# Patient Record
Sex: FEMALE | Race: WHITE | NOT HISPANIC OR LATINO | Employment: FULL TIME | URBAN - METROPOLITAN AREA
[De-identification: names, ages, dates, MRNs, and addresses within clinical notes are randomized per-mention and may not be internally consistent; named-entity substitution may affect disease eponyms.]

---

## 2020-05-17 ENCOUNTER — HOSPITAL ENCOUNTER (EMERGENCY)
Facility: HOSPITAL | Age: 30
Discharge: HOME | End: 2020-05-17
Attending: EMERGENCY MEDICINE
Payer: COMMERCIAL

## 2020-05-17 VITALS
RESPIRATION RATE: 20 BRPM | HEIGHT: 68 IN | SYSTOLIC BLOOD PRESSURE: 129 MMHG | HEART RATE: 84 BPM | OXYGEN SATURATION: 99 % | BODY MASS INDEX: 43.19 KG/M2 | DIASTOLIC BLOOD PRESSURE: 82 MMHG | TEMPERATURE: 99 F | WEIGHT: 285 LBS
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
## 2020-05-17 DIAGNOSIS — S93.409A SPRAIN OF ANKLE, UNSPECIFIED LATERALITY, UNSPECIFIED LIGAMENT, INITIAL ENCOUNTER: Primary | ICD-10-CM

## 2020-05-17 PROCEDURE — 99283 EMERGENCY DEPT VISIT LOW MDM: CPT | Mod: 25,ER

## 2020-05-17 RX ORDER — IBUPROFEN 600 MG/1
600 TABLET ORAL EVERY 6 HOURS PRN
Qty: 20 TABLET | Refills: 0 | Status: SHIPPED | OUTPATIENT
Start: 2020-05-17 | End: 2021-10-05 | Stop reason: ALTCHOICE

## 2020-12-31 ENCOUNTER — HOSPITAL ENCOUNTER (OUTPATIENT)
Dept: RADIOLOGY | Facility: HOSPITAL | Age: 30
Discharge: HOME | End: 2020-12-31
Attending: OBSTETRICS & GYNECOLOGY
Payer: COMMERCIAL

## 2020-12-31 DIAGNOSIS — Z31.41 FERTILITY TESTING: ICD-10-CM

## 2020-12-31 PROCEDURE — 76856 US EXAM PELVIC COMPLETE: CPT | Mod: 26,,, | Performed by: RADIOLOGY

## 2020-12-31 PROCEDURE — 76830 TRANSVAGINAL US NON-OB: CPT | Mod: 26,,, | Performed by: RADIOLOGY

## 2020-12-31 PROCEDURE — 76856 US EXAM PELVIC COMPLETE: CPT | Mod: TC

## 2020-12-31 PROCEDURE — 76856 US PELVIS COMP WITH TRANSVAG NON-OB (XPD): ICD-10-PCS | Mod: 26,,, | Performed by: RADIOLOGY

## 2020-12-31 PROCEDURE — 76830 US PELVIS COMP WITH TRANSVAG NON-OB (XPD): ICD-10-PCS | Mod: 26,,, | Performed by: RADIOLOGY

## 2021-01-07 ENCOUNTER — LAB VISIT (OUTPATIENT)
Dept: LAB | Facility: HOSPITAL | Age: 31
End: 2021-01-07
Attending: OBSTETRICS & GYNECOLOGY
Payer: COMMERCIAL

## 2021-01-07 DIAGNOSIS — Z31.41 FERTILITY TESTING: Primary | ICD-10-CM

## 2021-01-07 LAB
ABO + RH BLD: NORMAL
BASOPHILS # BLD AUTO: 0.05 K/UL (ref 0–0.2)
BASOPHILS NFR BLD: 0.4 % (ref 0–1.9)
BLD GP AB SCN CELLS X3 SERPL QL: NORMAL
DIFFERENTIAL METHOD: ABNORMAL
EOSINOPHIL # BLD AUTO: 0.2 K/UL (ref 0–0.5)
EOSINOPHIL NFR BLD: 1.2 % (ref 0–8)
ERYTHROCYTE [DISTWIDTH] IN BLOOD BY AUTOMATED COUNT: 14.6 % (ref 11.5–14.5)
HBV SURFACE AG SERPL QL IA: NEGATIVE
HCT VFR BLD AUTO: 39.4 % (ref 37–48.5)
HGB BLD-MCNC: 12.5 G/DL (ref 12–16)
HIV-1 AND HIV-2 ANTIBODIES: NEGATIVE
IMM GRANULOCYTES # BLD AUTO: 0.04 K/UL (ref 0–0.04)
IMM GRANULOCYTES NFR BLD AUTO: 0.3 % (ref 0–0.5)
LYMPHOCYTES # BLD AUTO: 4.1 K/UL (ref 1–4.8)
LYMPHOCYTES NFR BLD: 28.8 % (ref 18–48)
MCH RBC QN AUTO: 29.1 PG (ref 27–31)
MCHC RBC AUTO-ENTMCNC: 31.7 G/DL (ref 32–36)
MCV RBC AUTO: 92 FL (ref 82–98)
MONOCYTES # BLD AUTO: 0.8 K/UL (ref 0.3–1)
MONOCYTES NFR BLD: 5.7 % (ref 4–15)
NEUTROPHILS # BLD AUTO: 9 K/UL (ref 1.8–7.7)
NEUTROPHILS NFR BLD: 63.6 % (ref 38–73)
NRBC BLD-RTO: 0 /100 WBC
PLATELET # BLD AUTO: 287 K/UL (ref 150–350)
PMV BLD AUTO: 10.6 FL (ref 9.2–12.9)
RBC # BLD AUTO: 4.29 M/UL (ref 4–5.4)
RUBELLA IMMUNE STATUS: NORMAL
WBC # BLD AUTO: 14.13 K/UL (ref 3.9–12.7)

## 2021-01-07 PROCEDURE — 87340 HEPATITIS B SURFACE AG IA: CPT

## 2021-01-07 PROCEDURE — 36415 COLL VENOUS BLD VENIPUNCTURE: CPT

## 2021-01-07 PROCEDURE — 86850 RBC ANTIBODY SCREEN: CPT

## 2021-01-07 PROCEDURE — 83520 IMMUNOASSAY QUANT NOS NONAB: CPT

## 2021-01-07 PROCEDURE — 86787 VARICELLA-ZOSTER ANTIBODY: CPT

## 2021-01-07 PROCEDURE — 86705 HEP B CORE ANTIBODY IGM: CPT

## 2021-01-07 PROCEDURE — 87491 CHLMYD TRACH DNA AMP PROBE: CPT

## 2021-01-07 PROCEDURE — 86900 BLOOD TYPING SEROLOGIC ABO: CPT

## 2021-01-07 PROCEDURE — 84146 ASSAY OF PROLACTIN: CPT

## 2021-01-07 PROCEDURE — 86703 HIV-1/HIV-2 1 RESULT ANTBDY: CPT

## 2021-01-07 PROCEDURE — 82728 ASSAY OF FERRITIN: CPT

## 2021-01-07 PROCEDURE — 85025 COMPLETE CBC W/AUTO DIFF WBC: CPT

## 2021-01-07 PROCEDURE — 87591 N.GONORRHOEAE DNA AMP PROB: CPT

## 2021-01-07 PROCEDURE — 82306 VITAMIN D 25 HYDROXY: CPT

## 2021-01-07 PROCEDURE — 86762 RUBELLA ANTIBODY: CPT

## 2021-01-08 LAB
25(OH)D3+25(OH)D2 SERPL-MCNC: 34 NG/ML (ref 30–96)
C TRACH DNA SPEC QL NAA+PROBE: NOT DETECTED
HBV CORE IGM SERPL QL IA: NEGATIVE
HBV SURFACE AG SERPL QL IA: NEGATIVE
HIV 1+2 AB+HIV1 P24 AG SERPL QL IA: NEGATIVE
N GONORRHOEA DNA SPEC QL NAA+PROBE: NOT DETECTED
PROLACTIN SERPL IA-MCNC: 7.3 NG/ML (ref 5.2–26.5)
RUBV IGG SER-ACNC: 7.1 IU/ML
RUBV IGG SER-IMP: ABNORMAL

## 2021-01-09 LAB
VARICELLA INTERPRETATION: POSITIVE
VARICELLA ZOSTER IGG: 1.76 ISR (ref 0–0.9)

## 2021-01-11 LAB — MIS SERPL-MCNC: 1.4 NG/ML (ref 0.58–8.1)

## 2021-01-13 LAB
FERRITIN SERPL-MCNC: NORMAL NG/ML
HEMATOLOGIST REVIEW: NORMAL
HGB A MFR BLD ELPH: 97.7 % (ref 95.8–98)
HGB A2 MFR BLD: 2.3 % (ref 2–3.3)
HGB F MFR BLD: 0 % (ref 0–0.9)
HGB FRACT BLD ELPH-IMP: NORMAL
HGB OTHER MFR BLD ELPH: 0 %
THEVP VARIANT 2: NORMAL
THEVP VARIANT 3: NORMAL

## 2021-02-05 LAB
T4 FREE SERPL-MCNC: 0.81 NG/DL (ref 0.71–1.51)
TSH SERPL DL<=0.005 MIU/L-ACNC: 1.72 UIU/ML (ref 0.4–4)

## 2021-02-08 LAB
HCV AB SERPL QL IA: NEGATIVE
RPR SER QL: NORMAL

## 2021-06-09 ENCOUNTER — HOSPITAL ENCOUNTER (EMERGENCY)
Facility: HOSPITAL | Age: 31
Discharge: HOME | End: 2021-06-09
Attending: EMERGENCY MEDICINE
Payer: COMMERCIAL

## 2021-06-09 VITALS
HEART RATE: 67 BPM | OXYGEN SATURATION: 98 % | HEIGHT: 67 IN | RESPIRATION RATE: 20 BRPM | DIASTOLIC BLOOD PRESSURE: 73 MMHG | WEIGHT: 290 LBS | SYSTOLIC BLOOD PRESSURE: 157 MMHG | BODY MASS INDEX: 45.52 KG/M2 | TEMPERATURE: 98 F

## 2021-06-09 DIAGNOSIS — L05.91 PILONIDAL CYST: Primary | ICD-10-CM

## 2021-06-09 PROCEDURE — 99284 EMERGENCY DEPT VISIT MOD MDM: CPT | Mod: 25

## 2021-06-09 PROCEDURE — 25000003 PHARM REV CODE 250: Performed by: EMERGENCY MEDICINE

## 2021-06-09 PROCEDURE — 10081 I&D PILONIDAL CYST COMP: CPT

## 2021-06-09 RX ORDER — SULFAMETHOXAZOLE AND TRIMETHOPRIM 800; 160 MG/1; MG/1
2 TABLET ORAL 2 TIMES DAILY
Qty: 40 TABLET | Refills: 0 | Status: SHIPPED | OUTPATIENT
Start: 2021-06-09 | End: 2021-06-15

## 2021-06-09 RX ORDER — LIDOCAINE HYDROCHLORIDE AND EPINEPHRINE 10; 10 MG/ML; UG/ML
10 INJECTION, SOLUTION INFILTRATION; PERINEURAL
Status: COMPLETED | OUTPATIENT
Start: 2021-06-09 | End: 2021-06-09

## 2021-06-09 RX ORDER — HYDROCODONE BITARTRATE AND ACETAMINOPHEN 5; 325 MG/1; MG/1
1 TABLET ORAL EVERY 6 HOURS PRN
Qty: 12 TABLET | Refills: 0 | Status: SHIPPED | OUTPATIENT
Start: 2021-06-09 | End: 2021-10-05 | Stop reason: ALTCHOICE

## 2021-06-09 RX ADMIN — LIDOCAINE HYDROCHLORIDE,EPINEPHRINE BITARTRATE 10 ML: 10; .01 INJECTION, SOLUTION INFILTRATION; PERINEURAL at 08:06

## 2021-06-15 ENCOUNTER — OFFICE VISIT (OUTPATIENT)
Dept: SURGERY | Facility: CLINIC | Age: 31
End: 2021-06-15
Payer: COMMERCIAL

## 2021-06-15 VITALS
BODY MASS INDEX: 44.73 KG/M2 | TEMPERATURE: 97 F | SYSTOLIC BLOOD PRESSURE: 136 MMHG | WEIGHT: 285 LBS | DIASTOLIC BLOOD PRESSURE: 85 MMHG | HEART RATE: 88 BPM | HEIGHT: 67 IN

## 2021-06-15 DIAGNOSIS — L05.91 PILONIDAL CYST: ICD-10-CM

## 2021-06-15 PROCEDURE — 1126F PR PAIN SEVERITY QUANTIFIED, NO PAIN PRESENT: ICD-10-PCS | Mod: S$GLB,,, | Performed by: PHYSICIAN ASSISTANT

## 2021-06-15 PROCEDURE — 3008F BODY MASS INDEX DOCD: CPT | Mod: CPTII,S$GLB,, | Performed by: PHYSICIAN ASSISTANT

## 2021-06-15 PROCEDURE — 99203 OFFICE O/P NEW LOW 30 MIN: CPT | Mod: S$GLB,,, | Performed by: PHYSICIAN ASSISTANT

## 2021-06-15 PROCEDURE — 3008F PR BODY MASS INDEX (BMI) DOCUMENTED: ICD-10-PCS | Mod: CPTII,S$GLB,, | Performed by: PHYSICIAN ASSISTANT

## 2021-06-15 PROCEDURE — 1126F AMNT PAIN NOTED NONE PRSNT: CPT | Mod: S$GLB,,, | Performed by: PHYSICIAN ASSISTANT

## 2021-06-15 PROCEDURE — 99203 PR OFFICE/OUTPT VISIT, NEW, LEVL III, 30-44 MIN: ICD-10-PCS | Mod: S$GLB,,, | Performed by: PHYSICIAN ASSISTANT

## 2021-06-15 RX ORDER — ESCITALOPRAM OXALATE 10 MG/1
10 TABLET ORAL DAILY
COMMUNITY
End: 2021-10-05 | Stop reason: SDUPTHER

## 2021-09-27 ENCOUNTER — TELEPHONE (OUTPATIENT)
Dept: OBSTETRICS AND GYNECOLOGY | Facility: CLINIC | Age: 31
End: 2021-09-27

## 2021-09-27 DIAGNOSIS — Z34.90 PREGNANCY: Primary | ICD-10-CM

## 2021-10-05 ENCOUNTER — OFFICE VISIT (OUTPATIENT)
Dept: FAMILY MEDICINE | Facility: CLINIC | Age: 31
End: 2021-10-05
Payer: COMMERCIAL

## 2021-10-05 VITALS
BODY MASS INDEX: 45.99 KG/M2 | RESPIRATION RATE: 18 BRPM | TEMPERATURE: 99 F | WEIGHT: 293 LBS | DIASTOLIC BLOOD PRESSURE: 70 MMHG | HEART RATE: 88 BPM | SYSTOLIC BLOOD PRESSURE: 126 MMHG | OXYGEN SATURATION: 96 % | HEIGHT: 67 IN

## 2021-10-05 DIAGNOSIS — Z34.91 FIRST TRIMESTER PREGNANCY: ICD-10-CM

## 2021-10-05 DIAGNOSIS — Z00.00 ANNUAL PHYSICAL EXAM: Primary | ICD-10-CM

## 2021-10-05 DIAGNOSIS — J30.9 ALLERGIC RHINITIS, UNSPECIFIED SEASONALITY, UNSPECIFIED TRIGGER: ICD-10-CM

## 2021-10-05 DIAGNOSIS — E66.01 CLASS 3 SEVERE OBESITY DUE TO EXCESS CALORIES WITHOUT SERIOUS COMORBIDITY WITH BODY MASS INDEX (BMI) OF 45.0 TO 49.9 IN ADULT: ICD-10-CM

## 2021-10-05 DIAGNOSIS — F41.8 ANXIETY ASSOCIATED WITH DEPRESSION: ICD-10-CM

## 2021-10-05 PROBLEM — L30.9 FOOT DERMATITIS: Status: ACTIVE | Noted: 2019-06-04

## 2021-10-05 PROBLEM — J01.90 ACUTE NON-RECURRENT SINUSITIS: Status: ACTIVE | Noted: 2019-06-04

## 2021-10-05 PROCEDURE — 1159F MED LIST DOCD IN RCRD: CPT | Mod: CPTII,S$GLB,, | Performed by: NURSE PRACTITIONER

## 2021-10-05 PROCEDURE — 3078F DIAST BP <80 MM HG: CPT | Mod: CPTII,S$GLB,, | Performed by: NURSE PRACTITIONER

## 2021-10-05 PROCEDURE — 99385 PR PREVENTIVE VISIT,NEW,18-39: ICD-10-PCS | Mod: S$GLB,,, | Performed by: NURSE PRACTITIONER

## 2021-10-05 PROCEDURE — 99999 PR PBB SHADOW E&M-EST. PATIENT-LVL IV: CPT | Mod: PBBFAC,,, | Performed by: NURSE PRACTITIONER

## 2021-10-05 PROCEDURE — 3008F BODY MASS INDEX DOCD: CPT | Mod: CPTII,S$GLB,, | Performed by: NURSE PRACTITIONER

## 2021-10-05 PROCEDURE — 3008F PR BODY MASS INDEX (BMI) DOCUMENTED: ICD-10-PCS | Mod: CPTII,S$GLB,, | Performed by: NURSE PRACTITIONER

## 2021-10-05 PROCEDURE — 3078F PR MOST RECENT DIASTOLIC BLOOD PRESSURE < 80 MM HG: ICD-10-PCS | Mod: CPTII,S$GLB,, | Performed by: NURSE PRACTITIONER

## 2021-10-05 PROCEDURE — 1160F PR REVIEW ALL MEDS BY PRESCRIBER/CLIN PHARMACIST DOCUMENTED: ICD-10-PCS | Mod: CPTII,S$GLB,, | Performed by: NURSE PRACTITIONER

## 2021-10-05 PROCEDURE — 99999 PR PBB SHADOW E&M-EST. PATIENT-LVL IV: ICD-10-PCS | Mod: PBBFAC,,, | Performed by: NURSE PRACTITIONER

## 2021-10-05 PROCEDURE — 3074F SYST BP LT 130 MM HG: CPT | Mod: CPTII,S$GLB,, | Performed by: NURSE PRACTITIONER

## 2021-10-05 PROCEDURE — 1160F RVW MEDS BY RX/DR IN RCRD: CPT | Mod: CPTII,S$GLB,, | Performed by: NURSE PRACTITIONER

## 2021-10-05 PROCEDURE — 3074F PR MOST RECENT SYSTOLIC BLOOD PRESSURE < 130 MM HG: ICD-10-PCS | Mod: CPTII,S$GLB,, | Performed by: NURSE PRACTITIONER

## 2021-10-05 PROCEDURE — 1159F PR MEDICATION LIST DOCUMENTED IN MEDICAL RECORD: ICD-10-PCS | Mod: CPTII,S$GLB,, | Performed by: NURSE PRACTITIONER

## 2021-10-05 PROCEDURE — 99385 PREV VISIT NEW AGE 18-39: CPT | Mod: S$GLB,,, | Performed by: NURSE PRACTITIONER

## 2021-10-05 RX ORDER — IPRATROPIUM BROMIDE 21 UG/1
2 SPRAY, METERED NASAL DAILY PRN
Qty: 30 ML | Refills: 2 | Status: SHIPPED | OUTPATIENT
Start: 2021-10-05 | End: 2021-10-07

## 2021-10-05 RX ORDER — CETIRIZINE HYDROCHLORIDE 10 MG/1
10 TABLET ORAL DAILY
Qty: 90 TABLET | Refills: 3 | Status: SHIPPED | OUTPATIENT
Start: 2021-10-05 | End: 2021-10-07

## 2021-10-05 RX ORDER — ESCITALOPRAM OXALATE 10 MG/1
10 TABLET ORAL DAILY
Qty: 90 TABLET | Refills: 3 | Status: SHIPPED | OUTPATIENT
Start: 2021-10-05 | End: 2021-12-01 | Stop reason: ALTCHOICE

## 2021-10-06 ENCOUNTER — PATIENT MESSAGE (OUTPATIENT)
Dept: OBSTETRICS AND GYNECOLOGY | Facility: CLINIC | Age: 31
End: 2021-10-06

## 2021-10-07 ENCOUNTER — TELEPHONE (OUTPATIENT)
Dept: OBSTETRICS AND GYNECOLOGY | Facility: CLINIC | Age: 31
End: 2021-10-07

## 2021-10-07 ENCOUNTER — PROCEDURE VISIT (OUTPATIENT)
Dept: OBSTETRICS AND GYNECOLOGY | Facility: CLINIC | Age: 31
End: 2021-10-07
Payer: COMMERCIAL

## 2021-10-07 ENCOUNTER — INITIAL PRENATAL (OUTPATIENT)
Dept: OBSTETRICS AND GYNECOLOGY | Facility: CLINIC | Age: 31
End: 2021-10-07
Payer: COMMERCIAL

## 2021-10-07 VITALS — DIASTOLIC BLOOD PRESSURE: 80 MMHG | BODY MASS INDEX: 46.61 KG/M2 | WEIGHT: 293 LBS | SYSTOLIC BLOOD PRESSURE: 124 MMHG

## 2021-10-07 DIAGNOSIS — Z34.90 PREGNANCY: ICD-10-CM

## 2021-10-07 DIAGNOSIS — Z34.90 PREGNANCY, UNSPECIFIED GESTATIONAL AGE: Primary | ICD-10-CM

## 2021-10-07 DIAGNOSIS — Z31.438 OTHER GENETIC TESTING OF FEMALE: ICD-10-CM

## 2021-10-07 PROCEDURE — 76801 OB US < 14 WKS SINGLE FETUS: CPT | Mod: PBBFAC | Performed by: OBSTETRICS & GYNECOLOGY

## 2021-10-07 PROCEDURE — 0500F PR INITIAL PRENATAL CARE VISIT: ICD-10-PCS | Mod: CPTII,S$GLB,, | Performed by: PHYSICIAN ASSISTANT

## 2021-10-07 PROCEDURE — 0500F INITIAL PRENATAL CARE VISIT: CPT | Mod: CPTII,S$GLB,, | Performed by: PHYSICIAN ASSISTANT

## 2021-10-07 PROCEDURE — 99999 PR PBB SHADOW E&M-EST. PATIENT-LVL II: ICD-10-PCS | Mod: PBBFAC,,, | Performed by: PHYSICIAN ASSISTANT

## 2021-10-07 PROCEDURE — 87591 N.GONORRHOEAE DNA AMP PROB: CPT | Performed by: PHYSICIAN ASSISTANT

## 2021-10-07 PROCEDURE — 99999 PR PBB SHADOW E&M-EST. PATIENT-LVL II: CPT | Mod: PBBFAC,,, | Performed by: PHYSICIAN ASSISTANT

## 2021-10-07 PROCEDURE — 87086 URINE CULTURE/COLONY COUNT: CPT | Performed by: PHYSICIAN ASSISTANT

## 2021-10-07 PROCEDURE — 87491 CHLMYD TRACH DNA AMP PROBE: CPT | Performed by: PHYSICIAN ASSISTANT

## 2021-10-08 ENCOUNTER — TELEPHONE (OUTPATIENT)
Dept: OBSTETRICS AND GYNECOLOGY | Facility: CLINIC | Age: 31
End: 2021-10-08

## 2021-10-08 ENCOUNTER — PATIENT MESSAGE (OUTPATIENT)
Dept: OBSTETRICS AND GYNECOLOGY | Facility: CLINIC | Age: 31
End: 2021-10-08

## 2021-10-08 LAB
ALBUMIN SERPL-MCNC: 4.1 G/DL (ref 3.8–4.8)
ALBUMIN/GLOB SERPL: 1.4 {RATIO} (ref 1.2–2.2)
ALP SERPL-CCNC: 79 IU/L (ref 44–121)
ALT SERPL-CCNC: 66 IU/L (ref 0–32)
AST SERPL-CCNC: 65 IU/L (ref 0–40)
BASOPHILS # BLD AUTO: 0 X10E3/UL (ref 0–0.2)
BASOPHILS NFR BLD AUTO: 1 %
BILIRUB SERPL-MCNC: 0.5 MG/DL (ref 0–1.2)
BUN SERPL-MCNC: 7 MG/DL (ref 6–20)
BUN/CREAT SERPL: 9 (ref 9–23)
C TRACH DNA SPEC QL NAA+PROBE: NOT DETECTED
CALCIUM SERPL-MCNC: 8.6 MG/DL (ref 8.7–10.2)
CHLORIDE SERPL-SCNC: 105 MMOL/L (ref 96–106)
CHOLEST SERPL-MCNC: 177 MG/DL (ref 100–199)
CO2 SERPL-SCNC: 19 MMOL/L (ref 20–29)
CREAT SERPL-MCNC: 0.82 MG/DL (ref 0.57–1)
EOSINOPHIL # BLD AUTO: 0.2 X10E3/UL (ref 0–0.4)
EOSINOPHIL NFR BLD AUTO: 2 %
ERYTHROCYTE [DISTWIDTH] IN BLOOD BY AUTOMATED COUNT: 14.3 % (ref 11.7–15.4)
GLOBULIN SER CALC-MCNC: 2.9 G/DL (ref 1.5–4.5)
GLUCOSE SERPL-MCNC: 164 MG/DL (ref 65–99)
HBA1C MFR BLD: 5.8 % (ref 4.8–5.6)
HCT VFR BLD AUTO: 39.9 % (ref 34–46.6)
HDLC SERPL-MCNC: 38 MG/DL
HGB BLD-MCNC: 12.8 G/DL (ref 11.1–15.9)
IMM GRANULOCYTES # BLD AUTO: 0 X10E3/UL (ref 0–0.1)
IMM GRANULOCYTES NFR BLD AUTO: 0 %
LDLC SERPL CALC-MCNC: 112 MG/DL (ref 0–99)
LYMPHOCYTES # BLD AUTO: 2 X10E3/UL (ref 0.7–3.1)
LYMPHOCYTES NFR BLD AUTO: 24 %
MCH RBC QN AUTO: 28.8 PG (ref 26.6–33)
MCHC RBC AUTO-ENTMCNC: 32.1 G/DL (ref 31.5–35.7)
MCV RBC AUTO: 90 FL (ref 79–97)
MONOCYTES # BLD AUTO: 0.6 X10E3/UL (ref 0.1–0.9)
MONOCYTES NFR BLD AUTO: 8 %
N GONORRHOEA DNA SPEC QL NAA+PROBE: NOT DETECTED
NEUTROPHILS # BLD AUTO: 5.5 X10E3/UL (ref 1.4–7)
NEUTROPHILS NFR BLD AUTO: 65 %
PLATELET # BLD AUTO: 243 X10E3/UL (ref 150–450)
POTASSIUM SERPL-SCNC: 4.2 MMOL/L (ref 3.5–5.2)
PROT SERPL-MCNC: 7 G/DL (ref 6–8.5)
RBC # BLD AUTO: 4.45 X10E6/UL (ref 3.77–5.28)
SODIUM SERPL-SCNC: 138 MMOL/L (ref 134–144)
TRIGL SERPL-MCNC: 153 MG/DL (ref 0–149)
VLDLC SERPL CALC-MCNC: 27 MG/DL (ref 5–40)
WBC # BLD AUTO: 8.3 X10E3/UL (ref 3.4–10.8)

## 2021-10-09 LAB
BACTERIA UR CULT: NORMAL
BACTERIA UR CULT: NORMAL

## 2021-10-13 ENCOUNTER — TELEPHONE (OUTPATIENT)
Dept: FAMILY MEDICINE | Facility: CLINIC | Age: 31
End: 2021-10-13

## 2021-10-19 ENCOUNTER — PATIENT MESSAGE (OUTPATIENT)
Dept: SURGERY | Facility: CLINIC | Age: 31
End: 2021-10-19
Payer: COMMERCIAL

## 2021-11-04 ENCOUNTER — INITIAL PRENATAL (OUTPATIENT)
Dept: OBSTETRICS AND GYNECOLOGY | Facility: CLINIC | Age: 31
End: 2021-11-04
Payer: COMMERCIAL

## 2021-11-04 ENCOUNTER — TELEPHONE (OUTPATIENT)
Dept: OBSTETRICS AND GYNECOLOGY | Facility: CLINIC | Age: 31
End: 2021-11-04

## 2021-11-04 ENCOUNTER — PATIENT MESSAGE (OUTPATIENT)
Dept: OBSTETRICS AND GYNECOLOGY | Facility: CLINIC | Age: 31
End: 2021-11-04

## 2021-11-04 ENCOUNTER — DOCUMENTATION ONLY (OUTPATIENT)
Dept: OBSTETRICS AND GYNECOLOGY | Facility: CLINIC | Age: 31
End: 2021-11-04

## 2021-11-04 VITALS — DIASTOLIC BLOOD PRESSURE: 100 MMHG | WEIGHT: 293 LBS | BODY MASS INDEX: 47.94 KG/M2 | SYSTOLIC BLOOD PRESSURE: 133 MMHG

## 2021-11-04 DIAGNOSIS — Z34.90 PREGNANCY, UNSPECIFIED GESTATIONAL AGE: Primary | ICD-10-CM

## 2021-11-04 PROCEDURE — 0502F PR SUBSEQUENT PRENATAL CARE: ICD-10-PCS | Mod: S$GLB,,, | Performed by: MIDWIFE

## 2021-11-04 PROCEDURE — 99999 PR PBB SHADOW E&M-EST. PATIENT-LVL II: ICD-10-PCS | Mod: PBBFAC,,, | Performed by: MIDWIFE

## 2021-11-04 PROCEDURE — 99999 PR PBB SHADOW E&M-EST. PATIENT-LVL II: CPT | Mod: PBBFAC,,, | Performed by: MIDWIFE

## 2021-11-04 PROCEDURE — 0502F SUBSEQUENT PRENATAL CARE: CPT | Mod: S$GLB,,, | Performed by: MIDWIFE

## 2021-11-05 ENCOUNTER — PATIENT MESSAGE (OUTPATIENT)
Dept: OBSTETRICS AND GYNECOLOGY | Facility: CLINIC | Age: 31
End: 2021-11-05
Payer: COMMERCIAL

## 2021-11-05 ENCOUNTER — TELEPHONE (OUTPATIENT)
Dept: OBSTETRICS AND GYNECOLOGY | Facility: CLINIC | Age: 31
End: 2021-11-05
Payer: COMMERCIAL

## 2021-11-17 ENCOUNTER — PATIENT MESSAGE (OUTPATIENT)
Dept: ADMINISTRATIVE | Facility: OTHER | Age: 31
End: 2021-11-17
Payer: COMMERCIAL

## 2021-11-18 ENCOUNTER — PATIENT MESSAGE (OUTPATIENT)
Dept: MATERNAL FETAL MEDICINE | Facility: CLINIC | Age: 31
End: 2021-11-18
Payer: COMMERCIAL

## 2021-11-19 ENCOUNTER — PROCEDURE VISIT (OUTPATIENT)
Dept: MATERNAL FETAL MEDICINE | Facility: CLINIC | Age: 31
End: 2021-11-19
Payer: COMMERCIAL

## 2021-11-19 ENCOUNTER — LAB VISIT (OUTPATIENT)
Dept: LAB | Facility: OTHER | Age: 31
End: 2021-11-19
Attending: STUDENT IN AN ORGANIZED HEALTH CARE EDUCATION/TRAINING PROGRAM
Payer: COMMERCIAL

## 2021-11-19 DIAGNOSIS — Z34.90 PREGNANCY, UNSPECIFIED GESTATIONAL AGE: ICD-10-CM

## 2021-11-19 DIAGNOSIS — Z31.438 OTHER GENETIC TESTING OF FEMALE: ICD-10-CM

## 2021-11-19 DIAGNOSIS — Z36.82 ENCOUNTER FOR ANTENATAL SCREENING FOR NUCHAL TRANSLUCENCY: ICD-10-CM

## 2021-11-19 DIAGNOSIS — O99.212 OBESITY AFFECTING PREGNANCY IN SECOND TRIMESTER: ICD-10-CM

## 2021-11-19 DIAGNOSIS — Z36.89 ENCOUNTER FOR FETAL ANATOMIC SURVEY: Primary | ICD-10-CM

## 2021-11-19 DIAGNOSIS — Z36.82 ENCOUNTER FOR ANTENATAL SCREENING FOR NUCHAL TRANSLUCENCY: Primary | ICD-10-CM

## 2021-11-19 LAB
ABO + RH BLD: NORMAL
ALBUMIN SERPL BCP-MCNC: 3.6 G/DL (ref 3.5–5.2)
ALP SERPL-CCNC: 66 U/L (ref 55–135)
ALT SERPL W/O P-5'-P-CCNC: 57 U/L (ref 10–44)
ANION GAP SERPL CALC-SCNC: 10 MMOL/L (ref 8–16)
AST SERPL-CCNC: 57 U/L (ref 10–40)
BASOPHILS # BLD AUTO: 0.04 K/UL (ref 0–0.2)
BASOPHILS NFR BLD: 0.4 % (ref 0–1.9)
BILIRUB SERPL-MCNC: 0.6 MG/DL (ref 0.1–1)
BLD GP AB SCN CELLS X3 SERPL QL: NORMAL
BUN SERPL-MCNC: 8 MG/DL (ref 6–20)
CALCIUM SERPL-MCNC: 9.1 MG/DL (ref 8.7–10.5)
CHLORIDE SERPL-SCNC: 107 MMOL/L (ref 95–110)
CO2 SERPL-SCNC: 19 MMOL/L (ref 23–29)
CREAT SERPL-MCNC: 0.7 MG/DL (ref 0.5–1.4)
DIFFERENTIAL METHOD: ABNORMAL
EOSINOPHIL # BLD AUTO: 0.1 K/UL (ref 0–0.5)
EOSINOPHIL NFR BLD: 1 % (ref 0–8)
ERYTHROCYTE [DISTWIDTH] IN BLOOD BY AUTOMATED COUNT: 14.8 % (ref 11.5–14.5)
EST. GFR  (AFRICAN AMERICAN): >60 ML/MIN/1.73 M^2
EST. GFR  (NON AFRICAN AMERICAN): >60 ML/MIN/1.73 M^2
GLUCOSE SERPL-MCNC: 107 MG/DL (ref 70–110)
HCT VFR BLD AUTO: 38.1 % (ref 37–48.5)
HGB BLD-MCNC: 12.6 G/DL (ref 12–16)
IMM GRANULOCYTES # BLD AUTO: 0.02 K/UL (ref 0–0.04)
IMM GRANULOCYTES NFR BLD AUTO: 0.2 % (ref 0–0.5)
LYMPHOCYTES # BLD AUTO: 2.5 K/UL (ref 1–4.8)
LYMPHOCYTES NFR BLD: 28.1 % (ref 18–48)
MCH RBC QN AUTO: 29.7 PG (ref 27–31)
MCHC RBC AUTO-ENTMCNC: 33.1 G/DL (ref 32–36)
MCV RBC AUTO: 90 FL (ref 82–98)
MONOCYTES # BLD AUTO: 0.6 K/UL (ref 0.3–1)
MONOCYTES NFR BLD: 6.8 % (ref 4–15)
NEUTROPHILS # BLD AUTO: 5.7 K/UL (ref 1.8–7.7)
NEUTROPHILS NFR BLD: 63.5 % (ref 38–73)
NRBC BLD-RTO: 0 /100 WBC
PLATELET # BLD AUTO: 235 K/UL (ref 150–450)
PMV BLD AUTO: 11 FL (ref 9.2–12.9)
POTASSIUM SERPL-SCNC: 4.1 MMOL/L (ref 3.5–5.1)
PROT SERPL-MCNC: 7 G/DL (ref 6–8.4)
RBC # BLD AUTO: 4.24 M/UL (ref 4–5.4)
RPR SER QL: NORMAL
SODIUM SERPL-SCNC: 136 MMOL/L (ref 136–145)
WBC # BLD AUTO: 9.03 K/UL (ref 3.9–12.7)

## 2021-11-19 PROCEDURE — 76813 OB US NUCHAL MEAS 1 GEST: CPT | Mod: S$GLB,,, | Performed by: OBSTETRICS & GYNECOLOGY

## 2021-11-19 PROCEDURE — 85025 COMPLETE CBC W/AUTO DIFF WBC: CPT | Performed by: MIDWIFE

## 2021-11-19 PROCEDURE — 36415 COLL VENOUS BLD VENIPUNCTURE: CPT | Performed by: MIDWIFE

## 2021-11-19 PROCEDURE — 87389 HIV-1 AG W/HIV-1&-2 AB AG IA: CPT | Performed by: MIDWIFE

## 2021-11-19 PROCEDURE — 86592 SYPHILIS TEST NON-TREP QUAL: CPT | Performed by: MIDWIFE

## 2021-11-19 PROCEDURE — 86762 RUBELLA ANTIBODY: CPT | Performed by: MIDWIFE

## 2021-11-19 PROCEDURE — 87340 HEPATITIS B SURFACE AG IA: CPT | Performed by: MIDWIFE

## 2021-11-19 PROCEDURE — 76813 US MFM PROCEDURE (VIEWPOINT): ICD-10-PCS | Mod: S$GLB,,, | Performed by: OBSTETRICS & GYNECOLOGY

## 2021-11-19 PROCEDURE — 81508 FTL CGEN ABNOR TWO PROTEINS: CPT | Performed by: STUDENT IN AN ORGANIZED HEALTH CARE EDUCATION/TRAINING PROGRAM

## 2021-11-19 PROCEDURE — 86900 BLOOD TYPING SEROLOGIC ABO: CPT | Performed by: MIDWIFE

## 2021-11-19 PROCEDURE — 80053 COMPREHEN METABOLIC PANEL: CPT | Performed by: MIDWIFE

## 2021-11-22 DIAGNOSIS — O99.210 OBESITY AFFECTING PREGNANCY, ANTEPARTUM: Primary | ICD-10-CM

## 2021-11-22 LAB
# FETUSES US: NORMAL
AGE AT DELIVERY: 33
B-HCG MOM SERPL: NORMAL
B-HCG SERPL-ACNC: 66.9 IU/ML
FET CRL US.MEAS: 60.5 MM
FET NASAL BONE LENGTH US.MEAS: NORMAL MM
FET NUCHAL FOLD MOM THICKNESS US.MEAS: NORMAL
FET NUCHAL FOLD THICKNESS US.MEAS: 1.6 MM
FET TS 21 RISK FROM MAT AGE: NORMAL
GA (DAYS): 4 D
GA (WEEKS): 12 WK
HBV SURFACE AG SERPL QL IA: NEGATIVE
HIV 1+2 AB+HIV1 P24 AG SERPL QL IA: NEGATIVE
IDDM PATIENT QL: NORMAL
INTEGRATED SCN PATIENT-IMP NAR: NORMAL
INTEGRATED SCN PATIENT-IMP: NEGATIVE
PAPP-A MOM SERPL: NORMAL
PAPP-A SERPL-MCNC: 348.9 NG/ML
RUBV IGG SER-ACNC: 8.2 IU/ML
RUBV IGG SER-IMP: ABNORMAL
SMOKING STATUS FTND: NO
TS 18 RISK FETUS: NORMAL
TS 21 RISK FETUS: NORMAL
US DATE: NORMAL

## 2021-11-24 ENCOUNTER — PATIENT MESSAGE (OUTPATIENT)
Dept: ADMINISTRATIVE | Facility: OTHER | Age: 31
End: 2021-11-24
Payer: COMMERCIAL

## 2021-12-01 ENCOUNTER — PATIENT MESSAGE (OUTPATIENT)
Dept: ADMINISTRATIVE | Facility: OTHER | Age: 31
End: 2021-12-01
Payer: COMMERCIAL

## 2021-12-01 ENCOUNTER — PATIENT MESSAGE (OUTPATIENT)
Dept: OBSTETRICS AND GYNECOLOGY | Facility: CLINIC | Age: 31
End: 2021-12-01

## 2021-12-01 ENCOUNTER — ROUTINE PRENATAL (OUTPATIENT)
Dept: OBSTETRICS AND GYNECOLOGY | Facility: CLINIC | Age: 31
End: 2021-12-01
Payer: COMMERCIAL

## 2021-12-01 VITALS — DIASTOLIC BLOOD PRESSURE: 86 MMHG | SYSTOLIC BLOOD PRESSURE: 132 MMHG | WEIGHT: 293 LBS | BODY MASS INDEX: 47.2 KG/M2

## 2021-12-01 DIAGNOSIS — O99.340 DEPRESSION AFFECTING PREGNANCY: ICD-10-CM

## 2021-12-01 DIAGNOSIS — F32.A DEPRESSION AFFECTING PREGNANCY: ICD-10-CM

## 2021-12-01 DIAGNOSIS — O09.899 RUBELLA NON-IMMUNE STATUS, ANTEPARTUM: ICD-10-CM

## 2021-12-01 DIAGNOSIS — O99.212 OBESITY AFFECTING PREGNANCY IN SECOND TRIMESTER: Primary | ICD-10-CM

## 2021-12-01 DIAGNOSIS — R74.8 ELEVATED LIVER ENZYMES: ICD-10-CM

## 2021-12-01 DIAGNOSIS — Z28.39 RUBELLA NON-IMMUNE STATUS, ANTEPARTUM: ICD-10-CM

## 2021-12-01 PROCEDURE — 99999 PR PBB SHADOW E&M-EST. PATIENT-LVL III: CPT | Mod: PBBFAC,,, | Performed by: STUDENT IN AN ORGANIZED HEALTH CARE EDUCATION/TRAINING PROGRAM

## 2021-12-01 PROCEDURE — 0502F SUBSEQUENT PRENATAL CARE: CPT | Mod: CPTII,S$GLB,, | Performed by: STUDENT IN AN ORGANIZED HEALTH CARE EDUCATION/TRAINING PROGRAM

## 2021-12-01 PROCEDURE — 99999 PR PBB SHADOW E&M-EST. PATIENT-LVL III: ICD-10-PCS | Mod: PBBFAC,,, | Performed by: STUDENT IN AN ORGANIZED HEALTH CARE EDUCATION/TRAINING PROGRAM

## 2021-12-01 PROCEDURE — 0502F PR SUBSEQUENT PRENATAL CARE: ICD-10-PCS | Mod: CPTII,S$GLB,, | Performed by: STUDENT IN AN ORGANIZED HEALTH CARE EDUCATION/TRAINING PROGRAM

## 2021-12-01 RX ORDER — ESCITALOPRAM OXALATE 20 MG/1
20 TABLET ORAL DAILY
Qty: 30 TABLET | Refills: 2 | Status: SHIPPED | OUTPATIENT
Start: 2021-12-01 | End: 2022-02-23 | Stop reason: SDUPTHER

## 2021-12-09 ENCOUNTER — OFFICE VISIT (OUTPATIENT)
Dept: PSYCHIATRY | Facility: CLINIC | Age: 31
End: 2021-12-09
Payer: COMMERCIAL

## 2021-12-09 ENCOUNTER — PATIENT MESSAGE (OUTPATIENT)
Dept: PSYCHIATRY | Facility: CLINIC | Age: 31
End: 2021-12-09
Payer: COMMERCIAL

## 2021-12-09 DIAGNOSIS — O99.340 DEPRESSION AFFECTING PREGNANCY: ICD-10-CM

## 2021-12-09 DIAGNOSIS — F32.A DEPRESSION AFFECTING PREGNANCY: ICD-10-CM

## 2021-12-09 DIAGNOSIS — F41.1 GAD (GENERALIZED ANXIETY DISORDER): Primary | ICD-10-CM

## 2021-12-09 PROCEDURE — 90791 PR PSYCHIATRIC DIAGNOSTIC EVALUATION: ICD-10-PCS | Mod: 95,,, | Performed by: PSYCHOLOGIST

## 2021-12-09 PROCEDURE — 90791 PSYCH DIAGNOSTIC EVALUATION: CPT | Mod: 95,,, | Performed by: PSYCHOLOGIST

## 2021-12-17 ENCOUNTER — PATIENT MESSAGE (OUTPATIENT)
Dept: OBSTETRICS AND GYNECOLOGY | Facility: CLINIC | Age: 31
End: 2021-12-17
Payer: COMMERCIAL

## 2021-12-30 ENCOUNTER — OFFICE VISIT (OUTPATIENT)
Dept: PSYCHIATRY | Facility: CLINIC | Age: 31
End: 2021-12-30
Payer: COMMERCIAL

## 2021-12-30 DIAGNOSIS — O99.340 DEPRESSION AFFECTING PREGNANCY: ICD-10-CM

## 2021-12-30 DIAGNOSIS — F41.1 GAD (GENERALIZED ANXIETY DISORDER): Primary | ICD-10-CM

## 2021-12-30 DIAGNOSIS — F32.A DEPRESSION AFFECTING PREGNANCY: ICD-10-CM

## 2021-12-30 PROCEDURE — 90834 PR PSYCHOTHERAPY W/PATIENT, 45 MIN: ICD-10-PCS | Mod: 95,,, | Performed by: PSYCHOLOGIST

## 2021-12-30 PROCEDURE — 90834 PSYTX W PT 45 MINUTES: CPT | Mod: 95,,, | Performed by: PSYCHOLOGIST

## 2022-01-07 NOTE — PROGRESS NOTES
Individual Psychotherapy (PhD/LCSW)    12/30/2021    The patient location is: Patient's home in [place], [place].  The chief complaint leading to consultation is: anxiety, depression  Visit type: audiovisual  Face to Face time with patient: 45 minutes of total time spent on the encounter, which includes face to face time and non-face to face time preparing to see the patient (eg, review of tests), Obtaining and/or reviewing separately obtained history, Documenting clinical information in the electronic or other health record, Independently interpreting results (not separately reported) and communicating results to the patient/family/caregiver, or Care coordination (not separately reported).   Each patient to whom he or she provides medical services by telemedicine is:  (1) informed of the relationship between the physician and patient and the respective role of any other health care provider with respect to management of the patient; and (2) notified that he or she may decline to receive medical services by telemedicine and may withdraw from such care at any time.       Therapeutic Intervention: Met with patient.  Outpatient - Insight oriented psychotherapy 45 min - CPT code 42049 and Outpatient - Behavior modifying psychotherapy 45 min - CPT code 95896    Chief complaint/reason for encounter: depression and anxiety     Interval history and content of current session: Patient was timely for her first individual therapy session after intake. She reported feeling less anxious and depressed over the past two weeks, which she attributed to the medication working and beginning to address her issues in therapy. She completed the 3-column thought record for homework which was reviewed in session. She noted her irritability has been more of an issue which was discussed in more depth and discussed how to speak to her  in a more assertive way than aggressive. She indicated having old habits from previous relationships that  are difficult to break but also knows she needs to because her current  is very different from her first  and is very kind to her.    Treatment plan:  · Target symptoms: depression, anxiety   · Why chosen therapy is appropriate versus another modality: relevant to diagnosis, evidence based practice  · Outcome monitoring methods: self-report, observation  · Therapeutic intervention type: insight oriented psychotherapy, behavior modifying psychotherapy    Risk parameters:  Patient reports no suicidal ideation  Patient reports no homicidal ideation  Patient reports no self-injurious behavior  Patient reports no violent behavior    Verbal deficits: None    Patient's response to intervention:  The patient's response to intervention is accepting.    Progress toward goals and other mental status changes:  The patient's progress toward goals is fair .    Mental Status Exam:  General Appearance:  unremarkable, age appropriate   Speech: normal tone, normal rate, normal pitch, normal volume      Level of Cooperation: cooperative      Thought Processes: normal and logical   Mood: steady      Thought Content: normal, no suicidality, no homicidality, delusions, or paranoia   Affect: congruent and appropriate   Orientation: Oriented x3   Attention Span & Concentration: intact   Judgment & Insight: fair     Language  intact     Diagnosis:     ICD-10-CM ICD-9-CM   1. CANDACE (generalized anxiety disorder)  F41.1 300.02   2. Depression affecting pregnancy  O99.340 648.40    F32.A 311     Plan:  individual psychotherapy and medication management by physician    Return to clinic: 2 weeks    Length of Service (minutes): 45

## 2022-01-10 PROBLEM — J01.90 ACUTE NON-RECURRENT SINUSITIS: Status: RESOLVED | Noted: 2019-06-04 | Resolved: 2022-01-10

## 2022-01-11 ENCOUNTER — PATIENT MESSAGE (OUTPATIENT)
Dept: MATERNAL FETAL MEDICINE | Facility: CLINIC | Age: 32
End: 2022-01-11
Payer: COMMERCIAL

## 2022-01-12 ENCOUNTER — PROCEDURE VISIT (OUTPATIENT)
Dept: MATERNAL FETAL MEDICINE | Facility: CLINIC | Age: 32
End: 2022-01-12
Payer: COMMERCIAL

## 2022-01-12 ENCOUNTER — RESEARCH ENCOUNTER (OUTPATIENT)
Dept: RESEARCH | Facility: OTHER | Age: 32
End: 2022-01-12
Payer: COMMERCIAL

## 2022-01-12 ENCOUNTER — LAB VISIT (OUTPATIENT)
Dept: LAB | Facility: OTHER | Age: 32
End: 2022-01-12
Attending: STUDENT IN AN ORGANIZED HEALTH CARE EDUCATION/TRAINING PROGRAM
Payer: COMMERCIAL

## 2022-01-12 DIAGNOSIS — R74.8 ELEVATED LIVER ENZYMES: ICD-10-CM

## 2022-01-12 DIAGNOSIS — O09.899 RUBELLA NON-IMMUNE STATUS, ANTEPARTUM: ICD-10-CM

## 2022-01-12 DIAGNOSIS — Z28.39 RUBELLA NON-IMMUNE STATUS, ANTEPARTUM: ICD-10-CM

## 2022-01-12 DIAGNOSIS — Z36.89 ENCOUNTER FOR FETAL ANATOMIC SURVEY: ICD-10-CM

## 2022-01-12 DIAGNOSIS — O99.212 OBESITY AFFECTING PREGNANCY IN SECOND TRIMESTER: ICD-10-CM

## 2022-01-12 DIAGNOSIS — Z36.2 ENCOUNTER FOR FOLLOW-UP ULTRASOUND OF FETAL ANATOMY: Primary | ICD-10-CM

## 2022-01-12 LAB
BASOPHILS # BLD AUTO: 0.02 K/UL (ref 0–0.2)
BASOPHILS NFR BLD: 0.2 % (ref 0–1.9)
DIFFERENTIAL METHOD: ABNORMAL
EOSINOPHIL # BLD AUTO: 0.1 K/UL (ref 0–0.5)
EOSINOPHIL NFR BLD: 0.8 % (ref 0–8)
ERYTHROCYTE [DISTWIDTH] IN BLOOD BY AUTOMATED COUNT: 15.2 % (ref 11.5–14.5)
HCT VFR BLD AUTO: 33.7 % (ref 37–48.5)
HGB BLD-MCNC: 11.3 G/DL (ref 12–16)
IMM GRANULOCYTES # BLD AUTO: 0.05 K/UL (ref 0–0.04)
IMM GRANULOCYTES NFR BLD AUTO: 0.5 % (ref 0–0.5)
LYMPHOCYTES # BLD AUTO: 2.6 K/UL (ref 1–4.8)
LYMPHOCYTES NFR BLD: 23.3 % (ref 18–48)
MCH RBC QN AUTO: 30.7 PG (ref 27–31)
MCHC RBC AUTO-ENTMCNC: 33.5 G/DL (ref 32–36)
MCV RBC AUTO: 92 FL (ref 82–98)
MONOCYTES # BLD AUTO: 0.7 K/UL (ref 0.3–1)
MONOCYTES NFR BLD: 6.6 % (ref 4–15)
NEUTROPHILS # BLD AUTO: 7.6 K/UL (ref 1.8–7.7)
NEUTROPHILS NFR BLD: 68.6 % (ref 38–73)
NRBC BLD-RTO: 0 /100 WBC
PLATELET # BLD AUTO: 270 K/UL (ref 150–450)
PMV BLD AUTO: 10.9 FL (ref 9.2–12.9)
RBC # BLD AUTO: 3.68 M/UL (ref 4–5.4)
WBC # BLD AUTO: 11.05 K/UL (ref 3.9–12.7)

## 2022-01-12 PROCEDURE — 85025 COMPLETE CBC W/AUTO DIFF WBC: CPT | Performed by: STUDENT IN AN ORGANIZED HEALTH CARE EDUCATION/TRAINING PROGRAM

## 2022-01-12 PROCEDURE — 76811 OB US DETAILED SNGL FETUS: CPT | Mod: S$GLB,,, | Performed by: OBSTETRICS & GYNECOLOGY

## 2022-01-12 PROCEDURE — 76811 US MFM PROCEDURE (VIEWPOINT): ICD-10-PCS | Mod: S$GLB,,, | Performed by: OBSTETRICS & GYNECOLOGY

## 2022-01-12 PROCEDURE — 86592 SYPHILIS TEST NON-TREP QUAL: CPT | Performed by: STUDENT IN AN ORGANIZED HEALTH CARE EDUCATION/TRAINING PROGRAM

## 2022-01-12 PROCEDURE — 80074 ACUTE HEPATITIS PANEL: CPT | Performed by: STUDENT IN AN ORGANIZED HEALTH CARE EDUCATION/TRAINING PROGRAM

## 2022-01-12 PROCEDURE — 36415 COLL VENOUS BLD VENIPUNCTURE: CPT | Performed by: STUDENT IN AN ORGANIZED HEALTH CARE EDUCATION/TRAINING PROGRAM

## 2022-01-12 PROCEDURE — 87389 HIV-1 AG W/HIV-1&-2 AB AG IA: CPT | Performed by: STUDENT IN AN ORGANIZED HEALTH CARE EDUCATION/TRAINING PROGRAM

## 2022-01-12 NOTE — RESEARCH
"  Screening Visit  Sponsor: [employer] "Miracle of Life Study"  Study: Observational Study of Pregnant Women to Validate Biomarkers of Pregnancy Complication Risk  IRB/Protocol #: 2021.103/ [id number]  Principle Investigator/ Sub-Investigator: Parker Evans MD  Site: [facility]  Location: [facility]  Subject Number: [id number]     Subject presented for enrollment in [employer] Life Study.  Subject is  20 weeks 0 days pregnant.  Subject meets all eligibility criteria for enrollment in study.     Prior to the Informed Consent (IC) being signed, or any study protocol required data collection, testing, procedure, or intervention being performed, the following was done and/or discussed:?   · Patient was given a copy of the IC for review??   · Purpose of the study and qualifications to participate??   · Study design, follow up schedule  · Confidentiality and HIPAA Authorization for Release of Medical Records for the research trial/ subject's rights/research related injury?   · Risk, Benefits, Compensation and Costs?   · Participation in the research trial is voluntary and patient may withdraw at anytime?   · Contact information for study related questions?      Patient verbalizes understanding of the above: Yes?   Contact information for CRC and PI given to patient: Yes?   Patient able to adequately summarize: the purpose of the study, the risks associated with the study, and all procedures, and follow-ups associated with the study: Yes?      Informed Consent:   Consenting was completed in private area using the most current IRB approved version of the ICF (Revised 10 Sep 2021) and patient was given ample time to review consent prior to execution of ICF.  Before signing consent, patient was asked if she had any questions and she stated "no".   Amrcie Adia signed the IRB approved version of informed consent form for the [employer]/ Miracle of Life research study.? Each page of the consent was reviewed with " the patient and all questions answered satisfactorily. The patient signed the paper consent form and received a copy of same (copy of informed consent made and provided to patient). The original consent was scanned into electronic medical records (EPIC).    Time of Consent Execution: 2:04 PM      Blood Sample Collection:      Was the sample collected?: Yes  Sample collected by: Yesica Huerta  Date of collection: 01/12/2022  Time of collection: 2:10 PM  Specimen Type: whole blood  Specimen shipped Fed EX Ref # [id number]  Shipped on 01/13/2022     Sample ID #: [id number]     Patient advised we would continue to follow her throughout her pregnancy and would follow up with her after her delivery, Patient verbalized understanding.     Patient received [employer] Card Token # [id number].  Research participant received $25 stipend.

## 2022-01-13 ENCOUNTER — PATIENT MESSAGE (OUTPATIENT)
Dept: PSYCHIATRY | Facility: CLINIC | Age: 32
End: 2022-01-13
Payer: COMMERCIAL

## 2022-01-13 ENCOUNTER — OFFICE VISIT (OUTPATIENT)
Dept: PSYCHIATRY | Facility: CLINIC | Age: 32
End: 2022-01-13
Payer: COMMERCIAL

## 2022-01-13 DIAGNOSIS — O99.340 DEPRESSION AFFECTING PREGNANCY: ICD-10-CM

## 2022-01-13 DIAGNOSIS — F41.1 GAD (GENERALIZED ANXIETY DISORDER): Primary | ICD-10-CM

## 2022-01-13 DIAGNOSIS — F32.A DEPRESSION AFFECTING PREGNANCY: ICD-10-CM

## 2022-01-13 LAB
HAV IGM SERPL QL IA: NEGATIVE
HBV CORE IGM SERPL QL IA: NEGATIVE
HBV SURFACE AG SERPL QL IA: NEGATIVE
HCV AB SERPL QL IA: NEGATIVE
HIV 1+2 AB+HIV1 P24 AG SERPL QL IA: NEGATIVE
RPR SER QL: NORMAL

## 2022-01-13 PROCEDURE — 90832 PR PSYCHOTHERAPY W/PATIENT, 30 MIN: ICD-10-PCS | Mod: 95,,, | Performed by: PSYCHOLOGIST

## 2022-01-13 PROCEDURE — 90832 PSYTX W PT 30 MINUTES: CPT | Mod: 95,,, | Performed by: PSYCHOLOGIST

## 2022-01-13 NOTE — PROGRESS NOTES
Individual Psychotherapy (PhD/LCSW)    1/13/2022    The patient location is: Patient's home in [place], [place].  The chief complaint leading to consultation is: anxiety, depression  Visit type: audiovisual  Face to Face time with patient: 30 minutes of total time spent on the encounter, which includes face to face time and non-face to face time preparing to see the patient (eg, review of tests), Obtaining and/or reviewing separately obtained history, Documenting clinical information in the electronic or other health record, Independently interpreting results (not separately reported) and communicating results to the patient/family/caregiver, or Care coordination (not separately reported).   Each patient to whom he or she provides medical services by telemedicine is:  (1) informed of the relationship between the physician and patient and the respective role of any other health care provider with respect to management of the patient; and (2) notified that he or she may decline to receive medical services by telemedicine and may withdraw from such care at any time.       Therapeutic Intervention: Met with patient.  Outpatient - Insight oriented psychotherapy 30 min - CPT code 07508 and Outpatient - Behavior modifying psychotherapy 30 min - CPT code 19520    Chief complaint/reason for encounter: depression and anxiety     Interval history and content of current session: Patient was timely for her individual therapy session via video visit. She reported she has not felt depressed at all in the past two weeks but more anxiety. She noted her anxiety is related to her having worries about the baby being okay, which resolved yesterday during the anatomy scan ultrasound when she was able to see the baby moving around and hear the heartbeat. Everything checked out well with the baby. She noted having pelvic pain at night, which she plans to discuss with her OB at her next appointment. We discussed her anxiety during the pregnancy  may persist and using challenging questions to help her change her perspective and develop more realistic, balanced thoughts. A 7-column thought record was shared in session as a tool to use for this technique. We also discussed self-care, distraction, and reframing pain and worries about birth. The Guide to Childbirth by Charu Neff was recommended to help the patient change her mindset from fear of labor to more empowerment. She indicated she will definitely get this book to read. She has a  set up and plans to take birthing classes as she progresses in her pregnancy.    Treatment plan:  · Target symptoms: depression, anxiety   · Why chosen therapy is appropriate versus another modality: relevant to diagnosis, evidence based practice  · Outcome monitoring methods: self-report, observation  · Therapeutic intervention type: insight oriented psychotherapy, behavior modifying psychotherapy    Risk parameters:  Patient reports no suicidal ideation  Patient reports no homicidal ideation  Patient reports no self-injurious behavior  Patient reports no violent behavior    Verbal deficits: None    Patient's response to intervention:  The patient's response to intervention is accepting.    Progress toward goals and other mental status changes:  The patient's progress toward goals is fair .    Mental Status Exam:  General Appearance:  unremarkable, age appropriate   Speech: normal tone, normal rate, normal pitch, normal volume      Level of Cooperation: cooperative      Thought Processes: normal and logical   Mood: euthymic      Thought Content: normal, no suicidality, no homicidality, delusions, or paranoia   Affect: congruent and appropriate   Orientation: Oriented x3   Attention Span & Concentration: intact   Judgment & Insight: fair     Language  intact     Diagnosis:     ICD-10-CM ICD-9-CM   1. CANDACE (generalized anxiety disorder)  F41.1 300.02   2. Depression affecting pregnancy  O99.340 648.40    F32.A 311      Plan:  individual psychotherapy and medication management by physician    Return to clinic: 3 weeks    Length of Service (minutes): 30

## 2022-01-26 ENCOUNTER — ROUTINE PRENATAL (OUTPATIENT)
Dept: OBSTETRICS AND GYNECOLOGY | Facility: CLINIC | Age: 32
End: 2022-01-26
Payer: COMMERCIAL

## 2022-01-26 VITALS — SYSTOLIC BLOOD PRESSURE: 146 MMHG | BODY MASS INDEX: 46.3 KG/M2 | WEIGHT: 293 LBS | DIASTOLIC BLOOD PRESSURE: 86 MMHG

## 2022-01-26 DIAGNOSIS — O99.340 DEPRESSION AFFECTING PREGNANCY: ICD-10-CM

## 2022-01-26 DIAGNOSIS — F32.A DEPRESSION AFFECTING PREGNANCY: ICD-10-CM

## 2022-01-26 DIAGNOSIS — O24.410 DIET CONTROLLED GESTATIONAL DIABETES MELLITUS (GDM) IN SECOND TRIMESTER: Primary | ICD-10-CM

## 2022-01-26 PROCEDURE — 0502F SUBSEQUENT PRENATAL CARE: CPT | Mod: CPTII,S$GLB,, | Performed by: STUDENT IN AN ORGANIZED HEALTH CARE EDUCATION/TRAINING PROGRAM

## 2022-01-26 PROCEDURE — 0502F PR SUBSEQUENT PRENATAL CARE: ICD-10-PCS | Mod: CPTII,S$GLB,, | Performed by: STUDENT IN AN ORGANIZED HEALTH CARE EDUCATION/TRAINING PROGRAM

## 2022-01-26 PROCEDURE — 99999 PR PBB SHADOW E&M-EST. PATIENT-LVL II: ICD-10-PCS | Mod: PBBFAC,,, | Performed by: STUDENT IN AN ORGANIZED HEALTH CARE EDUCATION/TRAINING PROGRAM

## 2022-01-26 PROCEDURE — 99999 PR PBB SHADOW E&M-EST. PATIENT-LVL II: CPT | Mod: PBBFAC,,, | Performed by: STUDENT IN AN ORGANIZED HEALTH CARE EDUCATION/TRAINING PROGRAM

## 2022-01-26 RX ORDER — LANCETS
EACH MISCELLANEOUS
Qty: 100 EACH | Refills: 2 | Status: ON HOLD | OUTPATIENT
Start: 2022-01-26 | End: 2022-05-10 | Stop reason: HOSPADM

## 2022-01-26 RX ORDER — INSULIN PUMP SYRINGE, 3 ML
EACH MISCELLANEOUS
Qty: 1 EACH | Refills: 0 | Status: SHIPPED | OUTPATIENT
Start: 2022-01-26 | End: 2022-05-10

## 2022-01-26 NOTE — PROGRESS NOTES
Pt doing much better. Denies vaginal bleeding, LOF, contractions. Reports regular fetal movement. Denies symptoms of pre E. Reports some RLP at times. Mood improved with seeing Cassandra. Blood pressures have been stable in mild range on connected mom.   Has been using Maximiliano's glucometer to screen for DM. Does not have formal report with her, but states the fastings have been 120's and post meals have been no higher than 190's. Has been cutting out softdrinks and carb-heavy foods. Sounds like more than 50% are out of range, would therefore consider starting the diagnosis of diabetes now. Reviewed with patient that this would be managed with insulin or metformin, insulin preferred. Pt would like to avoid using more needles, etc. Will send in report of glucoses at the end of this week. Plan to start metformin. A1C with CBC at next visit as opposed to glucola. Reviewed that she would need growth scans due to this and patient already has one repeat anatomy scan ordered. Declines MFM or nutrition consultation at this time. Sounds like she has been happy to watch what different foods do to her sugars and how they affect her. Seems motivated. Her  is very good with his diabetes and they feel together they can manage this.   VS reviewed.  Labor and pre E precautions reviewed. New recommendations, precautions, routines on L&D in light of Covid crisis reviewed.  RTC: 4 weeks

## 2022-02-03 ENCOUNTER — OFFICE VISIT (OUTPATIENT)
Dept: PSYCHIATRY | Facility: CLINIC | Age: 32
End: 2022-02-03
Payer: COMMERCIAL

## 2022-02-03 DIAGNOSIS — F32.A DEPRESSION AFFECTING PREGNANCY: ICD-10-CM

## 2022-02-03 DIAGNOSIS — F41.1 GAD (GENERALIZED ANXIETY DISORDER): Primary | ICD-10-CM

## 2022-02-03 DIAGNOSIS — O99.340 DEPRESSION AFFECTING PREGNANCY: ICD-10-CM

## 2022-02-03 PROCEDURE — 90834 PR PSYCHOTHERAPY W/PATIENT, 45 MIN: ICD-10-PCS | Mod: 95,,, | Performed by: PSYCHOLOGIST

## 2022-02-03 PROCEDURE — 90834 PSYTX W PT 45 MINUTES: CPT | Mod: 95,,, | Performed by: PSYCHOLOGIST

## 2022-02-04 ENCOUNTER — PATIENT MESSAGE (OUTPATIENT)
Dept: OBSTETRICS AND GYNECOLOGY | Facility: CLINIC | Age: 32
End: 2022-02-04
Payer: COMMERCIAL

## 2022-02-04 NOTE — PROGRESS NOTES
Individual Psychotherapy (PhD/LCSW)    2/3/2022    The patient location is: Patient's home in [place], [place].  The chief complaint leading to consultation is: anxiety, depression  Visit type: audiovisual  Face to Face time with patient: 45 minutes of total time spent on the encounter, which includes face to face time and non-face to face time preparing to see the patient (eg, review of tests), Obtaining and/or reviewing separately obtained history, Documenting clinical information in the electronic or other health record, Independently interpreting results (not separately reported) and communicating results to the patient/family/caregiver, or Care coordination (not separately reported).   Each patient to whom he or she provides medical services by telemedicine is:  (1) informed of the relationship between the physician and patient and the respective role of any other health care provider with respect to management of the patient; and (2) notified that he or she may decline to receive medical services by telemedicine and may withdraw from such care at any time.       Therapeutic Intervention: Met with patient.  Outpatient - Insight oriented psychotherapy 45 min - CPT code 01604 and Outpatient - Behavior modifying psychotherapy 45 min - CPT code 94895    Chief complaint/reason for encounter: depression and anxiety     Interval history and content of current session: Patient was timely for her individual therapy session via video visit. She reported since her grandmother left over the weekend, she has started feeling down again and having negative thoughts about the baby's health and her ability to be a parent. We discussed how going from being very occupied with projects while her grandmother was visiting to minimal activity since her departure likely contributed to this let down of emotions. She completed thought records to challenge her negative thoughts, which were reviewed in session. She brought up her difficulty  wanting to be independent as she is accustomed to but also wanting and needing her . She is still getting used to having a supportive partner in her life. We discussed starting yoga and mindfulness meditation practice to focus more on the present moment and less on the future. She indicated she has not started thinking about birth yet as it makes it so real. For the next two weeks she will start meditation and yoga practice and continue thought records.    Treatment plan:  · Target symptoms: depression, anxiety   · Why chosen therapy is appropriate versus another modality: relevant to diagnosis, evidence based practice  · Outcome monitoring methods: self-report, observation  · Therapeutic intervention type: insight oriented psychotherapy, behavior modifying psychotherapy    Risk parameters:  Patient reports no suicidal ideation  Patient reports no homicidal ideation  Patient reports no self-injurious behavior  Patient reports no violent behavior    Verbal deficits: None    Patient's response to intervention:  The patient's response to intervention is accepting.    Progress toward goals and other mental status changes:  The patient's progress toward goals is fair .    Mental Status Exam:  General Appearance:  unremarkable, age appropriate   Speech: normal tone, normal rate, normal pitch, normal volume      Level of Cooperation: cooperative      Thought Processes: normal and logical   Mood: euthymic      Thought Content: normal, no suicidality, no homicidality, delusions, or paranoia   Affect: congruent and appropriate   Orientation: Oriented x3   Attention Span & Concentration: intact   Judgment & Insight: fair     Language  intact     Diagnosis:     ICD-10-CM ICD-9-CM   1. CANDACE (generalized anxiety disorder)  F41.1 300.02   2. Depression affecting pregnancy  O99.340 648.40    F32.A 311     Plan:  individual psychotherapy and medication management by physician    Return to clinic: 2 weeks    Length of Service  (minutes): 45

## 2022-02-14 ENCOUNTER — PATIENT MESSAGE (OUTPATIENT)
Dept: OBSTETRICS AND GYNECOLOGY | Facility: CLINIC | Age: 32
End: 2022-02-14
Payer: COMMERCIAL

## 2022-02-15 RX ORDER — METFORMIN HYDROCHLORIDE 500 MG/1
500 TABLET ORAL
Qty: 90 TABLET | Refills: 3 | Status: SHIPPED | OUTPATIENT
Start: 2022-02-15 | End: 2022-03-02

## 2022-02-18 ENCOUNTER — OFFICE VISIT (OUTPATIENT)
Dept: PSYCHIATRY | Facility: CLINIC | Age: 32
End: 2022-02-18
Payer: COMMERCIAL

## 2022-02-18 DIAGNOSIS — F32.A DEPRESSION AFFECTING PREGNANCY: ICD-10-CM

## 2022-02-18 DIAGNOSIS — F41.1 GAD (GENERALIZED ANXIETY DISORDER): Primary | ICD-10-CM

## 2022-02-18 DIAGNOSIS — O99.340 DEPRESSION AFFECTING PREGNANCY: ICD-10-CM

## 2022-02-18 PROCEDURE — 90834 PSYTX W PT 45 MINUTES: CPT | Mod: 95,,, | Performed by: PSYCHOLOGIST

## 2022-02-18 PROCEDURE — 90834 PR PSYCHOTHERAPY W/PATIENT, 45 MIN: ICD-10-PCS | Mod: 95,,, | Performed by: PSYCHOLOGIST

## 2022-02-21 NOTE — PROGRESS NOTES
Individual Psychotherapy (PhD/LCSW)    2/18/2022    The patient location is: Patient's home in [place], [place].  The chief complaint leading to consultation is: anxiety, depression  Visit type: audiovisual  Face to Face time with patient: 45 minutes of total time spent on the encounter, which includes face to face time and non-face to face time preparing to see the patient (eg, review of tests), Obtaining and/or reviewing separately obtained history, Documenting clinical information in the electronic or other health record, Independently interpreting results (not separately reported) and communicating results to the patient/family/caregiver, or Care coordination (not separately reported).   Each patient to whom he or she provides medical services by telemedicine is:  (1) informed of the relationship between the physician and patient and the respective role of any other health care provider with respect to management of the patient; and (2) notified that he or she may decline to receive medical services by telemedicine and may withdraw from such care at any time.       Therapeutic Intervention: Met with patient.  Outpatient - Insight oriented psychotherapy 45 min - CPT code 51103 and Outpatient - Behavior modifying psychotherapy 45 min - CPT code 07800    Chief complaint/reason for encounter: depression and anxiety     Interval history and content of current session: Patient was timely for her individual therapy session via video visit. She reported having some good days while her mother and aunt were visiting but again when they left, she felt down again. She is able to identify that being idle is not good for her. She likes to have projects to focus on and/or the company of others. We problem solved around this as well as challenged any negative thoughts in this area. She shared how she has been being mean to her  at times, and we worked to identify her irrational thoughts/fears that lead to her reactions to  him. She worked on sharing this information with her  and practice how to be assertive, rather than aggressive with him. She indicated she is feeling more connected to the baby due to feeling the baby move much more lately. She is still worried about getting to attached due to fears of something going wrong.    Treatment plan:  · Target symptoms: depression, anxiety   · Why chosen therapy is appropriate versus another modality: relevant to diagnosis, evidence based practice  · Outcome monitoring methods: self-report, observation  · Therapeutic intervention type: insight oriented psychotherapy, behavior modifying psychotherapy    Risk parameters:  Patient reports no suicidal ideation  Patient reports no homicidal ideation  Patient reports no self-injurious behavior  Patient reports no violent behavior    Verbal deficits: None    Patient's response to intervention:  The patient's response to intervention is accepting.    Progress toward goals and other mental status changes:  The patient's progress toward goals is fair .    Mental Status Exam:  General Appearance:  unremarkable, age appropriate   Speech: normal tone, normal rate, normal pitch, normal volume      Level of Cooperation: cooperative      Thought Processes: normal and logical   Mood: euthymic      Thought Content: normal, no suicidality, no homicidality, delusions, or paranoia   Affect: congruent and appropriate   Orientation: Oriented x3   Attention Span & Concentration: intact   Judgment & Insight: fair     Language  intact     Diagnosis:     ICD-10-CM ICD-9-CM   1. CANDACE (generalized anxiety disorder)  F41.1 300.02   2. Depression affecting pregnancy  O99.340 648.40    F32.A 311     Plan:  individual psychotherapy and medication management by physician    Return to clinic: 1 month    Length of Service (minutes): 45

## 2022-02-22 ENCOUNTER — PATIENT MESSAGE (OUTPATIENT)
Dept: MATERNAL FETAL MEDICINE | Facility: CLINIC | Age: 32
End: 2022-02-22
Payer: COMMERCIAL

## 2022-02-23 ENCOUNTER — ROUTINE PRENATAL (OUTPATIENT)
Dept: OBSTETRICS AND GYNECOLOGY | Facility: CLINIC | Age: 32
End: 2022-02-23
Payer: COMMERCIAL

## 2022-02-23 ENCOUNTER — PROCEDURE VISIT (OUTPATIENT)
Dept: MATERNAL FETAL MEDICINE | Facility: CLINIC | Age: 32
End: 2022-02-23
Payer: COMMERCIAL

## 2022-02-23 ENCOUNTER — LAB VISIT (OUTPATIENT)
Dept: LAB | Facility: OTHER | Age: 32
End: 2022-02-23
Attending: STUDENT IN AN ORGANIZED HEALTH CARE EDUCATION/TRAINING PROGRAM
Payer: COMMERCIAL

## 2022-02-23 VITALS
SYSTOLIC BLOOD PRESSURE: 126 MMHG | WEIGHT: 292.31 LBS | BODY MASS INDEX: 45.79 KG/M2 | DIASTOLIC BLOOD PRESSURE: 76 MMHG

## 2022-02-23 DIAGNOSIS — O99.210 OBESITY IN PREGNANCY: ICD-10-CM

## 2022-02-23 DIAGNOSIS — Z28.39 RUBELLA NON-IMMUNE STATUS, ANTEPARTUM: ICD-10-CM

## 2022-02-23 DIAGNOSIS — O09.899 RUBELLA NON-IMMUNE STATUS, ANTEPARTUM: ICD-10-CM

## 2022-02-23 DIAGNOSIS — Z36.2 ENCOUNTER FOR FOLLOW-UP ULTRASOUND OF FETAL ANATOMY: ICD-10-CM

## 2022-02-23 DIAGNOSIS — O99.212 OBESITY AFFECTING PREGNANCY IN SECOND TRIMESTER: ICD-10-CM

## 2022-02-23 DIAGNOSIS — O24.410 DIET CONTROLLED GESTATIONAL DIABETES MELLITUS (GDM) IN SECOND TRIMESTER: ICD-10-CM

## 2022-02-23 DIAGNOSIS — Z36.89 ENCOUNTER FOR ULTRASOUND TO ASSESS FETAL GROWTH: Primary | ICD-10-CM

## 2022-02-23 DIAGNOSIS — O99.212 OBESITY AFFECTING PREGNANCY IN SECOND TRIMESTER: Primary | ICD-10-CM

## 2022-02-23 DIAGNOSIS — O24.912 DIABETES MELLITUS DURING PREGNANCY IN SECOND TRIMESTER, UNSPECIFIED DIABETES MELLITUS TYPE: ICD-10-CM

## 2022-02-23 LAB
BASOPHILS # BLD AUTO: 0.02 K/UL (ref 0–0.2)
BASOPHILS NFR BLD: 0.1 % (ref 0–1.9)
DIFFERENTIAL METHOD: ABNORMAL
EOSINOPHIL # BLD AUTO: 0.1 K/UL (ref 0–0.5)
EOSINOPHIL NFR BLD: 0.5 % (ref 0–8)
ERYTHROCYTE [DISTWIDTH] IN BLOOD BY AUTOMATED COUNT: 15.2 % (ref 11.5–14.5)
ESTIMATED AVG GLUCOSE: 80 MG/DL (ref 68–131)
HBA1C MFR BLD: 4.4 % (ref 4–5.6)
HCT VFR BLD AUTO: 33.6 % (ref 37–48.5)
HGB BLD-MCNC: 11 G/DL (ref 12–16)
IMM GRANULOCYTES # BLD AUTO: 0.09 K/UL (ref 0–0.04)
IMM GRANULOCYTES NFR BLD AUTO: 0.7 % (ref 0–0.5)
LYMPHOCYTES # BLD AUTO: 2.8 K/UL (ref 1–4.8)
LYMPHOCYTES NFR BLD: 20.2 % (ref 18–48)
MCH RBC QN AUTO: 29.9 PG (ref 27–31)
MCHC RBC AUTO-ENTMCNC: 32.7 G/DL (ref 32–36)
MCV RBC AUTO: 91 FL (ref 82–98)
MONOCYTES # BLD AUTO: 0.9 K/UL (ref 0.3–1)
MONOCYTES NFR BLD: 6.4 % (ref 4–15)
NEUTROPHILS # BLD AUTO: 10 K/UL (ref 1.8–7.7)
NEUTROPHILS NFR BLD: 72.1 % (ref 38–73)
NRBC BLD-RTO: 0 /100 WBC
PLATELET # BLD AUTO: 286 K/UL (ref 150–450)
PMV BLD AUTO: 11 FL (ref 9.2–12.9)
RBC # BLD AUTO: 3.68 M/UL (ref 4–5.4)
WBC # BLD AUTO: 13.83 K/UL (ref 3.9–12.7)

## 2022-02-23 PROCEDURE — 76816 OB US FOLLOW-UP PER FETUS: CPT | Mod: S$GLB,,, | Performed by: OBSTETRICS & GYNECOLOGY

## 2022-02-23 PROCEDURE — 76816 PR  US,PREGNANT UTERUS,F/U,TRANSABD APP: ICD-10-PCS | Mod: S$GLB,,, | Performed by: OBSTETRICS & GYNECOLOGY

## 2022-02-23 PROCEDURE — 99999 PR PBB SHADOW E&M-EST. PATIENT-LVL III: ICD-10-PCS | Mod: PBBFAC,,, | Performed by: STUDENT IN AN ORGANIZED HEALTH CARE EDUCATION/TRAINING PROGRAM

## 2022-02-23 PROCEDURE — 85025 COMPLETE CBC W/AUTO DIFF WBC: CPT | Performed by: STUDENT IN AN ORGANIZED HEALTH CARE EDUCATION/TRAINING PROGRAM

## 2022-02-23 PROCEDURE — 99999 PR PBB SHADOW E&M-EST. PATIENT-LVL III: CPT | Mod: PBBFAC,,, | Performed by: STUDENT IN AN ORGANIZED HEALTH CARE EDUCATION/TRAINING PROGRAM

## 2022-02-23 PROCEDURE — 83036 HEMOGLOBIN GLYCOSYLATED A1C: CPT | Performed by: STUDENT IN AN ORGANIZED HEALTH CARE EDUCATION/TRAINING PROGRAM

## 2022-02-23 PROCEDURE — 0502F SUBSEQUENT PRENATAL CARE: CPT | Mod: CPTII,S$GLB,, | Performed by: STUDENT IN AN ORGANIZED HEALTH CARE EDUCATION/TRAINING PROGRAM

## 2022-02-23 PROCEDURE — 36415 COLL VENOUS BLD VENIPUNCTURE: CPT | Performed by: STUDENT IN AN ORGANIZED HEALTH CARE EDUCATION/TRAINING PROGRAM

## 2022-02-23 PROCEDURE — 0502F PR SUBSEQUENT PRENATAL CARE: ICD-10-PCS | Mod: CPTII,S$GLB,, | Performed by: STUDENT IN AN ORGANIZED HEALTH CARE EDUCATION/TRAINING PROGRAM

## 2022-02-23 RX ORDER — ESCITALOPRAM OXALATE 20 MG/1
20 TABLET ORAL DAILY
Qty: 30 TABLET | Refills: 2 | Status: SHIPPED | OUTPATIENT
Start: 2022-02-23 | End: 2022-12-22

## 2022-02-23 NOTE — PROGRESS NOTES
Pt doing well. When she got up at 8 am had what looked like dry. Denies LOF, contractions. Reports regular fetal movement. Denies symptoms of pre E.   Having some RLP. Belly band and comfort measures reviewed  VS reviewed.  Glucose log reviewed. Started on metformin nightly one week ago. Fastings are still about 101 on average. Some gas, but no diarrhea or N/V  Reports US tech said growth was 68% and borderline polyH. Reviewed with Ike and Maximiliano that this may be due to uncontrolled DM. If so on final report -recommend MFM referral. Pt amenable to this. Will send in log next week and if still elevated, consider met 1000nightly  Labor and pre E precautions reviewed. New recommendations, precautions, routines on L&D in light of Covid crisis reviewed.  RTC: 4 weeks

## 2022-02-23 NOTE — PATIENT INSTRUCTIONS
Tdap or Dtap if not had in the past five years    Fasting goals <90  2 hours after meals goal <120    https://www.[facility].org/womens-health/faqs/coronavirus-covid-19-pregnancy-and-breastfeeding    Vaccine safety in pregnancy and breastfeeding:   https://www.[facility].org/womens-health/experts-and-stories/the-latest/uju-rr-ht-know-the-covid-19-vaccines-are-safe-and-effective-one-expert-explains    Vaccine safety in the patient concerned about future fertility:  https://www.[facility].org/news-and-publications/covid-19/statements/patient-management-and-clinical-recommendations-during-the-coronavirus-covid-19-pandemic/    Vaccine even after being infected with Covid:  https://stm.sciencemag.org/content/13/600/tgfm3428    JENNI, Labor and Delivery is on the 6th floor of [facility] [place]: [phone]    [email or web address]    [email or web address]/[email or web address]/[email or web address]-visitor-policy      Flu vaccine in pregnancy:  https://www.[facility].org/womens-health/faqs/the-flu-vaccine-and-pregnancy

## 2022-03-02 ENCOUNTER — PATIENT MESSAGE (OUTPATIENT)
Dept: OBSTETRICS AND GYNECOLOGY | Facility: CLINIC | Age: 32
End: 2022-03-02
Payer: COMMERCIAL

## 2022-03-02 RX ORDER — METFORMIN HYDROCHLORIDE 500 MG/1
1000 TABLET ORAL
Qty: 180 TABLET | Refills: 3 | Status: SHIPPED | OUTPATIENT
Start: 2022-03-02 | End: 2022-03-23

## 2022-03-10 ENCOUNTER — PATIENT MESSAGE (OUTPATIENT)
Dept: OBSTETRICS AND GYNECOLOGY | Facility: CLINIC | Age: 32
End: 2022-03-10
Payer: COMMERCIAL

## 2022-03-13 ENCOUNTER — NURSE TRIAGE (OUTPATIENT)
Dept: ADMINISTRATIVE | Facility: CLINIC | Age: 32
End: 2022-03-13
Payer: COMMERCIAL

## 2022-03-13 ENCOUNTER — HOSPITAL ENCOUNTER (OUTPATIENT)
Facility: HOSPITAL | Age: 32
Discharge: HOME | End: 2022-03-13
Attending: SPECIALIST | Admitting: SPECIALIST
Payer: COMMERCIAL

## 2022-03-13 VITALS
OXYGEN SATURATION: 100 % | WEIGHT: 293 LBS | BODY MASS INDEX: 45.99 KG/M2 | TEMPERATURE: 97 F | RESPIRATION RATE: 16 BRPM | HEART RATE: 88 BPM | SYSTOLIC BLOOD PRESSURE: 136 MMHG | HEIGHT: 67 IN | DIASTOLIC BLOOD PRESSURE: 72 MMHG

## 2022-03-13 DIAGNOSIS — N89.8 VAGINAL DISCHARGE DURING PREGNANCY: ICD-10-CM

## 2022-03-13 DIAGNOSIS — O26.899 VAGINAL DISCHARGE DURING PREGNANCY: ICD-10-CM

## 2022-03-13 LAB
BACTERIA #/AREA URNS HPF: ABNORMAL /HPF
BILIRUB UR QL STRIP: NEGATIVE
CLARITY UR: ABNORMAL
COLOR UR: YELLOW
CTP QC/QA: YES
GLUCOSE UR QL STRIP: NEGATIVE
HGB UR QL STRIP: NEGATIVE
HYALINE CASTS #/AREA URNS LPF: 16 /LPF
KETONES UR QL STRIP: ABNORMAL
LEUKOCYTE ESTERASE UR QL STRIP: ABNORMAL
MICROSCOPIC COMMENT: ABNORMAL
NITRITE UR QL STRIP: NEGATIVE
PH UR STRIP: 6 [PH] (ref 5–8)
PROT UR QL STRIP: ABNORMAL
RBC #/AREA URNS HPF: 5 /HPF (ref 0–4)
RUPTURE OF MEMBRANE: NEGATIVE
SP GR UR STRIP: 1.02 (ref 1–1.03)
SQUAMOUS #/AREA URNS HPF: 22 /HPF
URN SPEC COLLECT METH UR: ABNORMAL
UROBILINOGEN UR STRIP-ACNC: NEGATIVE EU/DL
WBC #/AREA URNS HPF: 11 /HPF (ref 0–5)

## 2022-03-13 PROCEDURE — 87086 URINE CULTURE/COLONY COUNT: CPT | Performed by: SPECIALIST

## 2022-03-13 PROCEDURE — 99211 OFF/OP EST MAY X REQ PHY/QHP: CPT

## 2022-03-13 PROCEDURE — 81001 URINALYSIS AUTO W/SCOPE: CPT | Performed by: SPECIALIST

## 2022-03-13 RX ORDER — FLUCONAZOLE 150 MG/1
150 TABLET ORAL DAILY
Qty: 1 TABLET | Refills: 0 | Status: SHIPPED | OUTPATIENT
Start: 2022-03-13 | End: 2022-03-14

## 2022-03-13 NOTE — TELEPHONE ENCOUNTER
"  Reason for Disposition   Leakage of fluid from vagina    Additional Information   Negative: [1] SEVERE abdominal pain (e.g., excruciating) AND [2] constant AND [3] present > 1 hour   Negative: Severe bleeding (e.g., continuous red blood from vagina, or large blood clots)   Negative: Umbilical cord hanging out of the vagina (shiny, white, curled appearance, "like telephone cord")   Negative: Uncontrollable urge to push (i.e., feels like baby is coming out now)   Negative: Can see baby   Negative: Sounds like a life-threatening emergency to the triager   Negative: < 20 weeks pregnant   Negative: Vaginal bleeding    Protocols used: PREGNANCY - RUPTURE OF CXOALMGMF-O-JO  28 wk 6 day preg   pt called re woke with underwear soaked, no bleeding. denies abd pain. rec L&D now. Pt agrees. Call back with questions   "

## 2022-03-13 NOTE — NURSING
[facility]  Department of Obstetrics and Gynecology  Labor & Delivery Triage Assessment    PATIENT NAME: Marcie Cheek  MRN: [id number]  TODAY'S DATE: 2022    CHIEF COMPLAINT: Vaginal Discharge      OB History    Para Term  AB Living   1 0 0 0 0 0   SAB IAB Ectopic Multiple Live Births   0 0 0 0 0      # Outcome Date GA Lbr Harrison/2nd Weight Sex Delivery Anes PTL Lv   1 Current              Past Medical History:   Diagnosis Date    Anxiety     Depression     Hypertension      Past Surgical History:   Procedure Laterality Date    HYSTEROSCOPY      WISDOM TOOTH EXTRACTION           VITAL SIGNS - ABNORMAL VITALS INCLUDE TEMP >100.4,RR <12 or >26, SUSTAINED MATERNAL PULSE <60 or >120     VITAL SIGNS (Most Recent)  Temp: 96.8 °F (36 °C) (22)  Pulse: 88 (22)  Resp: 16 (22)  BP: 136/72 (22)  SpO2: 100 % (22)    VITAL SIGNS     normal  HEADACHE    no     VOMITING    no  VISUAL DISTURBANCES  no  EPIGASTRIC PAIN        no  PROTEINURIA 2+ or MORE             no   EDEMA FACE/EXTREMITIES            no    FETAL MOVEMENT     FETAL MOVEMENT: Active  FETAL HEART RATE BASELINE =  145  normal  FETAL HEART RATE VARIABILITY:  Moderate  FETAL HEART RATE ACCELERATIONS FOR GESTATIONAL AGE: present  FETAL HEART RATE DECELERATIONS: none    ABDOMINAL PAIN/CRAMPING/CONTRACTIONS     Patient is not complaining of abdominal pain/cramping/contractions.    RUPTURE OF MEMBRANES OR LEAKING OF AMNIOTIC FLUID     Patient reports leaking of amniotic fluid and is clear in color and reporting amount as small. Nitrating is Negative. ROM plus collected is Negative. GBS status is Unknown.    VAGINAL BLEEDING     Patient denies vaginal bleeding.    VAGINAL EXAM     DILATION:    STATION:    EFFACEMENT:    PRESENTATION:      VAGINAL EXAM DEFERRED DUE TO:  not ordered by MD    PAIN PRESENT ON ARRIVAL     Patient is not reporting pain    EXACERBATION OF CHRONIC  CONDITION (i.e. DM, ASTHMA, HTN)     N/A    PATIENT DISPOSITION     Discharged Home      Dr. Lerma notified at 1420 of the above assessment.    Urmila Graves RN  Formerly [facility]  03/13/2022

## 2022-03-14 ENCOUNTER — PATIENT MESSAGE (OUTPATIENT)
Dept: OBSTETRICS AND GYNECOLOGY | Facility: CLINIC | Age: 32
End: 2022-03-14
Payer: COMMERCIAL

## 2022-03-14 DIAGNOSIS — O24.913 DIABETES MELLITUS AFFECTING PREGNANCY IN THIRD TRIMESTER: ICD-10-CM

## 2022-03-14 DIAGNOSIS — F32.A DEPRESSION AFFECTING PREGNANCY: Primary | ICD-10-CM

## 2022-03-14 DIAGNOSIS — O99.213 OBESITY AFFECTING PREGNANCY IN THIRD TRIMESTER: ICD-10-CM

## 2022-03-14 DIAGNOSIS — O99.340 DEPRESSION AFFECTING PREGNANCY: Primary | ICD-10-CM

## 2022-03-15 ENCOUNTER — PATIENT MESSAGE (OUTPATIENT)
Dept: MATERNAL FETAL MEDICINE | Facility: CLINIC | Age: 32
End: 2022-03-15
Payer: COMMERCIAL

## 2022-03-15 ENCOUNTER — PATIENT MESSAGE (OUTPATIENT)
Dept: OBSTETRICS AND GYNECOLOGY | Facility: CLINIC | Age: 32
End: 2022-03-15
Payer: COMMERCIAL

## 2022-03-15 DIAGNOSIS — O99.213 OBESITY COMPLICATING PREGNANCY IN THIRD TRIMESTER: Primary | ICD-10-CM

## 2022-03-15 LAB
BACTERIA UR CULT: NORMAL
BACTERIA UR CULT: NORMAL

## 2022-03-16 ENCOUNTER — OFFICE VISIT (OUTPATIENT)
Dept: BEHAVIORAL HEALTH | Facility: CLINIC | Age: 32
End: 2022-03-16
Payer: COMMERCIAL

## 2022-03-16 DIAGNOSIS — F41.1 GAD (GENERALIZED ANXIETY DISORDER): Primary | ICD-10-CM

## 2022-03-16 DIAGNOSIS — O99.343 DEPRESSION AFFECTING PREGNANCY IN THIRD TRIMESTER, ANTEPARTUM: ICD-10-CM

## 2022-03-16 DIAGNOSIS — F32.A DEPRESSION AFFECTING PREGNANCY IN THIRD TRIMESTER, ANTEPARTUM: ICD-10-CM

## 2022-03-16 PROCEDURE — 3044F HG A1C LEVEL LT 7.0%: CPT | Mod: CPTII,95,, | Performed by: PSYCHOLOGIST

## 2022-03-16 PROCEDURE — 3044F PR MOST RECENT HEMOGLOBIN A1C LEVEL <7.0%: ICD-10-PCS | Mod: CPTII,95,, | Performed by: PSYCHOLOGIST

## 2022-03-16 PROCEDURE — 90834 PSYTX W PT 45 MINUTES: CPT | Mod: 95,,, | Performed by: PSYCHOLOGIST

## 2022-03-16 PROCEDURE — 90834 PR PSYCHOTHERAPY W/PATIENT, 45 MIN: ICD-10-PCS | Mod: 95,,, | Performed by: PSYCHOLOGIST

## 2022-03-16 NOTE — PROGRESS NOTES
Individual Psychotherapy (PhD/LCSW)    3/16/2022    The patient location is: Patient's home in [place], [place].  The chief complaint leading to consultation is: anxiety, depression  Visit type: audiovisual  Face to Face time with patient: 45 minutes of total time spent on the encounter, which includes face to face time and non-face to face time preparing to see the patient (eg, review of tests), Obtaining and/or reviewing separately obtained history, Documenting clinical information in the electronic or other health record, Independently interpreting results (not separately reported) and communicating results to the patient/family/caregiver, or Care coordination (not separately reported).   Each patient to whom he or she provides medical services by telemedicine is:  (1) informed of the relationship between the physician and patient and the respective role of any other health care provider with respect to management of the patient; and (2) notified that he or she may decline to receive medical services by telemedicine and may withdraw from such care at any time.       Therapeutic Intervention: Met with patient.  Outpatient - Insight oriented psychotherapy 45 min - CPT code 08648 and Outpatient - Behavior modifying psychotherapy 45 min - CPT code 65261    Chief complaint/reason for encounter: depression and anxiety     Interval history and content of current session: Patient was timely for her individual therapy session via video visit. She reported her mood has been stable and not depressed. She has continued to be more excited about the baby, even though she noted she still feels like she is watching a movie and it is not real. She went to the [facility] last weekend as she thought her membranes ruptured, but it was a false alarm. She reported she was very calm while at the hospital listening to the baby's heartbeat. She noted the more she is able to hear, see, or feel the baby, the less anxious she feels. She is somewhat  concerned about her physical health as her blood sugar has been spiking and she has been referred to [facility]. She is trying to look at the positive aspect of this referral because she will now be followed more closely until the end of her pregnancy. She shared how she and her  have been communicating better lately, and she is finally allowing herself to need him and not push him away. Lastly, she noted she has been thinking more about her resentments towards her mother from childhood and is starting to let some of it go and forgive her mother, trying to see her side with a history of trauma herself.    Treatment plan:  · Target symptoms: depression, anxiety   · Why chosen therapy is appropriate versus another modality: relevant to diagnosis, evidence based practice  · Outcome monitoring methods: self-report, observation  · Therapeutic intervention type: insight oriented psychotherapy, behavior modifying psychotherapy    Risk parameters:  Patient reports no suicidal ideation  Patient reports no homicidal ideation  Patient reports no self-injurious behavior  Patient reports no violent behavior    Verbal deficits: None    Patient's response to intervention:  The patient's response to intervention is accepting.    Progress toward goals and other mental status changes:  The patient's progress toward goals is fair .    Mental Status Exam:  General Appearance:  unremarkable, age appropriate   Speech: normal tone, normal rate, normal pitch, normal volume      Level of Cooperation: cooperative      Thought Processes: normal and logical   Mood: euthymic      Thought Content: normal, no suicidality, no homicidality, delusions, or paranoia   Affect: congruent and appropriate   Orientation: Oriented x3   Attention Span & Concentration: intact   Judgment & Insight: fair     Language  intact     Diagnosis:     ICD-10-CM ICD-9-CM   1. CANDACE (generalized anxiety disorder)  F41.1 300.02   2. Depression affecting pregnancy in third  trimester, antepartum  O99.343 648.43    F32.A 311     Plan:  individual psychotherapy and medication management by physician    Return to clinic: 2 weeks    Length of Service (minutes): 45

## 2022-03-22 ENCOUNTER — PATIENT MESSAGE (OUTPATIENT)
Dept: MATERNAL FETAL MEDICINE | Facility: CLINIC | Age: 32
End: 2022-03-22
Payer: COMMERCIAL

## 2022-03-23 ENCOUNTER — PROCEDURE VISIT (OUTPATIENT)
Dept: MATERNAL FETAL MEDICINE | Facility: CLINIC | Age: 32
End: 2022-03-23
Payer: COMMERCIAL

## 2022-03-23 ENCOUNTER — OFFICE VISIT (OUTPATIENT)
Dept: MATERNAL FETAL MEDICINE | Facility: CLINIC | Age: 32
End: 2022-03-23
Payer: COMMERCIAL

## 2022-03-23 ENCOUNTER — PATIENT MESSAGE (OUTPATIENT)
Dept: OBSTETRICS AND GYNECOLOGY | Facility: CLINIC | Age: 32
End: 2022-03-23
Payer: COMMERCIAL

## 2022-03-23 ENCOUNTER — LAB VISIT (OUTPATIENT)
Dept: LAB | Facility: OTHER | Age: 32
End: 2022-03-23
Attending: STUDENT IN AN ORGANIZED HEALTH CARE EDUCATION/TRAINING PROGRAM
Payer: COMMERCIAL

## 2022-03-23 VITALS
SYSTOLIC BLOOD PRESSURE: 138 MMHG | HEIGHT: 67 IN | WEIGHT: 293 LBS | DIASTOLIC BLOOD PRESSURE: 82 MMHG | BODY MASS INDEX: 45.99 KG/M2

## 2022-03-23 DIAGNOSIS — O24.913 DIABETES MELLITUS AFFECTING PREGNANCY IN THIRD TRIMESTER: Primary | ICD-10-CM

## 2022-03-23 DIAGNOSIS — F32.A DEPRESSION AFFECTING PREGNANCY: ICD-10-CM

## 2022-03-23 DIAGNOSIS — O10.919 CHRONIC HYPERTENSION AFFECTING PREGNANCY: ICD-10-CM

## 2022-03-23 DIAGNOSIS — O99.340 DEPRESSION AFFECTING PREGNANCY: ICD-10-CM

## 2022-03-23 DIAGNOSIS — O99.213 OBESITY AFFECTING PREGNANCY IN THIRD TRIMESTER: ICD-10-CM

## 2022-03-23 DIAGNOSIS — O24.913 DIABETES MELLITUS AFFECTING PREGNANCY IN THIRD TRIMESTER: ICD-10-CM

## 2022-03-23 DIAGNOSIS — O99.213 OBESITY AFFECTING PREGNANCY IN THIRD TRIMESTER: Primary | ICD-10-CM

## 2022-03-23 DIAGNOSIS — R74.8 ELEVATED LIVER ENZYMES: ICD-10-CM

## 2022-03-23 LAB
ALBUMIN SERPL BCP-MCNC: 3.1 G/DL (ref 3.5–5.2)
ALP SERPL-CCNC: 98 U/L (ref 55–135)
ALT SERPL W/O P-5'-P-CCNC: 13 U/L (ref 10–44)
ANION GAP SERPL CALC-SCNC: 12 MMOL/L (ref 8–16)
AST SERPL-CCNC: 16 U/L (ref 10–40)
BASOPHILS # BLD AUTO: 0.03 K/UL (ref 0–0.2)
BASOPHILS NFR BLD: 0.2 % (ref 0–1.9)
BILIRUB SERPL-MCNC: 0.4 MG/DL (ref 0.1–1)
BUN SERPL-MCNC: 6 MG/DL (ref 6–20)
CALCIUM SERPL-MCNC: 9.6 MG/DL (ref 8.7–10.5)
CHLORIDE SERPL-SCNC: 106 MMOL/L (ref 95–110)
CO2 SERPL-SCNC: 18 MMOL/L (ref 23–29)
CREAT SERPL-MCNC: 0.7 MG/DL (ref 0.5–1.4)
DIFFERENTIAL METHOD: ABNORMAL
EOSINOPHIL # BLD AUTO: 0.1 K/UL (ref 0–0.5)
EOSINOPHIL NFR BLD: 0.5 % (ref 0–8)
ERYTHROCYTE [DISTWIDTH] IN BLOOD BY AUTOMATED COUNT: 15.9 % (ref 11.5–14.5)
EST. GFR  (AFRICAN AMERICAN): >60 ML/MIN/1.73 M^2
EST. GFR  (NON AFRICAN AMERICAN): >60 ML/MIN/1.73 M^2
GLUCOSE SERPL-MCNC: 130 MG/DL (ref 70–110)
HCT VFR BLD AUTO: 35.8 % (ref 37–48.5)
HGB BLD-MCNC: 12 G/DL (ref 12–16)
IMM GRANULOCYTES # BLD AUTO: 0.06 K/UL (ref 0–0.04)
IMM GRANULOCYTES NFR BLD AUTO: 0.4 % (ref 0–0.5)
LYMPHOCYTES # BLD AUTO: 2.6 K/UL (ref 1–4.8)
LYMPHOCYTES NFR BLD: 17.9 % (ref 18–48)
MCH RBC QN AUTO: 30.8 PG (ref 27–31)
MCHC RBC AUTO-ENTMCNC: 33.5 G/DL (ref 32–36)
MCV RBC AUTO: 92 FL (ref 82–98)
MONOCYTES # BLD AUTO: 0.8 K/UL (ref 0.3–1)
MONOCYTES NFR BLD: 5.3 % (ref 4–15)
NEUTROPHILS # BLD AUTO: 10.8 K/UL (ref 1.8–7.7)
NEUTROPHILS NFR BLD: 75.7 % (ref 38–73)
NRBC BLD-RTO: 0 /100 WBC
PLATELET # BLD AUTO: 254 K/UL (ref 150–450)
PMV BLD AUTO: 11.7 FL (ref 9.2–12.9)
POTASSIUM SERPL-SCNC: 4 MMOL/L (ref 3.5–5.1)
PROT SERPL-MCNC: 7.3 G/DL (ref 6–8.4)
RBC # BLD AUTO: 3.9 M/UL (ref 4–5.4)
SODIUM SERPL-SCNC: 136 MMOL/L (ref 136–145)
WBC # BLD AUTO: 14.28 K/UL (ref 3.9–12.7)

## 2022-03-23 PROCEDURE — 80053 COMPREHEN METABOLIC PANEL: CPT | Performed by: STUDENT IN AN ORGANIZED HEALTH CARE EDUCATION/TRAINING PROGRAM

## 2022-03-23 PROCEDURE — 3044F HG A1C LEVEL LT 7.0%: CPT | Mod: CPTII,S$GLB,, | Performed by: OBSTETRICS & GYNECOLOGY

## 2022-03-23 PROCEDURE — 1159F MED LIST DOCD IN RCRD: CPT | Mod: CPTII,S$GLB,, | Performed by: OBSTETRICS & GYNECOLOGY

## 2022-03-23 PROCEDURE — 76816 PR  US,PREGNANT UTERUS,F/U,TRANSABD APP: ICD-10-PCS | Mod: S$GLB,,, | Performed by: OBSTETRICS & GYNECOLOGY

## 2022-03-23 PROCEDURE — 99999 PR PBB SHADOW E&M-EST. PATIENT-LVL III: CPT | Mod: PBBFAC,,, | Performed by: OBSTETRICS & GYNECOLOGY

## 2022-03-23 PROCEDURE — 36415 COLL VENOUS BLD VENIPUNCTURE: CPT | Performed by: STUDENT IN AN ORGANIZED HEALTH CARE EDUCATION/TRAINING PROGRAM

## 2022-03-23 PROCEDURE — 3075F PR MOST RECENT SYSTOLIC BLOOD PRESS GE 130-139MM HG: ICD-10-PCS | Mod: CPTII,S$GLB,, | Performed by: OBSTETRICS & GYNECOLOGY

## 2022-03-23 PROCEDURE — 3079F PR MOST RECENT DIASTOLIC BLOOD PRESSURE 80-89 MM HG: ICD-10-PCS | Mod: CPTII,S$GLB,, | Performed by: OBSTETRICS & GYNECOLOGY

## 2022-03-23 PROCEDURE — 3008F BODY MASS INDEX DOCD: CPT | Mod: CPTII,S$GLB,, | Performed by: OBSTETRICS & GYNECOLOGY

## 2022-03-23 PROCEDURE — 3079F DIAST BP 80-89 MM HG: CPT | Mod: CPTII,S$GLB,, | Performed by: OBSTETRICS & GYNECOLOGY

## 2022-03-23 PROCEDURE — 3008F PR BODY MASS INDEX (BMI) DOCUMENTED: ICD-10-PCS | Mod: CPTII,S$GLB,, | Performed by: OBSTETRICS & GYNECOLOGY

## 2022-03-23 PROCEDURE — 3044F PR MOST RECENT HEMOGLOBIN A1C LEVEL <7.0%: ICD-10-PCS | Mod: CPTII,S$GLB,, | Performed by: OBSTETRICS & GYNECOLOGY

## 2022-03-23 PROCEDURE — 1159F PR MEDICATION LIST DOCUMENTED IN MEDICAL RECORD: ICD-10-PCS | Mod: CPTII,S$GLB,, | Performed by: OBSTETRICS & GYNECOLOGY

## 2022-03-23 PROCEDURE — 3075F SYST BP GE 130 - 139MM HG: CPT | Mod: CPTII,S$GLB,, | Performed by: OBSTETRICS & GYNECOLOGY

## 2022-03-23 PROCEDURE — 99417 PR PROLONGED SVC, OUTPT, W/WO DIRECT PT CONTACT,  EA ADDTL 15 MIN: ICD-10-PCS | Mod: S$GLB,,, | Performed by: OBSTETRICS & GYNECOLOGY

## 2022-03-23 PROCEDURE — 85025 COMPLETE CBC W/AUTO DIFF WBC: CPT | Performed by: STUDENT IN AN ORGANIZED HEALTH CARE EDUCATION/TRAINING PROGRAM

## 2022-03-23 PROCEDURE — 99999 PR PBB SHADOW E&M-EST. PATIENT-LVL III: ICD-10-PCS | Mod: PBBFAC,,, | Performed by: OBSTETRICS & GYNECOLOGY

## 2022-03-23 PROCEDURE — 99215 PR OFFICE/OUTPT VISIT, EST, LEVL V, 40-54 MIN: ICD-10-PCS | Mod: 25,S$GLB,, | Performed by: OBSTETRICS & GYNECOLOGY

## 2022-03-23 PROCEDURE — 99417 PROLNG OP E/M EACH 15 MIN: CPT | Mod: S$GLB,,, | Performed by: OBSTETRICS & GYNECOLOGY

## 2022-03-23 PROCEDURE — 99215 OFFICE O/P EST HI 40 MIN: CPT | Mod: 25,S$GLB,, | Performed by: OBSTETRICS & GYNECOLOGY

## 2022-03-23 PROCEDURE — 76816 OB US FOLLOW-UP PER FETUS: CPT | Mod: S$GLB,,, | Performed by: OBSTETRICS & GYNECOLOGY

## 2022-03-23 RX ORDER — ASPIRIN 81 MG/1
81 TABLET ORAL DAILY
COMMUNITY
End: 2022-05-10

## 2022-03-23 RX ORDER — INSULIN LISPRO 100 [IU]/ML
INJECTION, SOLUTION INTRAVENOUS; SUBCUTANEOUS
Qty: 10 ML | Refills: 3 | Status: SHIPPED | OUTPATIENT
Start: 2022-03-23 | End: 2022-03-30

## 2022-03-23 RX ORDER — INSULIN HUMAN 100 [IU]/ML
INJECTION, SUSPENSION SUBCUTANEOUS
Qty: 9 ML | Refills: 0 | Status: SHIPPED | OUTPATIENT
Start: 2022-03-23 | End: 2022-03-30

## 2022-03-23 RX ORDER — GLUCOSAM/CHON-MSM1/C/MANG/BOSW 500-416.6
TABLET ORAL
Status: ON HOLD | COMMUNITY
Start: 2022-01-26 | End: 2022-05-10 | Stop reason: HOSPADM

## 2022-03-23 RX ORDER — SYRINGE,SAFETY WITH NEEDLE,1ML 25GX1"
1 SYRINGE (EA) MISCELLANEOUS 4 TIMES DAILY
Qty: 100 EACH | Refills: 3 | Status: ON HOLD | OUTPATIENT
Start: 2022-03-23 | End: 2022-05-10 | Stop reason: HOSPADM

## 2022-03-23 NOTE — PROGRESS NOTES
MATERNAL-FETAL MEDICINE   CONSULT NOTE    Provider requesting consultation: [place]    SUBJECTIVE:     Ms. Marcie Cheek is a 31 y.o.  female with IUP at 30w0d who is seen in consultation by MFM for evaluation and management of:  Problem   Chronic Hypertension Affecting Pregnancy   Diabetes Mellitus Affecting Pregnancy in Third Trimester   Obesity Affecting Pregnancy in Third Trimester   Elevated Liver Enzymes   Depression Affecting Pregnancy       Patient has no complaints today. She is overall feeling well.  Patient denies any contractions/cramping, vaginal bleeding or leakage of fluid.  She reports good fetal movement.    Patient has been compliant on her Metformin which was started in February and increased a few weeks ago, but BS have remained elevated despite medication.    Blood sugar log was reviewed:           Medication List with Changes/Refills   Current Medications    ASPIRIN (ECOTRIN) 81 MG EC TABLET    Take 81 mg by mouth once daily.    BLOOD SUGAR DIAGNOSTIC STRP    To check BG 4 times daily, to use with insurance preferred meter    BLOOD-GLUCOSE METER KIT    To check BG 4 times daily, to use with insurance preferred meter    ESCITALOPRAM OXALATE (LEXAPRO) 20 MG TABLET    Take 1 tablet (20 mg total) by mouth once daily.    FAMOTIDINE (PEPCID ORAL)    Take by mouth as needed.    LANCETS MISC    To check BG 4 times daily, to use with insurance preferred meter    METFORMIN (GLUCOPHAGE) 500 MG TABLET    Take 2 tablets (1,000 mg total) by mouth daily with dinner or evening meal.    PNV NO.1/IRON,CARB/DOCUS/FOLIC (PREN VIT COMB.1-IRON CB-FA-DSS ORAL)    Take by mouth.    TRUEPLUS LANCETS 30 GAUGE MISC           Review of patient's allergies indicates:  No Known Allergies    PMH:  Past Medical History:   Diagnosis Date    Anxiety     Depression     Diabetes mellitus affecting pregnancy in third trimester 3/14/2022    Hypertension        PObHx:  OB History    Para Term  AB Living   1  "            SAB IAB Ectopic Multiple Live Births                  # Outcome Date GA Lbr Harrison/2nd Weight Sex Delivery Anes PTL Lv   1 Current                PSH:  Past Surgical History:   Procedure Laterality Date    HYSTEROSCOPY      WISDOM TOOTH EXTRACTION         Family history:family history includes Thyroid disease in her maternal grandmother.    Social history: reports that she has never smoked. She has never used smokeless tobacco. She reports previous alcohol use. She reports that she does not use drugs.    Genetic history: The patient denies any inherited genetic diseases or birth defects in herself or her partner's personal history or family.    Objective:   /82 (BP Location: Left arm, Patient Position: Sitting, BP Method: Large (Automatic))   Ht 5' 7" (1.702 m)   Wt 134.5 kg (296 lb 8.3 oz)   LMP 2021   BMI 46.44 kg/m²     Physical Exam  Deferred    Ultrasound performed. See viewpoint for full ultrasound report.  [place] live IUP.  Some of the previous suboptimal views are seen today and appear unremarkable. Some of the anatomic structures remain suboptimally imaged (see above), but no abnormalities are suspected. A follow-up anatomic ultrasound has not been scheduled. Visualization of suboptimally imaged structures is unlikely to improve with advancing gestational age, and no abnormalities have been suspected on prior studies.     Interval fetal growth has been normal (EFW 54%, AC 83%).  MVP is normal.     Significant labs/imaging:  A1c- 4.4 ()  5.8 (10/05/2021)    11/33.6/91/286  AST 57   ALT 57  Creatinine 0.7    ASSESSMENT/PLAN:     31 y.o.  female with IUP at 30w0d     Diabetes mellitus affecting pregnancy in third trimester  Pregestational Diabetes  The patient reports can a diagnosis of prediabetes and ever being told that she had overt diabetes.  However, early in pregnancy she started checking her sugars daily and has since continued given initial elevations that " were seen.  Is not clear what diagnosis she carries since then.  However, given her risk factors, prior diagnosis, and early sugar elevations, I would be highly inclined to call this a pre-gestational diabetic and treat patient as such for this pregnancy.   The patient did make in certain diet changes early in pregnancy which she noticed improved her sugars and her A1c, as evidence per above.  Nonetheless, further elevations in her sugars were noted and metformin was started in February. Dose was increased because of her blood sugar elevations and have not remained under control as evidenced by her BS log above.  The patient was counseled regarding the risks of diabetes in pregnancy. These risks include but are not limited to an increased risk of , preeclampsia/gestational hypertension, fetal structural anomalies, macrosomia, prematurity, shoulder dystocia/birth injury,  hyperbilirubinemia and electrolyte issues, pulmonary immaturity and sudden stillbirth especially in those patients with poor glucose control. I also discussed with the patient the need for increased fetal surveillance with serial fetal growth assessments and third trimester fetal testing. I also reviewed the possibility of need for early delivery in those with poor glucose control or evidence of fetal growth abnormalities.    Recommendations (Please refer to [facility] guidelines):   Baseline evaluation (to be ordered by primary OB provider):  o 24 hour urine protein or urine P/C ratio, CBC, CMP (this was ordered previously and patient will go have drawn soon)  o Maternal EKG; echocardiogram if BMI > 30 or EKG is abnormal  o Maternal ophthalmic exam (if hasn't been performed in last year) and podiatry exam  o Hemoglobin A1C every trimester  o Diabetic education referral and counseling re: goal blood sugars (< 95 mg/dl for fasting, < 120 mg/dl for 2 hour postprandial)   Medications:   o Continue low dose aspirin 81 mg daily at 12-16  weeks for preeclampsia risk reduction  o Insulin - see below   Ultrasounds with MFM:  o Serial growth ultrasounds q 4-6 weeks     A fetal echocardiogram is recommended with pediatric cardiology. This was ordered today.     Prenatal testing  o Twice weekly BPP/ NST + AFV starting at 32 weeks. (Should be ordered and arranged by the patient's primary OB provider).     Delivery timing:  o 37 0/7 to 37 and 67 weeks if longstanding diabetes or poorly controlled, polyhydramnios, EFW>90th percentile, or BMI >= 40 (CURRENTLY)  o 36 0/7 to 37 and 6/7 weeks if vascular complications, prior stillbirth or other complicating conditions.  Recommend consideration of earlier delivery if IUGR, HTN, or other complications  Recommend offering  for delivery is EFW is 4500g or more near the time of delivery     Insulin management during labor and delivery  o Give usual dose of intermediate acting (NPH) or long-acting insulin at bedtime  o Hold morning dose of insulin or reduce to ½ dose   o Patients who require insulin should be scheduled as the first available (7 am or 7:30 am)  given the need for NPO status  o Check glucose every 2-4 hours in latent labor; hourly in active labor  o If blood glucose is less than 70 mg/dl, change IVF to D5 ½ NS at rate of 100-150 cc/hr and monitor blood glucose hourly   o IV infusion of regular insulin is recommended if glucose levels exceed 120 mg/dl     Postpartum management  o Check fasting blood sugar in hospital postpartum and consider checking 4x/day while inpatient.   - May consider stopping therapy if sugars remain well controlled postpartum with monitoring    Postpartum glucose testing in 6-8 weeks postpartum using a 75 g oral glucose tolerance test.  - If fasting blood glucose is between 100-125 mg/dl or impaired glucose tolerance is noted on 2 hour glucose test, refer to primary care as appropriate.   o Patients who are breastfeeding should be advised to have small  snacks before breastfeeding to reduce the risk of hypoglycemia.     Multiple dose injectable insulin  o Patient's current regimen is:  - Metformin 1000mg nightly  o After consultation, we will start the patient on insulin:  - NPH 14/10  - Aspart 10/10  - Stop Metformin per patient preference.  - Please note, based on her weight based dosing, patient should be on NPH 52/20, Aspart 28/20. However, given insuline naivety, I decided to cut her dose to the above regimen. We will remain vigilant and be aggressive on increasing doses as needed once we see good tolerance.    The patient was advised to call for blood sugars less than 70 or greater than 200 prior to her next appointment. She was also advised that if she notes nausea/vomiting, inability to tolerate PO, or concerns about being able to take her insulin that she should contact her provider or present to the OB ED.  Strict hypoglycemic precautions were reviewed, including symptoms that may be associated to being hypoglycemic. Steps reviewed if hypoglycemia occurs: this included having available/calling for help, taking her sugars Q15 minutes while using chocolate/candy/juice if surgars are low, redosing Q15 minutes if needed, calling 911 if persistent hypoglycemia or LOC.        Chronic hypertension affecting pregnancy  Chronic Hypertension  Today I counseled the patient on maternal/fetal risks associated with CHTN during pregnancy. Risks include but not limited to fetal growth restriction, miscarriage, abruption, maternal end organ disease (renal failure, MI, and stroke),  delivery, development of superimposed preeclampsia, and eclampsia. She was counseled on the recommendations for blood pressure control, serial ultrasound for fetal growth assessment and  testing, and timing of delivery. I also counseled her on the recommendation for aspirin 81 mg daily which may decrease her risk of developing superimposed preeclampsia.     Recommendations  (Please refer to [facility] guidelines):  -Continue current medications: ASA  -Baseline evaluation with primary OB per DM problem  -Continued close observation of patient's blood pressures. Avoid hypotension as this has been associated with uteroplacental insufficiency.  -If BP < 160/105 and no evidence of end organ damage, no medication treatment needed   -If BP is persistently ?160/105, antihypertensive medication is recommended with goal BP of 120-160/.  -Delivery timing:  Per T2DM timing and recommendations    Comorbid conditions include BMI >= 40, diabetes, and complex medical condition associated with placental dysfunction (ie lupus or other vascular disease)  Delivery may be recommended earlier pending results of fetal growth ultrasounds, AFV assessment, or antepartum testing results.    Obesity affecting pregnancy in third trimester  Obesity  The patient was counseled on the  risks associated with obesity which include and increased risk of hypertension, preeclampsia, gestational diabetes, venous thromboembolic disease,  delivery, macrosomia (with resultant shoulder dystocia, obstetric sphincter injury), IUFD, longer first stage of labor, emergent & scheduled  & complications of  (prolonged OR time, delayed delivery, excessive EBL, infection, wound separation/infection, higher spinal failure rate, more difficult intubation).  Obesity is also associated with fetal anomalies, specifically neural tube defects.  Studies have shown that the rate of complication increases with rising BMI and thus pregnancies with maternal morbid obesity are at increased risk.      Recommendations:   Discussed that TWG goal is 11-20 lbs   All other recommendations per other comorbidities.   Postpartum lifestyle modifications & weight loss      Elevated liver enzymes  Noted on earlier labs.   We were not officially consulted on this. No repeat have been performed since earlier labs, however  these appeared to be downtrending.  Repeat labs have been ordered.  Management per primary OB provider      Depression affecting pregnancy  Currently on Lexapro.  See psychologist frequently for therapy.  Management per primary OB provider        Patient was counseled that prenatal ultrasound studies have limitations. They do not detect all fetal, genetic, placental, and maternal abnormalities.    Patient's other comorbidites were not addressed in today's visit.  If primary OB provider would like MFM input on these, please feel free to reconsult as clinically indicated.  Otherwise, will defer management to primary OB provider      FOLLOW UP:   A follow up ultrasound was made for 4 weeks from today. A follow up MFM MD visit was made for 1 week from today for BS check.       The patient was given an opportunity to ask questions about the management of her high risk pregnancy problems. She expressed an understanding of and agreement to the above impression and plan. All questions were answered to her satisfaction.    95 minutes of total time spent on the encounter, which includes face to face time and non-face to face time preparing to see the patient (eg, review of tests), obtaining and/or reviewing separately obtained history, documenting clinical information in the electronic or other health record, independently interpreting results (not separately reported) and communicating results to the patient/family/caregiver, or care coordination (not separately reported).        Montez Kim MD   Maternal-Fetal Medicine      Electronically Signed by Montez Kim March 23, 2022

## 2022-03-23 NOTE — ASSESSMENT & PLAN NOTE
Currently on Lexapro.  See psychologist frequently for therapy.  Management per primary OB provider

## 2022-03-23 NOTE — ASSESSMENT & PLAN NOTE
Noted on earlier labs.   We were not officially consulted on this. No repeat have been performed since earlier labs, however these appeared to be downtrending.  Repeat labs have been ordered.  Management per primary OB provider

## 2022-03-23 NOTE — ASSESSMENT & PLAN NOTE
Chronic Hypertension  Today I counseled the patient on maternal/fetal risks associated with CHTN during pregnancy. Risks include but not limited to fetal growth restriction, miscarriage, abruption, maternal end organ disease (renal failure, MI, and stroke),  delivery, development of superimposed preeclampsia, and eclampsia. She was counseled on the recommendations for blood pressure control, serial ultrasound for fetal growth assessment and  testing, and timing of delivery. I also counseled her on the recommendation for aspirin 81 mg daily which may decrease her risk of developing superimposed preeclampsia.     Recommendations (Please refer to [facility] guidelines):  -Continue current medications: ASA  -Baseline evaluation with primary OB per DM problem  -Continued close observation of patient's blood pressures. Avoid hypotension as this has been associated with uteroplacental insufficiency.  -If BP < 160/105 and no evidence of end organ damage, no medication treatment needed   -If BP is persistently ?160/105, antihypertensive medication is recommended with goal BP of 120-160/.  -Delivery timing:  Per T2DM timing and recommendations    Comorbid conditions include BMI >= 40, diabetes, and complex medical condition associated with placental dysfunction (ie lupus or other vascular disease)  Delivery may be recommended earlier pending results of fetal growth ultrasounds, AFV assessment, or antepartum testing results.

## 2022-03-23 NOTE — ASSESSMENT & PLAN NOTE
Pregestational Diabetes  The patient reports can a diagnosis of prediabetes and ever being told that she had overt diabetes.  However, early in pregnancy she started checking her sugars daily and has since continued given initial elevations that were seen.  Is not clear what diagnosis she carries since then.  However, given her risk factors, prior diagnosis, and early sugar elevations, I would be highly inclined to call this a pre-gestational diabetic and treat patient as such for this pregnancy.   The patient did make in certain diet changes early in pregnancy which she noticed improved her sugars and her A1c, as evidence per above.  Nonetheless, further elevations in her sugars were noted and metformin was started in February. Dose was increased because of her blood sugar elevations and have not remained under control as evidenced by her BS log above.  The patient was counseled regarding the risks of diabetes in pregnancy. These risks include but are not limited to an increased risk of , preeclampsia/gestational hypertension, fetal structural anomalies, macrosomia, prematurity, shoulder dystocia/birth injury,  hyperbilirubinemia and electrolyte issues, pulmonary immaturity and sudden stillbirth especially in those patients with poor glucose control. I also discussed with the patient the need for increased fetal surveillance with serial fetal growth assessments and third trimester fetal testing. I also reviewed the possibility of need for early delivery in those with poor glucose control or evidence of fetal growth abnormalities.    Recommendations (Please refer to [facility] guidelines):   Baseline evaluation (to be ordered by primary OB provider):  o 24 hour urine protein or urine P/C ratio, CBC, CMP (this was ordered previously and patient will go have drawn soon)  o Maternal EKG; echocardiogram if BMI > 30 or EKG is abnormal  o Maternal ophthalmic exam (if hasn't been performed in last year)  and podiatry exam  o Hemoglobin A1C every trimester  o Diabetic education referral and counseling re: goal blood sugars (< 95 mg/dl for fasting, < 120 mg/dl for 2 hour postprandial)   Medications:   o Continue low dose aspirin 81 mg daily at 12-16 weeks for preeclampsia risk reduction  o Insulin - see below   Ultrasounds with MFM:  o Serial growth ultrasounds q 4-6 weeks     A fetal echocardiogram is recommended with pediatric cardiology. This was ordered today.     Prenatal testing  o Twice weekly BPP/ NST + AFV starting at 32 weeks. (Should be ordered and arranged by the patient's primary OB provider).     Delivery timing:  o 37 0/7 to 37 and 67 weeks if longstanding diabetes or poorly controlled, polyhydramnios, EFW>90th percentile, or BMI >= 40 (CURRENTLY)  o 36 0/7 to 37 and 6/7 weeks if vascular complications, prior stillbirth or other complicating conditions.  Recommend consideration of earlier delivery if IUGR, HTN, or other complications  Recommend offering  for delivery is EFW is 4500g or more near the time of delivery     Insulin management during labor and delivery  o Give usual dose of intermediate acting (NPH) or long-acting insulin at bedtime  o Hold morning dose of insulin or reduce to ½ dose   o Patients who require insulin should be scheduled as the first available (7 am or 7:30 am)  given the need for NPO status  o Check glucose every 2-4 hours in latent labor; hourly in active labor  o If blood glucose is less than 70 mg/dl, change IVF to D5 ½ NS at rate of 100-150 cc/hr and monitor blood glucose hourly   o IV infusion of regular insulin is recommended if glucose levels exceed 120 mg/dl     Postpartum management  o Check fasting blood sugar in hospital postpartum and consider checking 4x/day while inpatient.   - May consider stopping therapy if sugars remain well controlled postpartum with monitoring    Postpartum glucose testing in 6-8 weeks postpartum using a 75 g oral  glucose tolerance test.  - If fasting blood glucose is between 100-125 mg/dl or impaired glucose tolerance is noted on 2 hour glucose test, refer to primary care as appropriate.   o Patients who are breastfeeding should be advised to have small snacks before breastfeeding to reduce the risk of hypoglycemia.     Multiple dose injectable insulin  o Patient's current regimen is:  - Metformin 1000mg nightly  o After consultation, we will start the patient on insulin:  - NPH 14/10  - Aspart 10/10  - Stop Metformin per patient preference.  - Please note, based on her weight based dosing, patient should be on NPH 52/20, Aspart 28/20. However, given insuline naivety, I decided to cut her dose to the above regimen. We will remain vigilant and be aggressive on increasing doses as needed once we see good tolerance.    The patient was advised to call for blood sugars less than 70 or greater than 200 prior to her next appointment. She was also advised that if she notes nausea/vomiting, inability to tolerate PO, or concerns about being able to take her insulin that she should contact her provider or present to the OB ED.  Strict hypoglycemic precautions were reviewed, including symptoms that may be associated to being hypoglycemic. Steps reviewed if hypoglycemia occurs: this included having available/calling for help, taking her sugars Q15 minutes while using chocolate/candy/juice if surgars are low, redosing Q15 minutes if needed, calling 911 if persistent hypoglycemia or LOC.

## 2022-03-23 NOTE — ASSESSMENT & PLAN NOTE
Obesity  The patient was counseled on the  risks associated with obesity which include and increased risk of hypertension, preeclampsia, gestational diabetes, venous thromboembolic disease,  delivery, macrosomia (with resultant shoulder dystocia, obstetric sphincter injury), IUFD, longer first stage of labor, emergent & scheduled  & complications of  (prolonged OR time, delayed delivery, excessive EBL, infection, wound separation/infection, higher spinal failure rate, more difficult intubation).  Obesity is also associated with fetal anomalies, specifically neural tube defects.  Studies have shown that the rate of complication increases with rising BMI and thus pregnancies with maternal morbid obesity are at increased risk.      Recommendations:   Discussed that TWG goal is 11-20 lbs   All other recommendations per other comorbidities.   Postpartum lifestyle modifications & weight loss

## 2022-03-24 ENCOUNTER — PATIENT MESSAGE (OUTPATIENT)
Dept: OBSTETRICS AND GYNECOLOGY | Facility: CLINIC | Age: 32
End: 2022-03-24
Payer: COMMERCIAL

## 2022-03-24 ENCOUNTER — IMMUNIZATION (OUTPATIENT)
Dept: PHARMACY | Facility: CLINIC | Age: 32
End: 2022-03-24
Payer: COMMERCIAL

## 2022-03-24 ENCOUNTER — TELEPHONE (OUTPATIENT)
Dept: MATERNAL FETAL MEDICINE | Facility: CLINIC | Age: 32
End: 2022-03-24
Payer: COMMERCIAL

## 2022-03-28 ENCOUNTER — OFFICE VISIT (OUTPATIENT)
Dept: PEDIATRIC CARDIOLOGY | Facility: CLINIC | Age: 32
End: 2022-03-28
Attending: PEDIATRICS
Payer: COMMERCIAL

## 2022-03-28 ENCOUNTER — CLINICAL SUPPORT (OUTPATIENT)
Dept: PEDIATRIC CARDIOLOGY | Facility: CLINIC | Age: 32
End: 2022-03-28
Payer: COMMERCIAL

## 2022-03-28 VITALS
WEIGHT: 293 LBS | SYSTOLIC BLOOD PRESSURE: 120 MMHG | HEART RATE: 103 BPM | HEIGHT: 67 IN | BODY MASS INDEX: 45.99 KG/M2 | DIASTOLIC BLOOD PRESSURE: 83 MMHG

## 2022-03-28 DIAGNOSIS — O24.913 DIABETES MELLITUS AFFECTING PREGNANCY IN THIRD TRIMESTER: ICD-10-CM

## 2022-03-28 DIAGNOSIS — O99.213 OBESITY AFFECTING PREGNANCY IN THIRD TRIMESTER: ICD-10-CM

## 2022-03-28 DIAGNOSIS — O24.913 DIABETES MELLITUS AFFECTING PREGNANCY IN THIRD TRIMESTER: Primary | ICD-10-CM

## 2022-03-28 DIAGNOSIS — O10.919 CHRONIC HYPERTENSION AFFECTING PREGNANCY: ICD-10-CM

## 2022-03-28 PROCEDURE — 93325 DOPPLER ECHO COLOR FLOW MAPG: CPT | Mod: S$GLB,,, | Performed by: PEDIATRICS

## 2022-03-28 PROCEDURE — 76827 PR  SO2 FETAL HEART DOPPLER: ICD-10-PCS | Mod: S$GLB,,, | Performed by: PEDIATRICS

## 2022-03-28 PROCEDURE — 76825 PR  SO2 FETAL HEART: ICD-10-PCS | Mod: S$GLB,,, | Performed by: PEDIATRICS

## 2022-03-28 PROCEDURE — 99999 PR PBB SHADOW E&M-EST. PATIENT-LVL I: CPT | Mod: PBBFAC,,,

## 2022-03-28 PROCEDURE — 99999 PR PBB SHADOW E&M-EST. PATIENT-LVL III: ICD-10-PCS | Mod: PBBFAC,,, | Performed by: PEDIATRICS

## 2022-03-28 PROCEDURE — 99203 OFFICE O/P NEW LOW 30 MIN: CPT | Mod: 25,S$GLB,, | Performed by: PEDIATRICS

## 2022-03-28 PROCEDURE — 99999 PR PBB SHADOW E&M-EST. PATIENT-LVL I: ICD-10-PCS | Mod: PBBFAC,,,

## 2022-03-28 PROCEDURE — 99203 PR OFFICE/OUTPT VISIT, NEW, LEVL III, 30-44 MIN: ICD-10-PCS | Mod: 25,S$GLB,, | Performed by: PEDIATRICS

## 2022-03-28 PROCEDURE — 99999 PR PBB SHADOW E&M-EST. PATIENT-LVL III: CPT | Mod: PBBFAC,,, | Performed by: PEDIATRICS

## 2022-03-28 PROCEDURE — 76827 ECHO EXAM OF FETAL HEART: CPT | Mod: S$GLB,,, | Performed by: PEDIATRICS

## 2022-03-28 PROCEDURE — 93325 PR DOPPLER COLOR FLOW VELOCITY MAP: ICD-10-PCS | Mod: S$GLB,,, | Performed by: PEDIATRICS

## 2022-03-28 PROCEDURE — 76825 ECHO EXAM OF FETAL HEART: CPT | Mod: S$GLB,,, | Performed by: PEDIATRICS

## 2022-03-28 NOTE — PROGRESS NOTES
"Ms. Cheek  is a 31 y.o. year old  , referred by Dr. Kim because of the history of diabetes mellitus and chronic hypertension.    The patient presented at approximately 30 5/7 weeks gestation (Patient's last menstrual period was 2021.).  The patient denied any complaints.    Past medical history: Significant for diabetes mellitus, chronic hypertension and obesity.  Past surgical history: S/P wisdom tooth extraction and hysteroscopy.  Past gestational history: N/A    Family history: Negative for congenital heart disease, and sudden death during childhood.    Medications:   Outpatient Encounter Medications as of 3/28/2022   Medication Sig Dispense Refill    aspirin (ECOTRIN) 81 MG EC tablet Take 81 mg by mouth once daily.      blood sugar diagnostic Strp To check BG 4 times daily, to use with insurance preferred meter 100 each 2    blood-glucose meter kit To check BG 4 times daily, to use with insurance preferred meter 1 each 0    EScitalopram oxalate (LEXAPRO) 20 MG tablet Take 1 tablet (20 mg total) by mouth once daily. 30 tablet 2    insulin lispro (HUMALOG U-100 INSULIN) 100 unit/mL injection Inject 10 Units into the skin before breakfast AND 10 Units before dinner. 10 mL 3    insulin NPH (HUMULIN N NPH U-100 INSULIN) 100 unit/mL injection Inject 14 Units into the skin before breakfast AND 10 Units every evening. 9 mL 0    insulin syringe-needle U-100 1 mL 29 gauge x 1/2" Syrg 1 Syringe by Misc.(Non-Drug; Combo Route) route 4 (four) times daily. 100 each 3    lancets Misc To check BG 4 times daily, to use with insurance preferred meter 100 each 2    PNV no.1/iron,carb/docus/folic (PREN VIT COMB.1-IRON CB-FA-DSS ORAL) Take by mouth.      TRUEPLUS LANCETS 30 gauge Misc       famotidine (PEPCID ORAL) Take by mouth as needed.       No facility-administered encounter medications on file as of 3/28/2022.       Allergies: Patient has no known allergies.    Blood pressure 120/83, pulse 103, " "height 5' 7.01" (1.702 m), weight 134.6 kg (296 lb 11.8 oz), last menstrual period 08/25/2021.    Fetal echocardiogram was technically difficult due to patient size and fetal position.  A four chamber fetal heart with situs solitus was seen.  The ventricles appeared to be equal in size.  The contractility of both ventricles was good.  The fetal heart rate was within the normal range, and regular.  The interventricular septum appeared to be intact.  There were normally related great arteries seen.  The ductal and aortic arch were visualized, and appeared to be widely patent.  There was no pleural or pericardial effusion seen.    Doppler analysis revealed a three vessel umbilical cord, with normal flow patterns, and velocities by Doppler.  There was a normal flow pattern seen in the ductus venosus.  There was evidence of normal systemic, and pulmonary venous return seen.  There were normal inflow patterns seen across the AV-valves, without significant insufficiency.  There was no ventricular level shunt seen.  The right and left ventricular outflow tract, and ductal and aortic arch appeared to be unobstructed.    Impression:  Although this was a technically difficult study, it is our impression that Ms. Cheek had a normal fetal echocardiogram.  As you know, small defects, and coarctation of the aorta cannot always be ruled out on fetal echocardiogram.  We discussed our findings with the patient and her partner, reviewed our images, and answered her questions. We also discussed the limitations of fetal echocardiography, but emphasized that her child appears to have normal cardiac anatomy.  No further follow up is scheduled in our clinic, but, of course, we will always be available to reevaluate this patient, if needed.    The above information was discussed in detail including the use of diagrams, with 30 minutes of total face to face time, with greater than 50% with counseling and coordination of care.  The " discussion of the diagnosis and treatment options is as described above.      Time spent: 30 minutes, 50% dedicated to counseling.

## 2022-03-29 ENCOUNTER — PATIENT MESSAGE (OUTPATIENT)
Dept: MATERNAL FETAL MEDICINE | Facility: CLINIC | Age: 32
End: 2022-03-29
Payer: COMMERCIAL

## 2022-03-30 ENCOUNTER — PATIENT MESSAGE (OUTPATIENT)
Dept: OBSTETRICS AND GYNECOLOGY | Facility: CLINIC | Age: 32
End: 2022-03-30
Payer: COMMERCIAL

## 2022-03-30 ENCOUNTER — OFFICE VISIT (OUTPATIENT)
Dept: MATERNAL FETAL MEDICINE | Facility: CLINIC | Age: 32
End: 2022-03-30
Payer: COMMERCIAL

## 2022-03-30 VITALS
SYSTOLIC BLOOD PRESSURE: 131 MMHG | HEIGHT: 67 IN | BODY MASS INDEX: 45.99 KG/M2 | WEIGHT: 293 LBS | DIASTOLIC BLOOD PRESSURE: 81 MMHG

## 2022-03-30 DIAGNOSIS — R74.8 ELEVATED LIVER ENZYMES: ICD-10-CM

## 2022-03-30 DIAGNOSIS — O99.213 OBESITY AFFECTING PREGNANCY IN THIRD TRIMESTER: ICD-10-CM

## 2022-03-30 DIAGNOSIS — O10.919 CHRONIC HYPERTENSION AFFECTING PREGNANCY: ICD-10-CM

## 2022-03-30 DIAGNOSIS — O24.913 DIABETES MELLITUS AFFECTING PREGNANCY IN THIRD TRIMESTER: ICD-10-CM

## 2022-03-30 PROCEDURE — 1159F PR MEDICATION LIST DOCUMENTED IN MEDICAL RECORD: ICD-10-PCS | Mod: CPTII,S$GLB,, | Performed by: OBSTETRICS & GYNECOLOGY

## 2022-03-30 PROCEDURE — 3044F HG A1C LEVEL LT 7.0%: CPT | Mod: CPTII,S$GLB,, | Performed by: OBSTETRICS & GYNECOLOGY

## 2022-03-30 PROCEDURE — 1159F MED LIST DOCD IN RCRD: CPT | Mod: CPTII,S$GLB,, | Performed by: OBSTETRICS & GYNECOLOGY

## 2022-03-30 PROCEDURE — 99999 PR PBB SHADOW E&M-EST. PATIENT-LVL III: ICD-10-PCS | Mod: PBBFAC,,, | Performed by: OBSTETRICS & GYNECOLOGY

## 2022-03-30 PROCEDURE — 3079F DIAST BP 80-89 MM HG: CPT | Mod: CPTII,S$GLB,, | Performed by: OBSTETRICS & GYNECOLOGY

## 2022-03-30 PROCEDURE — 3079F PR MOST RECENT DIASTOLIC BLOOD PRESSURE 80-89 MM HG: ICD-10-PCS | Mod: CPTII,S$GLB,, | Performed by: OBSTETRICS & GYNECOLOGY

## 2022-03-30 PROCEDURE — 3075F SYST BP GE 130 - 139MM HG: CPT | Mod: CPTII,S$GLB,, | Performed by: OBSTETRICS & GYNECOLOGY

## 2022-03-30 PROCEDURE — 3075F PR MOST RECENT SYSTOLIC BLOOD PRESS GE 130-139MM HG: ICD-10-PCS | Mod: CPTII,S$GLB,, | Performed by: OBSTETRICS & GYNECOLOGY

## 2022-03-30 PROCEDURE — 99214 PR OFFICE/OUTPT VISIT, EST, LEVL IV, 30-39 MIN: ICD-10-PCS | Mod: S$GLB,,, | Performed by: OBSTETRICS & GYNECOLOGY

## 2022-03-30 PROCEDURE — 3044F PR MOST RECENT HEMOGLOBIN A1C LEVEL <7.0%: ICD-10-PCS | Mod: CPTII,S$GLB,, | Performed by: OBSTETRICS & GYNECOLOGY

## 2022-03-30 PROCEDURE — 99999 PR PBB SHADOW E&M-EST. PATIENT-LVL III: CPT | Mod: PBBFAC,,, | Performed by: OBSTETRICS & GYNECOLOGY

## 2022-03-30 PROCEDURE — 3008F PR BODY MASS INDEX (BMI) DOCUMENTED: ICD-10-PCS | Mod: CPTII,S$GLB,, | Performed by: OBSTETRICS & GYNECOLOGY

## 2022-03-30 PROCEDURE — 99214 OFFICE O/P EST MOD 30 MIN: CPT | Mod: S$GLB,,, | Performed by: OBSTETRICS & GYNECOLOGY

## 2022-03-30 PROCEDURE — 3008F BODY MASS INDEX DOCD: CPT | Mod: CPTII,S$GLB,, | Performed by: OBSTETRICS & GYNECOLOGY

## 2022-03-30 RX ORDER — INSULIN LISPRO 100 [IU]/ML
INJECTION, SOLUTION INTRAVENOUS; SUBCUTANEOUS
Qty: 10 ML | Refills: 3 | Status: ON HOLD | OUTPATIENT
Start: 2022-03-30 | End: 2022-04-09 | Stop reason: SDUPTHER

## 2022-03-30 RX ORDER — INSULIN HUMAN 100 [IU]/ML
INJECTION, SUSPENSION SUBCUTANEOUS
Qty: 10 ML | Refills: 2 | Status: ON HOLD | OUTPATIENT
Start: 2022-03-30 | End: 2022-04-09 | Stop reason: SDUPTHER

## 2022-03-30 NOTE — PROGRESS NOTES
"Maternal Fetal Medicine follow up consult    SUBJECTIVE:     Marcie Cheek is a 31 y.o.  female with IUP at 31w0d who is seen in follow up consultation by MFM.  Pregnancy complications include:   Problem   Chronic Hypertension Affecting Pregnancy   Diabetes Mellitus Affecting Pregnancy in Third Trimester   Obesity Affecting Pregnancy in Third Trimester   Elevated Liver Enzymes       Previous notes reviewed.   No changes to medical, surgical, family, social, or obstetric history.    Interval history since last MFM visit:   Patient has no complaints today. She is overall feeling well.  Patient denies any contractions/cramping, vaginal bleeding or leakage of fluid.  She reports good fetal movement.  Patient reports good tolerance of insulin and feeling better now that she is on it. Denies any lows    Blood sugar log was reviewed:        Medications:  Current Outpatient Medications   Medication Instructions    aspirin (ECOTRIN) 81 mg, Oral, Daily    blood sugar diagnostic Strp To check BG 4 times daily, to use with insurance preferred meter    blood-glucose meter kit To check BG 4 times daily, to use with insurance preferred meter    EScitalopram oxalate (LEXAPRO) 20 mg, Oral, Daily    famotidine (PEPCID ORAL) Oral, As needed (PRN)    insulin lispro (HUMALOG U-100 INSULIN) 100 unit/mL injection Inject 10 Units into the skin before breakfast AND 14 Units before dinner.    insulin NPH (HUMULIN N NPH U-100 INSULIN) 100 unit/mL injection Inject 14 Units into the skin before breakfast AND 14 Units every evening.    insulin syringe-needle U-100 1 mL 29 gauge x 1/2" Syrg 1 Syringe, Misc.(Non-Drug; Combo Route), 4 times daily    lancets Misc To check BG 4 times daily, to use with insurance preferred meter    PNV no.1/iron,carb/docus/folic (PREN VIT COMB.1-IRON CB-FA-DSS ORAL) Oral    TRUEPLUS LANCETS 30 gauge Misc No dose, route, or frequency recorded.       Care team members:  [facility] - Primary OB   " "  OBJECTIVE:   /81 (BP Location: Right arm)   Ht 5' 7" (1.702 m)   Wt 133.7 kg (294 lb 12.1 oz)   LMP 2021   BMI 46.17 kg/m²     Physical Exam  Deferred    FHts confirmed at 140s    Significant labs/imaging:  Lab Results   Component Value Date    ALT 13 2022    AST 16 2022    ALKPHOS 98 2022    BILITOT 0.4 2022     ASSESSMENT/PLAN:     31 y.o.  female with IUP at 31w0d    Diabetes mellitus affecting pregnancy in third trimester  Overall patient is doing much better on her current insulin regimen.    Comorbidities: CHTN/Obesity  Prepregnancy regimen:           Metformin  Baseline HgA1c: 5.6  Last A1c: 4.4 ()  Current regimen (as of 2022):              NPH 14/10   Lispro 10/x/10  FETAL ECHO - 3/29 - [facility] - WNL  MATERNAL ECHO - pending  Dietary counseling - declined    Blood sugar review:  - per above    Recommendations:   Given sugars values above (elevated fastings and dinner) we will adjust her regimen:  o NPH 14/14  o Lispro 10/x/14   Hypoglycemia precautions were once again reviewed.   Continue to check BS 4x daily   Send BS log weekly through portal.    F/U check in 3 weeks.     Delivery timing:  o 37 0/7 to 37 and 67 weeks if longstanding diabetes or poorly controlled, polyhydramnios, EFW>90th percentile, or BMI >= 40 (CURRENTLY)  o 36 0/7 to 37 and 6/7 weeks if vascular complications, prior stillbirth or other complicating conditions.  Recommend consideration of earlier delivery if IUGR, HTN, or other complications  Recommend offering  for delivery is EFW is 4500g or more near the time of delivery    Chronic hypertension affecting pregnancy  Please see initial consult for full recommendations  No Pre-E symptoms today. Continue ASA. Reviewed precautions  Management per primary OB provider      Obesity affecting pregnancy in third trimester  Management per primary OB provider      Elevated liver enzymes  Most recent transaminases were normal " .  No further follow up recommendations at this time.      FOLLOW UP:   A follow up ultrasound was made for 3 weeks from today. A follow up MFM MD visit was made for same day.       The patient was given an opportunity to ask questions about the management of her high risk pregnancy problems. She expressed an understanding of and agreement to the above impression and plan. All questions were answered to her satisfaction.    30 minutes of total time spent on the encounter, which includes face to face time and non-face to face time preparing to see the patient (eg, review of tests), obtaining and/or reviewing separately obtained history, documenting clinical information in the electronic or other health record, independently interpreting results (not separately reported) and communicating results to the patient/family/caregiver, or care coordination (not separately reported).        Montez Kim MD   Maternal-Fetal Medicine      Electronically Signed by Montez Kim March 30, 2022

## 2022-03-30 NOTE — ASSESSMENT & PLAN NOTE
Please see initial consult for full recommendations  No Pre-E symptoms today. Continue ASA. Reviewed precautions  Management per primary OB provider

## 2022-03-30 NOTE — ASSESSMENT & PLAN NOTE
Overall patient is doing much better on her current insulin regimen.    Comorbidities: CHTN/Obesity  Prepregnancy regimen:           Metformin  Baseline HgA1c: 5.6  Last A1c: 4.4 ()  Current regimen (as of 2022):              NPH 14/10   Lispro 10/x/10  FETAL ECHO - 3/29 - [facility] - WNL  MATERNAL ECHO - pending  Dietary counseling - declined    Blood sugar review:  - per above    Recommendations:   Given sugars values above (elevated fastings and dinner) we will adjust her regimen:  o NPH   o Lispro 10/x14   Hypoglycemia precautions were once again reviewed.   Continue to check BS 4x daily   Send BS log weekly through portal.    F/U check in 3 weeks.     Delivery timing:  o 37 0/7 to 37 and 67 weeks if longstanding diabetes or poorly controlled, polyhydramnios, EFW>90th percentile, or BMI >= 40 (CURRENTLY)  o 36 0/7 to 37 and 6/7 weeks if vascular complications, prior stillbirth or other complicating conditions.  Recommend consideration of earlier delivery if IUGR, HTN, or other complications  Recommend offering  for delivery is EFW is 4500g or more near the time of delivery

## 2022-03-31 ENCOUNTER — OFFICE VISIT (OUTPATIENT)
Dept: PSYCHIATRY | Facility: CLINIC | Age: 32
End: 2022-03-31
Payer: COMMERCIAL

## 2022-03-31 DIAGNOSIS — F41.1 GAD (GENERALIZED ANXIETY DISORDER): Primary | ICD-10-CM

## 2022-03-31 DIAGNOSIS — F32.A DEPRESSION AFFECTING PREGNANCY: ICD-10-CM

## 2022-03-31 DIAGNOSIS — O99.340 DEPRESSION AFFECTING PREGNANCY: ICD-10-CM

## 2022-03-31 PROCEDURE — 3044F PR MOST RECENT HEMOGLOBIN A1C LEVEL <7.0%: ICD-10-PCS | Mod: CPTII,95,, | Performed by: PSYCHOLOGIST

## 2022-03-31 PROCEDURE — 3044F HG A1C LEVEL LT 7.0%: CPT | Mod: CPTII,95,, | Performed by: PSYCHOLOGIST

## 2022-03-31 PROCEDURE — 90832 PSYTX W PT 30 MINUTES: CPT | Mod: 95,,, | Performed by: PSYCHOLOGIST

## 2022-03-31 PROCEDURE — 90832 PR PSYCHOTHERAPY W/PATIENT, 30 MIN: ICD-10-PCS | Mod: 95,,, | Performed by: PSYCHOLOGIST

## 2022-03-31 NOTE — PROGRESS NOTES
Individual Psychotherapy (PhD/LCSW)    3/31/2022    The patient location is: Patient's home in [place], [place].  The chief complaint leading to consultation is: anxiety, depression  Visit type: audiovisual  Face to Face time with patient: 30 minutes of total time spent on the encounter, which includes face to face time and non-face to face time preparing to see the patient (eg, review of tests), Obtaining and/or reviewing separately obtained history, Documenting clinical information in the electronic or other health record, Independently interpreting results (not separately reported) and communicating results to the patient/family/caregiver, or Care coordination (not separately reported).   Each patient to whom he or she provides medical services by telemedicine is:  (1) informed of the relationship between the physician and patient and the respective role of any other health care provider with respect to management of the patient; and (2) notified that he or she may decline to receive medical services by telemedicine and may withdraw from such care at any time.       Therapeutic Intervention: Met with patient.  Outpatient - Insight oriented psychotherapy 30 min - CPT code 51729 and Outpatient - Behavior modifying psychotherapy 30 min - CPT code 15066    Chief complaint/reason for encounter: depression and anxiety     Interval history and content of current session: Patient was timely for her individual therapy session via video visit. She reported she has been feeling good in terms of her mood. She is physically exhausted and in pain, and she is ready to have the baby. She is now considered high risk due to gestational diabetes and hypertension; therefore, she has many appointments to attend, which she noted is difficult with one car and getting off of work. She shared she has agreed to take 6 weeks of [place] maternity leave, but she wanted go back to work right away. We discussed why this time off is important for her  physically and mentally. Lastly, we discussed her frustrations with her boss and how she is handling the situation.     Treatment plan:  · Target symptoms: depression, anxiety   · Why chosen therapy is appropriate versus another modality: relevant to diagnosis, evidence based practice  · Outcome monitoring methods: self-report, observation  · Therapeutic intervention type: insight oriented psychotherapy, behavior modifying psychotherapy    Risk parameters:  Patient reports no suicidal ideation  Patient reports no homicidal ideation  Patient reports no self-injurious behavior  Patient reports no violent behavior    Verbal deficits: None    Patient's response to intervention:  The patient's response to intervention is accepting.    Progress toward goals and other mental status changes:  The patient's progress toward goals is fair .    Mental Status Exam:  General Appearance:  unremarkable, age appropriate   Speech: normal tone, normal rate, normal pitch, normal volume      Level of Cooperation: cooperative      Thought Processes: normal and logical   Mood: euthymic      Thought Content: normal, no suicidality, no homicidality, delusions, or paranoia   Affect: congruent and appropriate   Orientation: Oriented x3   Attention Span & Concentration: intact   Judgment & Insight: fair     Language  intact     Diagnosis:     ICD-10-CM ICD-9-CM   1. CANDACE (generalized anxiety disorder)  F41.1 300.02   2. Depression affecting pregnancy  O99.340 648.40    F32.A 311     Plan:  individual psychotherapy and medication management by physician    Return to clinic: 3 weeks    Length of Service (minutes): 30

## 2022-04-01 ENCOUNTER — PATIENT MESSAGE (OUTPATIENT)
Dept: OBSTETRICS AND GYNECOLOGY | Facility: CLINIC | Age: 32
End: 2022-04-01
Payer: COMMERCIAL

## 2022-04-04 ENCOUNTER — HOSPITAL ENCOUNTER (OUTPATIENT)
Dept: CARDIOLOGY | Facility: HOSPITAL | Age: 32
Discharge: HOME | DRG: 832 | End: 2022-04-04
Attending: STUDENT IN AN ORGANIZED HEALTH CARE EDUCATION/TRAINING PROGRAM
Payer: COMMERCIAL

## 2022-04-04 ENCOUNTER — HOSPITAL ENCOUNTER (OUTPATIENT)
Dept: PERINATAL CARE | Facility: OTHER | Age: 32
Discharge: HOME | DRG: 832 | End: 2022-04-04
Attending: STUDENT IN AN ORGANIZED HEALTH CARE EDUCATION/TRAINING PROGRAM
Payer: COMMERCIAL

## 2022-04-04 VITALS
DIASTOLIC BLOOD PRESSURE: 80 MMHG | SYSTOLIC BLOOD PRESSURE: 130 MMHG | HEIGHT: 67 IN | HEART RATE: 92 BPM | BODY MASS INDEX: 45.99 KG/M2 | WEIGHT: 293 LBS

## 2022-04-04 DIAGNOSIS — O99.213 OBESITY AFFECTING PREGNANCY IN THIRD TRIMESTER: ICD-10-CM

## 2022-04-04 DIAGNOSIS — O24.913 DIABETES MELLITUS AFFECTING PREGNANCY IN THIRD TRIMESTER: ICD-10-CM

## 2022-04-04 DIAGNOSIS — O10.919 CHRONIC HYPERTENSION AFFECTING PREGNANCY: ICD-10-CM

## 2022-04-04 LAB
ASCENDING AORTA: 3.11 CM
AV INDEX (PROSTH): 0.74
AV MEAN GRADIENT: 4 MMHG
AV PEAK GRADIENT: 7 MMHG
AV VALVE AREA: 2.75 CM2
AV VELOCITY RATIO: 0.72
BSA FOR ECHO PROCEDURE: 2.51 M2
CV ECHO LV RWT: 0.34 CM
DOP CALC AO PEAK VEL: 1.35 M/S
DOP CALC AO VTI: 21.93 CM
DOP CALC LVOT AREA: 3.7 CM2
DOP CALC LVOT DIAMETER: 2.17 CM
DOP CALC LVOT PEAK VEL: 0.97 M/S
DOP CALC LVOT STROKE VOLUME: 60.22 CM3
DOP CALCLVOT PEAK VEL VTI: 16.29 CM
E WAVE DECELERATION TIME: 187.29 MSEC
E/A RATIO: 0.91
E/E' RATIO: 7.79 M/S
ECHO LV POSTERIOR WALL: 0.78 CM (ref 0.6–1.1)
EJECTION FRACTION: 60 %
FRACTIONAL SHORTENING: 32 % (ref 28–44)
INTERVENTRICULAR SEPTUM: 0.81 CM (ref 0.6–1.1)
IVRT: 74.22 MSEC
LA MAJOR: 5.36 CM
LA MINOR: 5.58 CM
LA WIDTH: 4.27 CM
LEFT ATRIUM SIZE: 4.84 CM
LEFT ATRIUM VOLUME INDEX MOD: 31.3 ML/M2
LEFT ATRIUM VOLUME INDEX: 40.4 ML/M2
LEFT ATRIUM VOLUME MOD: 74.5 CM3
LEFT ATRIUM VOLUME: 96.05 CM3
LEFT INTERNAL DIMENSION IN SYSTOLE: 3.12 CM (ref 2.1–4)
LEFT VENTRICLE DIASTOLIC VOLUME INDEX: 41.33 ML/M2
LEFT VENTRICLE DIASTOLIC VOLUME: 98.37 ML
LEFT VENTRICLE MASS INDEX: 49 G/M2
LEFT VENTRICLE SYSTOLIC VOLUME INDEX: 16.2 ML/M2
LEFT VENTRICLE SYSTOLIC VOLUME: 38.62 ML
LEFT VENTRICULAR INTERNAL DIMENSION IN DIASTOLE: 4.62 CM (ref 3.5–6)
LEFT VENTRICULAR MASS: 117.81 G
LV LATERAL E/E' RATIO: 7.4 M/S
LV SEPTAL E/E' RATIO: 8.22 M/S
MV A" WAVE DURATION": 6.85 MSEC
MV PEAK A VEL: 0.81 M/S
MV PEAK E VEL: 0.74 M/S
MV STENOSIS PRESSURE HALF TIME: 54.31 MS
MV VALVE AREA P 1/2 METHOD: 4.05 CM2
PISA TR MAX VEL: 2.29 M/S
PULM VEIN S/D RATIO: 0.89
PV PEAK D VEL: 0.54 M/S
PV PEAK S VEL: 0.48 M/S
RA MAJOR: 4.8 CM
RA PRESSURE: 3 MMHG
RA WIDTH: 3.65 CM
RIGHT VENTRICULAR END-DIASTOLIC DIMENSION: 3.74 CM
RV TISSUE DOPPLER FREE WALL SYSTOLIC VELOCITY 1 (APICAL 4 CHAMBER VIEW): 15.45 CM/S
SINUS: 2.9 CM
STJ: 2.74 CM
TDI LATERAL: 0.1 M/S
TDI SEPTAL: 0.09 M/S
TDI: 0.1 M/S
TR MAX PG: 21 MMHG
TRICUSPID ANNULAR PLANE SYSTOLIC EXCURSION: 2.27 CM
TV REST PULMONARY ARTERY PRESSURE: 24 MMHG

## 2022-04-04 PROCEDURE — 76819 PRENATAL TESTING - NST/AFI: ICD-10-PCS | Mod: 26,,, | Performed by: OBSTETRICS & GYNECOLOGY

## 2022-04-04 PROCEDURE — 93306 ECHO (CUPID ONLY): ICD-10-PCS | Mod: 26,,, | Performed by: INTERNAL MEDICINE

## 2022-04-04 PROCEDURE — 93306 TTE W/DOPPLER COMPLETE: CPT

## 2022-04-04 PROCEDURE — 76819 FETAL BIOPHYS PROFIL W/O NST: CPT

## 2022-04-04 PROCEDURE — 76819 FETAL BIOPHYS PROFIL W/O NST: CPT | Mod: 26,,, | Performed by: OBSTETRICS & GYNECOLOGY

## 2022-04-04 PROCEDURE — 93306 TTE W/DOPPLER COMPLETE: CPT | Mod: 26,,, | Performed by: INTERNAL MEDICINE

## 2022-04-06 ENCOUNTER — ANESTHESIA EVENT (OUTPATIENT)
Dept: OBSTETRICS AND GYNECOLOGY | Facility: OTHER | Age: 32
End: 2022-04-06

## 2022-04-06 ENCOUNTER — ANESTHESIA (OUTPATIENT)
Dept: OBSTETRICS AND GYNECOLOGY | Facility: OTHER | Age: 32
End: 2022-04-06

## 2022-04-06 ENCOUNTER — RESEARCH ENCOUNTER (OUTPATIENT)
Dept: RESEARCH | Facility: OTHER | Age: 32
End: 2022-04-06

## 2022-04-06 ENCOUNTER — HOSPITAL ENCOUNTER (OUTPATIENT)
Dept: PERINATAL CARE | Facility: OTHER | Age: 32
Discharge: HOME | DRG: 832 | End: 2022-04-06
Attending: STUDENT IN AN ORGANIZED HEALTH CARE EDUCATION/TRAINING PROGRAM
Payer: COMMERCIAL

## 2022-04-06 ENCOUNTER — HOSPITAL ENCOUNTER (INPATIENT)
Facility: OTHER | Age: 32
LOS: 3 days | Discharge: HOME | DRG: 832 | End: 2022-04-09
Attending: STUDENT IN AN ORGANIZED HEALTH CARE EDUCATION/TRAINING PROGRAM | Admitting: STUDENT IN AN ORGANIZED HEALTH CARE EDUCATION/TRAINING PROGRAM
Payer: COMMERCIAL

## 2022-04-06 DIAGNOSIS — O24.913 DIABETES MELLITUS AFFECTING PREGNANCY IN THIRD TRIMESTER: ICD-10-CM

## 2022-04-06 DIAGNOSIS — Z3A.32 32 WEEKS GESTATION OF PREGNANCY: ICD-10-CM

## 2022-04-06 DIAGNOSIS — O10.919 CHRONIC HYPERTENSION AFFECTING PREGNANCY: Primary | ICD-10-CM

## 2022-04-06 DIAGNOSIS — I10 CHRONIC HYPERTENSION: ICD-10-CM

## 2022-04-06 DIAGNOSIS — O10.919 CHRONIC HYPERTENSION AFFECTING PREGNANCY: ICD-10-CM

## 2022-04-06 DIAGNOSIS — O99.213 OBESITY AFFECTING PREGNANCY IN THIRD TRIMESTER: ICD-10-CM

## 2022-04-06 PROBLEM — O11.9 CHRONIC HYPERTENSION WITH SUPERIMPOSED PRE-ECLAMPSIA: Status: ACTIVE | Noted: 2022-04-06

## 2022-04-06 LAB
ABO + RH BLD: NORMAL
ALBUMIN SERPL BCP-MCNC: 3.1 G/DL (ref 3.5–5.2)
ALP SERPL-CCNC: 103 U/L (ref 55–135)
ALT SERPL W/O P-5'-P-CCNC: 13 U/L (ref 10–44)
ANION GAP SERPL CALC-SCNC: 11 MMOL/L (ref 8–16)
AST SERPL-CCNC: 28 U/L (ref 10–40)
BASOPHILS # BLD AUTO: 0.02 K/UL (ref 0–0.2)
BASOPHILS NFR BLD: 0.2 % (ref 0–1.9)
BILIRUB SERPL-MCNC: 0.4 MG/DL (ref 0.1–1)
BLD GP AB SCN CELLS X3 SERPL QL: NORMAL
BUN SERPL-MCNC: 9 MG/DL (ref 6–20)
CALCIUM SERPL-MCNC: 9.3 MG/DL (ref 8.7–10.5)
CHLORIDE SERPL-SCNC: 108 MMOL/L (ref 95–110)
CO2 SERPL-SCNC: 18 MMOL/L (ref 23–29)
CREAT SERPL-MCNC: 0.7 MG/DL (ref 0.5–1.4)
CREAT UR-MCNC: 223.6 MG/DL (ref 15–325)
DIFFERENTIAL METHOD: ABNORMAL
EOSINOPHIL # BLD AUTO: 0.1 K/UL (ref 0–0.5)
EOSINOPHIL NFR BLD: 0.7 % (ref 0–8)
ERYTHROCYTE [DISTWIDTH] IN BLOOD BY AUTOMATED COUNT: 15.9 % (ref 11.5–14.5)
EST. GFR  (AFRICAN AMERICAN): >60 ML/MIN/1.73 M^2
EST. GFR  (NON AFRICAN AMERICAN): >60 ML/MIN/1.73 M^2
GLUCOSE SERPL-MCNC: 102 MG/DL (ref 70–110)
HCT VFR BLD AUTO: 34.2 % (ref 37–48.5)
HGB BLD-MCNC: 11.6 G/DL (ref 12–16)
HIV 1+2 AB+HIV1 P24 AG SERPL QL IA: NEGATIVE
IMM GRANULOCYTES # BLD AUTO: 0.06 K/UL (ref 0–0.04)
IMM GRANULOCYTES NFR BLD AUTO: 0.5 % (ref 0–0.5)
LYMPHOCYTES # BLD AUTO: 2.6 K/UL (ref 1–4.8)
LYMPHOCYTES NFR BLD: 19.8 % (ref 18–48)
MCH RBC QN AUTO: 30.4 PG (ref 27–31)
MCHC RBC AUTO-ENTMCNC: 33.9 G/DL (ref 32–36)
MCV RBC AUTO: 90 FL (ref 82–98)
MONOCYTES # BLD AUTO: 0.9 K/UL (ref 0.3–1)
MONOCYTES NFR BLD: 6.4 % (ref 4–15)
NEUTROPHILS # BLD AUTO: 9.6 K/UL (ref 1.8–7.7)
NEUTROPHILS NFR BLD: 72.4 % (ref 38–73)
NRBC BLD-RTO: 0 /100 WBC
PLATELET # BLD AUTO: 283 K/UL (ref 150–450)
PMV BLD AUTO: 11.5 FL (ref 9.2–12.9)
POCT GLUCOSE: 127 MG/DL (ref 70–110)
POTASSIUM SERPL-SCNC: 4.1 MMOL/L (ref 3.5–5.1)
PROT SERPL-MCNC: 7.1 G/DL (ref 6–8.4)
PROT UR-MCNC: 21 MG/DL (ref 0–15)
PROT/CREAT UR: 0.09 MG/G{CREAT} (ref 0–0.2)
RBC # BLD AUTO: 3.82 M/UL (ref 4–5.4)
SARS-COV-2 RDRP RESP QL NAA+PROBE: NEGATIVE
SODIUM SERPL-SCNC: 137 MMOL/L (ref 136–145)
WBC # BLD AUTO: 13.18 K/UL (ref 3.9–12.7)

## 2022-04-06 PROCEDURE — 84156 ASSAY OF PROTEIN URINE: CPT | Performed by: STUDENT IN AN ORGANIZED HEALTH CARE EDUCATION/TRAINING PROGRAM

## 2022-04-06 PROCEDURE — 99283 EMERGENCY DEPT VISIT LOW MDM: CPT | Mod: 25,,, | Performed by: STUDENT IN AN ORGANIZED HEALTH CARE EDUCATION/TRAINING PROGRAM

## 2022-04-06 PROCEDURE — 63600175 PHARM REV CODE 636 W HCPCS: Performed by: STUDENT IN AN ORGANIZED HEALTH CARE EDUCATION/TRAINING PROGRAM

## 2022-04-06 PROCEDURE — 87389 HIV-1 AG W/HIV-1&-2 AB AG IA: CPT | Performed by: STUDENT IN AN ORGANIZED HEALTH CARE EDUCATION/TRAINING PROGRAM

## 2022-04-06 PROCEDURE — 99285 EMERGENCY DEPT VISIT HI MDM: CPT | Mod: 25

## 2022-04-06 PROCEDURE — 86592 SYPHILIS TEST NON-TREP QUAL: CPT | Performed by: STUDENT IN AN ORGANIZED HEALTH CARE EDUCATION/TRAINING PROGRAM

## 2022-04-06 PROCEDURE — 59025 FETAL NON-STRESS TEST: CPT

## 2022-04-06 PROCEDURE — 11000001 HC ACUTE MED/SURG PRIVATE ROOM

## 2022-04-06 PROCEDURE — 80053 COMPREHEN METABOLIC PANEL: CPT | Performed by: STUDENT IN AN ORGANIZED HEALTH CARE EDUCATION/TRAINING PROGRAM

## 2022-04-06 PROCEDURE — 59025 FETAL NON-STRESS TEST: CPT | Mod: 26,,, | Performed by: STUDENT IN AN ORGANIZED HEALTH CARE EDUCATION/TRAINING PROGRAM

## 2022-04-06 PROCEDURE — 85025 COMPLETE CBC W/AUTO DIFF WBC: CPT | Performed by: STUDENT IN AN ORGANIZED HEALTH CARE EDUCATION/TRAINING PROGRAM

## 2022-04-06 PROCEDURE — U0002 COVID-19 LAB TEST NON-CDC: HCPCS

## 2022-04-06 PROCEDURE — 25000003 PHARM REV CODE 250

## 2022-04-06 PROCEDURE — 99222 1ST HOSP IP/OBS MODERATE 55: CPT | Mod: ,,, | Performed by: OBSTETRICS & GYNECOLOGY

## 2022-04-06 PROCEDURE — 86850 RBC ANTIBODY SCREEN: CPT | Performed by: STUDENT IN AN ORGANIZED HEALTH CARE EDUCATION/TRAINING PROGRAM

## 2022-04-06 PROCEDURE — 76818 PRENATAL TESTING - NST/AFI: ICD-10-PCS | Mod: 26,,, | Performed by: OBSTETRICS & GYNECOLOGY

## 2022-04-06 PROCEDURE — 76818 FETAL BIOPHYS PROFILE W/NST: CPT | Mod: 26,,, | Performed by: OBSTETRICS & GYNECOLOGY

## 2022-04-06 PROCEDURE — 59025 PR FETAL 2N-STRESS TEST: ICD-10-PCS | Mod: 26,,, | Performed by: STUDENT IN AN ORGANIZED HEALTH CARE EDUCATION/TRAINING PROGRAM

## 2022-04-06 PROCEDURE — 76818 FETAL BIOPHYS PROFILE W/NST: CPT

## 2022-04-06 PROCEDURE — 96374 THER/PROPH/DIAG INJ IV PUSH: CPT | Mod: 59

## 2022-04-06 PROCEDURE — 25000003 PHARM REV CODE 250: Performed by: STUDENT IN AN ORGANIZED HEALTH CARE EDUCATION/TRAINING PROGRAM

## 2022-04-06 PROCEDURE — 99283 PR EMERGENCY DEPT VISIT,LEVEL III: ICD-10-PCS | Mod: 25,,, | Performed by: STUDENT IN AN ORGANIZED HEALTH CARE EDUCATION/TRAINING PROGRAM

## 2022-04-06 PROCEDURE — 99222 PR INITIAL HOSPITAL CARE,LEVL II: ICD-10-PCS | Mod: ,,, | Performed by: OBSTETRICS & GYNECOLOGY

## 2022-04-06 RX ORDER — SIMETHICONE 80 MG
1 TABLET,CHEWABLE ORAL EVERY 6 HOURS PRN
Status: DISCONTINUED | OUTPATIENT
Start: 2022-04-06 | End: 2022-04-09 | Stop reason: HOSPADM

## 2022-04-06 RX ORDER — INSULIN ASPART 100 [IU]/ML
10 INJECTION, SOLUTION INTRAVENOUS; SUBCUTANEOUS
Status: DISCONTINUED | OUTPATIENT
Start: 2022-04-07 | End: 2022-04-06

## 2022-04-06 RX ORDER — HYDRALAZINE HYDROCHLORIDE 20 MG/ML
10 INJECTION INTRAMUSCULAR; INTRAVENOUS ONCE AS NEEDED
Status: DISCONTINUED | OUTPATIENT
Start: 2022-04-06 | End: 2022-04-06

## 2022-04-06 RX ORDER — MAGNESIUM SULFATE HEPTAHYDRATE 40 MG/ML
4 INJECTION, SOLUTION INTRAVENOUS ONCE
Status: COMPLETED | OUTPATIENT
Start: 2022-04-06 | End: 2022-04-06

## 2022-04-06 RX ORDER — ASPIRIN 81 MG/1
81 TABLET ORAL DAILY
Status: DISCONTINUED | OUTPATIENT
Start: 2022-04-07 | End: 2022-04-09 | Stop reason: HOSPADM

## 2022-04-06 RX ORDER — ACETAMINOPHEN 325 MG/1
650 TABLET ORAL EVERY 6 HOURS PRN
Status: DISCONTINUED | OUTPATIENT
Start: 2022-04-06 | End: 2022-04-09 | Stop reason: HOSPADM

## 2022-04-06 RX ORDER — LABETALOL HYDROCHLORIDE 5 MG/ML
20 INJECTION, SOLUTION INTRAVENOUS ONCE
Status: COMPLETED | OUTPATIENT
Start: 2022-04-06 | End: 2022-04-06

## 2022-04-06 RX ORDER — ESCITALOPRAM OXALATE 10 MG/1
20 TABLET ORAL NIGHTLY
Status: DISCONTINUED | OUTPATIENT
Start: 2022-04-06 | End: 2022-04-09 | Stop reason: HOSPADM

## 2022-04-06 RX ORDER — BETAMETHASONE SODIUM PHOSPHATE AND BETAMETHASONE ACETATE 3; 3 MG/ML; MG/ML
12 INJECTION, SUSPENSION INTRA-ARTICULAR; INTRALESIONAL; INTRAMUSCULAR; SOFT TISSUE EVERY 24 HOURS
Status: DISCONTINUED | OUTPATIENT
Start: 2022-04-07 | End: 2022-04-06 | Stop reason: SDUPTHER

## 2022-04-06 RX ORDER — SODIUM CHLORIDE 0.9 % (FLUSH) 0.9 %
10 SYRINGE (ML) INJECTION
Status: DISCONTINUED | OUTPATIENT
Start: 2022-04-06 | End: 2022-04-09 | Stop reason: HOSPADM

## 2022-04-06 RX ORDER — CALCIUM GLUCONATE 98 MG/ML
1 INJECTION, SOLUTION INTRAVENOUS
Status: DISCONTINUED | OUTPATIENT
Start: 2022-04-06 | End: 2022-04-07

## 2022-04-06 RX ORDER — SODIUM CHLORIDE, SODIUM LACTATE, POTASSIUM CHLORIDE, CALCIUM CHLORIDE 600; 310; 30; 20 MG/100ML; MG/100ML; MG/100ML; MG/100ML
INJECTION, SOLUTION INTRAVENOUS CONTINUOUS
Status: DISCONTINUED | OUTPATIENT
Start: 2022-04-06 | End: 2022-04-07

## 2022-04-06 RX ORDER — ESCITALOPRAM OXALATE 10 MG/1
20 TABLET ORAL DAILY
Status: DISCONTINUED | OUTPATIENT
Start: 2022-04-07 | End: 2022-04-06

## 2022-04-06 RX ORDER — LABETALOL HYDROCHLORIDE 5 MG/ML
20 INJECTION, SOLUTION INTRAVENOUS ONCE AS NEEDED
Status: DISCONTINUED | OUTPATIENT
Start: 2022-04-06 | End: 2022-04-06

## 2022-04-06 RX ORDER — MAGNESIUM SULFATE HEPTAHYDRATE 40 MG/ML
2 INJECTION, SOLUTION INTRAVENOUS CONTINUOUS
Status: DISCONTINUED | OUTPATIENT
Start: 2022-04-06 | End: 2022-04-07

## 2022-04-06 RX ORDER — NIFEDIPINE 10 MG/1
10 CAPSULE ORAL ONCE
Status: COMPLETED | OUTPATIENT
Start: 2022-04-06 | End: 2022-04-06

## 2022-04-06 RX ORDER — BETAMETHASONE SODIUM PHOSPHATE AND BETAMETHASONE ACETATE 3; 3 MG/ML; MG/ML
12 INJECTION, SUSPENSION INTRA-ARTICULAR; INTRALESIONAL; INTRAMUSCULAR; SOFT TISSUE DAILY
Status: DISCONTINUED | OUTPATIENT
Start: 2022-04-06 | End: 2022-04-07 | Stop reason: SDUPTHER

## 2022-04-06 RX ORDER — DIPHENHYDRAMINE HCL 25 MG
25 CAPSULE ORAL EVERY 4 HOURS PRN
Status: DISCONTINUED | OUTPATIENT
Start: 2022-04-06 | End: 2022-04-09 | Stop reason: HOSPADM

## 2022-04-06 RX ORDER — PROCHLORPERAZINE EDISYLATE 5 MG/ML
5 INJECTION INTRAMUSCULAR; INTRAVENOUS EVERY 6 HOURS PRN
Status: DISCONTINUED | OUTPATIENT
Start: 2022-04-06 | End: 2022-04-09 | Stop reason: HOSPADM

## 2022-04-06 RX ORDER — LABETALOL HYDROCHLORIDE 5 MG/ML
40 INJECTION, SOLUTION INTRAVENOUS ONCE AS NEEDED
Status: DISCONTINUED | OUTPATIENT
Start: 2022-04-06 | End: 2022-04-06

## 2022-04-06 RX ORDER — ONDANSETRON 8 MG/1
8 TABLET, ORALLY DISINTEGRATING ORAL EVERY 8 HOURS PRN
Status: DISCONTINUED | OUTPATIENT
Start: 2022-04-06 | End: 2022-04-09 | Stop reason: HOSPADM

## 2022-04-06 RX ORDER — IBUPROFEN 200 MG
24 TABLET ORAL
Status: DISCONTINUED | OUTPATIENT
Start: 2022-04-06 | End: 2022-04-07

## 2022-04-06 RX ORDER — AMOXICILLIN 250 MG
1 CAPSULE ORAL NIGHTLY PRN
Status: DISCONTINUED | OUTPATIENT
Start: 2022-04-06 | End: 2022-04-09 | Stop reason: HOSPADM

## 2022-04-06 RX ORDER — LABETALOL HYDROCHLORIDE 5 MG/ML
80 INJECTION, SOLUTION INTRAVENOUS ONCE AS NEEDED
Status: DISCONTINUED | OUTPATIENT
Start: 2022-04-06 | End: 2022-04-06

## 2022-04-06 RX ORDER — PRENATAL WITH FERROUS FUM AND FOLIC ACID 3080; 920; 120; 400; 22; 1.84; 3; 20; 10; 1; 12; 200; 27; 25; 2 [IU]/1; [IU]/1; MG/1; [IU]/1; MG/1; MG/1; MG/1; MG/1; MG/1; MG/1; UG/1; MG/1; MG/1; MG/1; MG/1
1 TABLET ORAL DAILY
Status: DISCONTINUED | OUTPATIENT
Start: 2022-04-07 | End: 2022-04-09 | Stop reason: HOSPADM

## 2022-04-06 RX ORDER — DIPHENHYDRAMINE HYDROCHLORIDE 50 MG/ML
25 INJECTION INTRAMUSCULAR; INTRAVENOUS EVERY 4 HOURS PRN
Status: DISCONTINUED | OUTPATIENT
Start: 2022-04-06 | End: 2022-04-09 | Stop reason: HOSPADM

## 2022-04-06 RX ORDER — FAMOTIDINE 10 MG/ML
20 INJECTION INTRAVENOUS ONCE
Status: COMPLETED | OUTPATIENT
Start: 2022-04-07 | End: 2022-04-07

## 2022-04-06 RX ADMIN — ESCITALOPRAM OXALATE 20 MG: 10 TABLET ORAL at 09:04

## 2022-04-06 RX ADMIN — BETAMETHASONE SODIUM PHOSPHATE AND BETAMETHASONE ACETATE 12 MG: 3; 3 INJECTION, SUSPENSION INTRA-ARTICULAR; INTRALESIONAL; INTRAMUSCULAR at 07:04

## 2022-04-06 RX ADMIN — LABETALOL HYDROCHLORIDE 20 MG: 5 INJECTION INTRAVENOUS at 09:04

## 2022-04-06 RX ADMIN — SODIUM CHLORIDE, SODIUM LACTATE, POTASSIUM CHLORIDE, AND CALCIUM CHLORIDE 1000 ML: .6; .31; .03; .02 INJECTION, SOLUTION INTRAVENOUS at 06:04

## 2022-04-06 RX ADMIN — MAGNESIUM SULFATE HEPTAHYDRATE 4 G: 40 INJECTION, SOLUTION INTRAVENOUS at 06:04

## 2022-04-06 RX ADMIN — LABETALOL HYDROCHLORIDE 20 MG: 5 INJECTION INTRAVENOUS at 06:04

## 2022-04-06 RX ADMIN — NIFEDIPINE 10 MG: 10 CAPSULE ORAL at 05:04

## 2022-04-06 RX ADMIN — HUMAN INSULIN 10 UNITS: 100 INJECTION, SUSPENSION SUBCUTANEOUS at 09:04

## 2022-04-06 NOTE — ASSESSMENT & PLAN NOTE
- Followed by MFM  - Continue current regimen of NPH 10/14, aspart 10/x/10  - Given administration of BMZ tonight, will consider increasing requirements starting tomorrow vs SSI prn  - POCT ac/qhs

## 2022-04-06 NOTE — ED PROVIDER NOTES
Encounter Date: 2022          History     Chief Complaint   Patient presents with    Hypertension     Marcie Cheek is a 31 y.o. F at 32w0d presenting from PNT for elevated blood pressure. Patient had 2 severe range BP in PNT and was sent to the [facility] for further evaluation. She denies headaches, vision changes, CP, SOB, RUQ pain, LE edema.    This IUP is complicated by RNI, depression (lexapro), IUI pregnancy, cHTN (ASA, no meds), DM2 (NPH , lispro 10/14), morbid obesity (prepreg BMI >40).  Patient denies contractions, denies vaginal bleeding, denies LOF. Fetal Movement: normal.        Review of patient's allergies indicates:  No Known Allergies  Past Medical History:   Diagnosis Date    Anxiety     Depression     Diabetes mellitus affecting pregnancy in third trimester 3/14/2022    Hypertension      Past Surgical History:   Procedure Laterality Date    HYSTEROSCOPY      WISDOM TOOTH EXTRACTION       Family History   Problem Relation Age of Onset    Thyroid disease Maternal Grandmother     Breast cancer Neg Hx     Colon cancer Neg Hx     Ovarian cancer Neg Hx      Social History     Tobacco Use    Smoking status: Never Smoker    Smokeless tobacco: Never Used   Substance Use Topics    Alcohol use: Not Currently    Drug use: Never     Review of Systems   Constitutional: Negative for chills and fever.   HENT: Negative for congestion and sore throat.    Eyes: Negative for visual disturbance.   Respiratory: Negative for cough and shortness of breath.    Cardiovascular: Negative for chest pain and palpitations.   Gastrointestinal: Negative for abdominal pain, constipation, diarrhea, nausea and vomiting.   Genitourinary: Negative for dysuria, frequency, vaginal bleeding and vaginal discharge.   Musculoskeletal: Negative for back pain.   Skin: Negative for rash.   Neurological: Negative for weakness, light-headedness and headaches.   Hematological: Does not bruise/bleed easily.        Physical Exam     Initial Vitals   BP Pulse Resp Temp SpO2   04/06/22 1700 04/06/22 1659 04/06/22 1700 -- 04/06/22 1659   (!) 190/86 99 18  98 %      MAP       --                Physical Exam    Constitutional: She appears well-developed and well-nourished. No distress.   HENT:   Head: Normocephalic and atraumatic.   Eyes: EOM are normal.   Neck: Neck supple. No thyromegaly present.   Normal range of motion.  Cardiovascular: Normal rate.   Pulmonary/Chest: No respiratory distress.   Abdominal: There is no abdominal tenderness.   Gravid There is no rebound and no guarding.   Musculoskeletal:         General: No edema.      Cervical back: Normal range of motion and neck supple.     Neurological: She is alert and oriented to person, place, and time.   Skin: Skin is warm and dry.   Psychiatric: She has a normal mood and affect. Thought content normal.     OB LABOR EXAM:                       Comments: Deferred        ED Course   Obtain Fetal nonstress test (NST)    Date/Time: 4/6/2022 6:49 PM  Performed by: Mel Swann MD  Authorized by: William Quiroz MD     Nonstress Test:     Variability:  6-25 BPM    Decelerations:  None    Accelerations:  15 bpm    Baseline:  145    Uterine Irritability: No      Contractions:  Not present  Biophysical Profile:     Nonstress Test Interpretation: reactive      Overall Impression:  Reassuring  Post-procedure:     Patient tolerance:  Patient tolerated the procedure well with no immediate complications      Labs Reviewed   CBC W/ AUTO DIFFERENTIAL   COMPREHENSIVE METABOLIC PANEL   PROTEIN / CREATININE RATIO, URINE   CBC W/ AUTO DIFFERENTIAL   COMPREHENSIVE METABOLIC PANEL   PROTEIN / CREATININE RATIO, URINE          Imaging Results    None          Medications   NIFEdipine capsule 10 mg (has no administration in time range)     Medical Decision Making:   ED Management:  1. cHTN with SI Pre-Eclampsia with SF:   - Sustained severely elevated blood pressures, responded to  labetalol 20 mg; all other vitals wnl   - Procardia 10 mg given >> blood pressure continued to be severe >> labetalol 20 mg given >> responded with mild range pressures   - Asymptomatic   - NST RR; TOCO no contractions   - CBC: pending   - CMP: pending   - PCR: pending   - Will admit 2/2 cHTN with superimposed pre-eclampsia with severe features  - Magnesium for seizure prophylaxis   - BMZ given for fetal lung maturity, will give 2nd dose in 24 hours   - Presentation: cephalic    - Consents signed and in chart   - Discussed with OB ED staff and MFM, will admit to antepartum and continue to closely monitor. Patient stable for transfer to antepartum service.     Other:   I discussed test(s) with the performing physician.            Attending Attestation:   Physician Attestation Statement for Resident:  As the supervising MD   Physician Attestation Statement: I have personally seen and examined this patient.   I agree with the above history. -:   As the supervising MD I agree with the above PE.    As the supervising MD I agree with the above treatment, course, plan, and disposition.   -: 32 yo  at 32 weeks with CHTN with SI Pre E.  Admit to antepartum.  Reviewed with patient and  and all questions answered.   I was personally present during the critical portions of the procedure(s) performed by the resident and was immediately available in the ED to provide services and assistance as needed during the entire procedure.  I have reviewed and agree with the residents interpretation of the following: rhythm strips and lab data.  I have reviewed the following: old records at this facility.                         Clinical Impression:   Final diagnoses:  [Z3A.32] 32 weeks gestation of pregnancy  [I10] Chronic hypertension (Primary)                 Mel Swann MD  Resident  [id number]       Lissette Xavier MD  [phone]

## 2022-04-06 NOTE — LETTER
April 8, 2022         [address]  [address]LO[address]  [facility] [address]  Phone: [phone]  Fax: [phone]       Patient: Marcie Cheek   YOB: 1990  Date of Visit: 04/08/2022    To Whom It May Concern:    Everett Cheek  was at [facility] on 04/08/2022. The patient may return to work/school on *** {With/no:[id number]} restrictions. If you have any questions or concerns, or if I can be of further assistance, please do not hesitate to contact me.    Sincerely,    William Quiroz MD

## 2022-04-06 NOTE — HOSPITAL COURSE
04/06/2022 HD#1 Pt presented to [facility] with severe range pressures. Pt asymptomatic. Nifedipine 10 given in [facility] and pt admitted for cHTN with DIANE with SF and started on Magnesium, BMZ course. Pre-E labs, 3T labs, GBS collected  04/07/2022 - Received additional Labetalol 20 overnight, MgSO4 continued. Labs wnl, PC 0.14. Given cHTN and now adequate control of BP, will deescalate MgSO4 and obtain 24 hour urine protein to assess for possible superimposed preeclampsia. Completion of BMZ.  04/08/2022 - No severe range BP over last 24 hours. No symptoms.   04/09/2022 - Continues to deny any Pre E symptoms. Fetal monitoring reassuring

## 2022-04-06 NOTE — ASSESSMENT & PLAN NOTE
- Several sequential severe range BP today  - No meds for cHTN  - Labs:   - Baseline cr wnl. Previous P/C 0.14 (no baseline)  - Required PO procardia for severe range BP      Plan  - Admit to antepartum given new onset hypertension in setting of cHTN without any medication. Will plan for delivery at 34wga unless pt has severe symptoms, lab derangements, or fetal decompensation  - BMZ today  - Magnesium for seizure ppx  - Labs in AM  - NPO

## 2022-04-06 NOTE — SUBJECTIVE & OBJECTIVE
Obstetric HPI:  Patient reports None contractions, active fetal movement, No vaginal bleeding , No loss of fluid     This pregnancy has been complicated by RNI, depression (lexpro), IUI pregnancy, cHTN (ASA, no meds), DM2 (NPH , lispro 10/14), MO (pre-preg BMI>40).    OB History    Para Term  AB Living   1 0 0 0 0 0   SAB IAB Ectopic Multiple Live Births   0 0 0 0 0      # Outcome Date GA Lbr Harrison/2nd Weight Sex Delivery Anes PTL Lv   1 Current              Past Medical History:   Diagnosis Date    Anxiety     Depression     Diabetes mellitus affecting pregnancy in third trimester 3/14/2022    Hypertension      Past Surgical History:   Procedure Laterality Date    HYSTEROSCOPY      WISDOM TOOTH EXTRACTION         (Not in a hospital admission)      Review of patient's allergies indicates:  No Known Allergies     Family History       Problem Relation (Age of Onset)    Thyroid disease Maternal Grandmother          Tobacco Use    Smoking status: Never Smoker    Smokeless tobacco: Never Used   Substance and Sexual Activity    Alcohol use: Not Currently    Drug use: Never    Sexual activity: Yes     Partners: Male     Review of Systems   Constitutional: Negative.  Negative for chills, fatigue and fever.   Respiratory: Negative.  Negative for cough and shortness of breath.    Cardiovascular: Negative.  Negative for leg swelling.   Gastrointestinal: Negative.  Negative for abdominal pain, bloating, diarrhea, nausea and vomiting.   Genitourinary: Negative.  Negative for dysmenorrhea, dyspareunia, dysuria, menorrhagia, menstrual problem, pelvic pain, vaginal discharge, vaginal pain, urinary incontinence, vaginal dryness and vaginal odor.   Musculoskeletal: Negative.  Negative for joint swelling.   Integumentary:  Negative.   Neurological:  Negative for seizures and headaches.   All other systems reviewed and are negative.   Objective:     Vital Signs (Most Recent):  Pulse: 99 (22 1706)  Resp: 18  (04/06/22 1700)  BP: (!) 190/86 (04/06/22 1700)  SpO2: 99 % (04/06/22 1704)   Vital Signs (24h Range):  Pulse:  [92-99] 99  Resp:  [18] 18  SpO2:  [98 %-99 %] 99 %  BP: (190)/(86) 190/86        There is no height or weight on file to calculate BMI.    FHT: 130 Cat 1 (reassuring)  TOCO:  Irregular, uterine irritability    Physical Exam:   Constitutional: She is oriented to person, place, and time. She appears well-developed and well-nourished. No distress.    HENT:   Head: Normocephalic and atraumatic.    Eyes: Pupils are equal, round, and reactive to light. Conjunctivae are normal.     Cardiovascular:  Normal rate.             Pulmonary/Chest: Effort normal. No respiratory distress.        Abdominal: There is no abdominal tenderness. There is no rebound and no guarding.   Obese, gravid             Musculoskeletal: Normal range of motion and moves all extremeties.       Neurological: She is alert and oriented to person, place, and time.    Skin: Skin is warm and dry.    Psychiatric: She has a normal mood and affect. Her behavior is normal.     Cervix: deferred     Significant Labs:  Lab Results   Component Value Date    GROUPTRH O POS 11/19/2021    HEPBSAG Negative 01/12/2022       CBC: No results for input(s): WBC, RBC, HGB, HCT, PLT, MCV, MCH, MCHC in the last 48 hours.  CMP: No results for input(s): GLU, CALCIUM, ALBUMIN, PROT, NA, K, CO2, CL, BUN, CREATININE, ALKPHOS, ALT, AST, BILITOT in the last 48 hours.  I have personallly reviewed all pertinent lab results from the last 24 hours.

## 2022-04-06 NOTE — HPI
Marcie Cheek is a 31 y.o. F at 32w0d presenting from PNT for elevated blood pressure. Patient had 2 severe range BP in PNT and was sent to the [facility] for further evaluation. She denies headaches, vision changes, CP, SOB, RUQ pain, LE edema. In OB ED, pt had two more severe range BP. Continues to deny any symptoms.      This IUP is complicated by RNI, depression (lexapro), IUI pregnancy, cHTN (ASA, no meds), DM2 (NPH , lispro 10/14), morbid obesity (prepreg BMI >40).  Patient denies contractions, denies vaginal bleeding, denies LOF.   Fetal Movement: normal.

## 2022-04-06 NOTE — H&P
[facility] Emergency Dept (OB)  Obstetrics  History & Physical    Patient Name: Marcie Cheek  MRN: [id number]  Admission Date: 2022  Primary Care Provider: Primary Doctor No    Subjective:     Principal Problem:Chronic hypertension with superimposed pre-eclampsia    History of Present Illness:  Marcie Cheek is a 31 y.o. F at 32w0d presenting from PNT for elevated blood pressure. Patient had 2 severe range BP in PNT and was sent to the [facility] for further evaluation. She denies headaches, vision changes, CP, SOB, RUQ pain, LE edema. In OB ED, pt had two more severe range BP. Continues to deny any symptoms.      This IUP is complicated by RNI, depression (lexapro), IUI pregnancy, cHTN (ASA, no meds), DM2 (NPH , lispro 10/14), morbid obesity (prepreg BMI >40).  Patient denies contractions, denies vaginal bleeding, denies LOF.   Fetal Movement: normal.      Obstetric HPI:  Patient reports None contractions, active fetal movement, No vaginal bleeding , No loss of fluid     This pregnancy has been complicated by RNI, depression (lexpro), IUI pregnancy, cHTN (ASA, no meds), DM2 (NPH , lispro 10/14), MO (pre-preg BMI>40).    OB History    Para Term  AB Living   1 0 0 0 0 0   SAB IAB Ectopic Multiple Live Births   0 0 0 0 0      # Outcome Date GA Lbr Harrison/2nd Weight Sex Delivery Anes PTL Lv   1 Current              Past Medical History:   Diagnosis Date    Anxiety     Depression     Diabetes mellitus affecting pregnancy in third trimester 3/14/2022    Hypertension      Past Surgical History:   Procedure Laterality Date    HYSTEROSCOPY      WISDOM TOOTH EXTRACTION         (Not in a hospital admission)      Review of patient's allergies indicates:  No Known Allergies     Family History       Problem Relation (Age of Onset)    Thyroid disease Maternal Grandmother          Tobacco Use    Smoking status: Never Smoker    Smokeless tobacco: Never Used   Substance and Sexual Activity     Alcohol use: Not Currently    Drug use: Never    Sexual activity: Yes     Partners: Male     Review of Systems   Constitutional: Negative.  Negative for chills, fatigue and fever.   Respiratory: Negative.  Negative for cough and shortness of breath.    Cardiovascular: Negative.  Negative for leg swelling.   Gastrointestinal: Negative.  Negative for abdominal pain, bloating, diarrhea, nausea and vomiting.   Genitourinary: Negative.  Negative for dysmenorrhea, dyspareunia, dysuria, menorrhagia, menstrual problem, pelvic pain, vaginal discharge, vaginal pain, urinary incontinence, vaginal dryness and vaginal odor.   Musculoskeletal: Negative.  Negative for joint swelling.   Integumentary:  Negative.   Neurological:  Negative for seizures and headaches.   All other systems reviewed and are negative.   Objective:     Vital Signs (Most Recent):  Pulse: 99 (04/06/22 1706)  Resp: 18 (04/06/22 1700)  BP: (!) 190/86 (04/06/22 1700)  SpO2: 99 % (04/06/22 1704)   Vital Signs (24h Range):  Pulse:  [92-99] 99  Resp:  [18] 18  SpO2:  [98 %-99 %] 99 %  BP: (190)/(86) 190/86        There is no height or weight on file to calculate BMI.    FHT: 130 Cat 1 (reassuring)  TOCO:  Irregular, uterine irritability    Physical Exam:   Constitutional: She is oriented to person, place, and time. She appears well-developed and well-nourished. No distress.    HENT:   Head: Normocephalic and atraumatic.    Eyes: Pupils are equal, round, and reactive to light. Conjunctivae are normal.     Cardiovascular:  Normal rate.             Pulmonary/Chest: Effort normal. No respiratory distress.        Abdominal: There is no abdominal tenderness. There is no rebound and no guarding.   Obese, gravid             Musculoskeletal: Normal range of motion and moves all extremeties.       Neurological: She is alert and oriented to person, place, and time.    Skin: Skin is warm and dry.    Psychiatric: She has a normal mood and affect. Her behavior is normal.      Cervix: deferred     Significant Labs:  Lab Results   Component Value Date    GROUPTRH O POS 2021    HEPBSAG Negative 2022       CBC: No results for input(s): WBC, RBC, HGB, HCT, PLT, MCV, MCH, MCHC in the last 48 hours.  CMP: No results for input(s): GLU, CALCIUM, ALBUMIN, PROT, NA, K, CO2, CL, BUN, CREATININE, ALKPHOS, ALT, AST, BILITOT in the last 48 hours.  I have personallly reviewed all pertinent lab results from the last 24 hours.    Assessment/Plan:     31 y.o. female  at 32w0d for:    * Chronic hypertension with superimposed pre-eclampsia  - Several sequential severe range BP today  - No meds for cHTN  - Labs:   - Baseline cr wnl. Previous P/C 0.14 (no baseline)  - Required PO procardia for severe range BP      Plan  - Admit to antepartum given new onset hypertension in setting of cHTN without any medication. Will plan for delivery at 34wga unless pt has severe symptoms, lab derangements, or fetal decompensation  - BMZ today  - Magnesium for seizure ppx  - Labs in AM  - NPO    Diabetes mellitus affecting pregnancy in third trimester  - Followed by MFM  - Continue current regimen of NPH 10/14, aspart 10//10  - Given administration of BMZ tonight, will consider increasing requirements starting tomorrow vs SSI prn  - POCT ac/qhs    Depression affecting pregnancy  - Continue home lexapro    Obesity affecting pregnancy in third trimester  - Lovenox postpartum        Ilsa Mensah MD  Obstetrics  [facility] - Emergency Dept (OB)

## 2022-04-06 NOTE — PROGRESS NOTES
".Screening Visit  Sponsor: [employer] [facility]  Study: [employer]- A cross-sectional, interventional, single-arm study for validating the equivalence of [employer] [facility]FM Pro to clinical standard-of-care for performing antepartum fetal monitoring.  IRB/Protocol #: 2021.020/ YMT--US  Principle Investigator/ Sub-Investigator: Parker Evans MD  Site: [facility]  Location: [facility] Prenatal Testing  Subject Number: [id number]     Subject presented for enrollment in [employer] [facility] Pro.  Subject is 32 weeks 0 days pregnant.  Subject meets all eligibility criteria for enrollment in study.     Prior to the Informed Consent (IC) being signed, or any study protocol required data collection, testing, procedure, or intervention being performed, the following was done and/or discussed:?   · Patient was given a copy of the IC for review??   · Purpose of the study and qualifications to participate??   · Study design, follow up schedule  · Confidentiality and HIPAA Authorization for Release of Medical Records for the research trial/ subject's rights/research related injury?   · Risk, Benefits, Compensation and Costs?   · Participation in the research trial is voluntary and patient may withdraw at anytime?   · Contact information for study related questions?      Patient verbalizes understanding of the above: Yes?   Contact information for CRC and PI given to patient: Yes?   Patient able to adequately summarize: the purpose of the study, the risks associated with the study, and all procedures, and follow-ups associated with the study: Yes?      Informed Consent:   Consenting was completed in private area using the most current IRB approved version of the ICF (Revised 07 MAR 2022) and patient was given ample time to review consent prior to execution of ICF.  Before signing consent, patient was asked if she had any questions and she stated "no".   Marcie Cheek signed the IRB approved version of informed consent form for the [facility] Each page of the consent was reviewed with the patient and all questions answered satisfactorily. The patient signed the paper consent form and received a copy of same (copy of informed consent made and provided to patient). The original consent was scanned into electronic medical records (EPIC).    Time of Consent Execution: 14:50     Successful Screening Validation completed with MFM Pro: Yes  Name of Screening : Amy Hoover  Date of Screenin2022  Start time: 15:12  End Time: 15:57  Patient received [employer] Card Token # [id number].  Research participant received $100 stipend.

## 2022-04-07 ENCOUNTER — PATIENT MESSAGE (OUTPATIENT)
Dept: OBSTETRICS AND GYNECOLOGY | Facility: CLINIC | Age: 32
End: 2022-04-07
Payer: COMMERCIAL

## 2022-04-07 ENCOUNTER — PATIENT MESSAGE (OUTPATIENT)
Dept: MATERNAL FETAL MEDICINE | Facility: CLINIC | Age: 32
End: 2022-04-07
Payer: COMMERCIAL

## 2022-04-07 LAB
ALBUMIN SERPL BCP-MCNC: 3 G/DL (ref 3.5–5.2)
ALP SERPL-CCNC: 94 U/L (ref 55–135)
ALT SERPL W/O P-5'-P-CCNC: 15 U/L (ref 10–44)
ANION GAP SERPL CALC-SCNC: 12 MMOL/L (ref 8–16)
AST SERPL-CCNC: 20 U/L (ref 10–40)
BASOPHILS # BLD AUTO: 0.01 K/UL (ref 0–0.2)
BASOPHILS NFR BLD: 0.1 % (ref 0–1.9)
BILIRUB SERPL-MCNC: 0.5 MG/DL (ref 0.1–1)
BUN SERPL-MCNC: 5 MG/DL (ref 6–20)
CALCIUM SERPL-MCNC: 8.8 MG/DL (ref 8.7–10.5)
CHLORIDE SERPL-SCNC: 109 MMOL/L (ref 95–110)
CO2 SERPL-SCNC: 17 MMOL/L (ref 23–29)
CREAT SERPL-MCNC: 0.7 MG/DL (ref 0.5–1.4)
DIFFERENTIAL METHOD: ABNORMAL
EOSINOPHIL # BLD AUTO: 0 K/UL (ref 0–0.5)
EOSINOPHIL NFR BLD: 0 % (ref 0–8)
ERYTHROCYTE [DISTWIDTH] IN BLOOD BY AUTOMATED COUNT: 15.9 % (ref 11.5–14.5)
EST. GFR  (AFRICAN AMERICAN): >60 ML/MIN/1.73 M^2
EST. GFR  (NON AFRICAN AMERICAN): >60 ML/MIN/1.73 M^2
GLUCOSE SERPL-MCNC: 147 MG/DL (ref 70–110)
HCT VFR BLD AUTO: 33 % (ref 37–48.5)
HGB BLD-MCNC: 10.8 G/DL (ref 12–16)
IMM GRANULOCYTES # BLD AUTO: 0.06 K/UL (ref 0–0.04)
IMM GRANULOCYTES NFR BLD AUTO: 0.5 % (ref 0–0.5)
LYMPHOCYTES # BLD AUTO: 1.3 K/UL (ref 1–4.8)
LYMPHOCYTES NFR BLD: 10.4 % (ref 18–48)
MCH RBC QN AUTO: 29.7 PG (ref 27–31)
MCHC RBC AUTO-ENTMCNC: 32.7 G/DL (ref 32–36)
MCV RBC AUTO: 91 FL (ref 82–98)
MONOCYTES # BLD AUTO: 0.4 K/UL (ref 0.3–1)
MONOCYTES NFR BLD: 2.9 % (ref 4–15)
NEUTROPHILS # BLD AUTO: 10.5 K/UL (ref 1.8–7.7)
NEUTROPHILS NFR BLD: 86.1 % (ref 38–73)
NRBC BLD-RTO: 0 /100 WBC
PLATELET # BLD AUTO: 261 K/UL (ref 150–450)
PMV BLD AUTO: 11.5 FL (ref 9.2–12.9)
POCT GLUCOSE: 104 MG/DL (ref 70–110)
POCT GLUCOSE: 131 MG/DL (ref 70–110)
POCT GLUCOSE: 135 MG/DL (ref 70–110)
POCT GLUCOSE: 98 MG/DL (ref 70–110)
POTASSIUM SERPL-SCNC: 4.9 MMOL/L (ref 3.5–5.1)
PROT SERPL-MCNC: 6.8 G/DL (ref 6–8.4)
RBC # BLD AUTO: 3.64 M/UL (ref 4–5.4)
RPR SER QL: NORMAL
SODIUM SERPL-SCNC: 138 MMOL/L (ref 136–145)
WBC # BLD AUTO: 12.19 K/UL (ref 3.9–12.7)

## 2022-04-07 PROCEDURE — 99232 SBSQ HOSP IP/OBS MODERATE 35: CPT | Mod: 25,,, | Performed by: OBSTETRICS & GYNECOLOGY

## 2022-04-07 PROCEDURE — 59025 PR FETAL 2N-STRESS TEST: ICD-10-PCS | Mod: 26,,, | Performed by: OBSTETRICS & GYNECOLOGY

## 2022-04-07 PROCEDURE — 59025 FETAL NON-STRESS TEST: CPT | Mod: 26,,, | Performed by: OBSTETRICS & GYNECOLOGY

## 2022-04-07 PROCEDURE — 80053 COMPREHEN METABOLIC PANEL: CPT | Performed by: STUDENT IN AN ORGANIZED HEALTH CARE EDUCATION/TRAINING PROGRAM

## 2022-04-07 PROCEDURE — 25000003 PHARM REV CODE 250: Performed by: STUDENT IN AN ORGANIZED HEALTH CARE EDUCATION/TRAINING PROGRAM

## 2022-04-07 PROCEDURE — 63600175 PHARM REV CODE 636 W HCPCS: Performed by: OBSTETRICS & GYNECOLOGY

## 2022-04-07 PROCEDURE — 99232 PR SUBSEQUENT HOSPITAL CARE,LEVL II: ICD-10-PCS | Mod: 25,,, | Performed by: OBSTETRICS & GYNECOLOGY

## 2022-04-07 PROCEDURE — 36415 COLL VENOUS BLD VENIPUNCTURE: CPT | Performed by: STUDENT IN AN ORGANIZED HEALTH CARE EDUCATION/TRAINING PROGRAM

## 2022-04-07 PROCEDURE — 63600175 PHARM REV CODE 636 W HCPCS: Performed by: STUDENT IN AN ORGANIZED HEALTH CARE EDUCATION/TRAINING PROGRAM

## 2022-04-07 PROCEDURE — 11000001 HC ACUTE MED/SURG PRIVATE ROOM

## 2022-04-07 PROCEDURE — 85025 COMPLETE CBC W/AUTO DIFF WBC: CPT | Performed by: STUDENT IN AN ORGANIZED HEALTH CARE EDUCATION/TRAINING PROGRAM

## 2022-04-07 RX ORDER — FAMOTIDINE 20 MG/1
20 TABLET, FILM COATED ORAL 2 TIMES DAILY PRN
Status: DISCONTINUED | OUTPATIENT
Start: 2022-04-07 | End: 2022-04-09 | Stop reason: HOSPADM

## 2022-04-07 RX ORDER — BETAMETHASONE SODIUM PHOSPHATE AND BETAMETHASONE ACETATE 3; 3 MG/ML; MG/ML
12 INJECTION, SUSPENSION INTRA-ARTICULAR; INTRALESIONAL; INTRAMUSCULAR; SOFT TISSUE ONCE
Status: COMPLETED | OUTPATIENT
Start: 2022-04-07 | End: 2022-04-07

## 2022-04-07 RX ORDER — IBUPROFEN 200 MG
24 TABLET ORAL
Status: DISCONTINUED | OUTPATIENT
Start: 2022-04-07 | End: 2022-04-09 | Stop reason: HOSPADM

## 2022-04-07 RX ORDER — GLUCAGON 1 MG
1 KIT INJECTION
Status: DISCONTINUED | OUTPATIENT
Start: 2022-04-07 | End: 2022-04-09 | Stop reason: HOSPADM

## 2022-04-07 RX ORDER — INSULIN ASPART 100 [IU]/ML
1-10 INJECTION, SOLUTION INTRAVENOUS; SUBCUTANEOUS
Status: DISCONTINUED | OUTPATIENT
Start: 2022-04-07 | End: 2022-04-09 | Stop reason: HOSPADM

## 2022-04-07 RX ORDER — IBUPROFEN 200 MG
16 TABLET ORAL
Status: DISCONTINUED | OUTPATIENT
Start: 2022-04-07 | End: 2022-04-09 | Stop reason: HOSPADM

## 2022-04-07 RX ADMIN — HUMAN INSULIN 14 UNITS: 100 INJECTION, SUSPENSION SUBCUTANEOUS at 05:04

## 2022-04-07 RX ADMIN — SIMETHICONE 80 MG: 80 TABLET, CHEWABLE ORAL at 12:04

## 2022-04-07 RX ADMIN — FAMOTIDINE 20 MG: 20 TABLET ORAL at 04:04

## 2022-04-07 RX ADMIN — FAMOTIDINE 20 MG: 10 INJECTION, SOLUTION INTRAVENOUS at 12:04

## 2022-04-07 RX ADMIN — ESCITALOPRAM OXALATE 20 MG: 10 TABLET ORAL at 08:04

## 2022-04-07 RX ADMIN — ASPIRIN 81 MG: 81 TABLET, DELAYED RELEASE ORAL at 08:04

## 2022-04-07 RX ADMIN — BETAMETHASONE SODIUM PHOSPHATE AND BETAMETHASONE ACETATE 12 MG: 3; 3 INJECTION, SUSPENSION INTRA-ARTICULAR; INTRALESIONAL; INTRAMUSCULAR at 07:04

## 2022-04-07 RX ADMIN — PRENATAL VIT W/ FE FUMARATE-FA TAB 27-0.8 MG 1 TABLET: 27-0.8 TAB at 08:04

## 2022-04-07 RX ADMIN — ACETAMINOPHEN 650 MG: 325 TABLET, FILM COATED ORAL at 04:04

## 2022-04-07 RX ADMIN — ACETAMINOPHEN 650 MG: 325 TABLET, FILM COATED ORAL at 12:04

## 2022-04-07 NOTE — PLAN OF CARE
Problem: Adult Inpatient Plan of Care  Goal: Plan of Care Review  Outcome: Ongoing, Progressing     Problem: Bariatric Environmental Safety  Goal: Safety Maintained with Care  Outcome: Ongoing, Progressing     Problem: Diabetes Comorbidity  Goal: Blood Glucose Level Within Targeted Range  Outcome: Ongoing, Progressing     Problem: Hypertensive Disorders in Pregnancy  Goal: Maternal-Fetal Stabilization  Outcome: Ongoing, Progressing

## 2022-04-07 NOTE — ANESTHESIA PREPROCEDURE EVALUATION
[facility]  Anesthesia Pre-Operative Evaluation         Patient Name: Marcie Cheek  YOB: 1990  MRN: [id number]    2022      Marcie Cheek is a 31 y.o. female  @ 32w0d who presents for elevated blood pressure. Patient had 2 severe range BP in PNT and was sent to the [facility] for further evaluation. IUP c/b cHTN (ASA, no meds), DM2 (NPH , lispro 10/14), morbid obesity (prepreg BMI >40). Denies asthma, bleeding diathesis, anticoag, spinal d/o or prior spinal surgery.      OB History    Para Term  AB Living   1             SAB IAB Ectopic Multiple Live Births                  # Outcome Date GA Lbr Harrison/2nd Weight Sex Delivery Anes PTL Lv   1 Current                Review of patient's allergies indicates:  No Known Allergies    Wt Readings from Last 1 Encounters:   22 1121 133.4 kg (294 lb)       BP Readings from Last 3 Encounters:   22 (!) 143/77   22 130/80   22 131/81       Patient Active Problem List   Diagnosis    Foot dermatitis    Obesity affecting pregnancy in third trimester    Rubella non-immune status, antepartum    Elevated liver enzymes    Depression affecting pregnancy    Diabetes mellitus affecting pregnancy in third trimester    Chronic hypertension affecting pregnancy    Chronic hypertension with superimposed pre-eclampsia       Past Surgical History:   Procedure Laterality Date    HYSTEROSCOPY      WISDOM TOOTH EXTRACTION         Social History     Socioeconomic History    Marital status:    Tobacco Use    Smoking status: Never Smoker    Smokeless tobacco: Never Used   Substance and Sexual Activity    Alcohol use: Not Currently    Drug use: Never    Sexual activity: Yes     Partners: Male         Chemistry        Component Value Date/Time     2022 1759    K 4.1 2022 1759     2022 1759    CO2 18 (L) 2022 1759    BUN 9 2022 1759    CREATININE 0.7  04/06/2022 1759     04/06/2022 1759        Component Value Date/Time    CALCIUM 9.3 04/06/2022 1759    ALKPHOS 103 04/06/2022 1759    AST 28 04/06/2022 1759    ALT 13 04/06/2022 1759    BILITOT 0.4 04/06/2022 1759    ESTGFRAFRICA >60 04/06/2022 1759    EGFRNONAA >60 04/06/2022 1759            Lab Results   Component Value Date    WBC 13.18 (H) 04/06/2022    HGB 11.6 (L) 04/06/2022    HCT 34.2 (L) 04/06/2022    MCV 90 04/06/2022     04/06/2022       No results for input(s): PT, INR, PROTIME, APTT in the last 72 hours.            Pre-op Assessment    I have reviewed the Patient Summary Reports.    I have reviewed the NPO Status.   I have reviewed the Medications.     Review of Systems  Anesthesia Hx:  No problems with previous Anesthesia  Denies Family Hx of Anesthesia complications.   Denies Personal Hx of Anesthesia complications.   Social:  Non-Smoker    Hematology/Oncology:  Hematology Normal   Oncology Normal     EENT/Dental:EENT/Dental Normal   Cardiovascular:   Hypertension    Pulmonary:  Pulmonary Normal    Renal/:  Renal/ Normal     Hepatic/GI:  Hepatic/GI Normal    Musculoskeletal:  Musculoskeletal Normal    Neurological:  Neurology Normal    Endocrine:   Diabetes  Morbid Obesity / BMI > 40  Psych:   depression          Physical Exam  General: Well nourished, Cooperative, Alert and Oriented    Airway:  Mallampati: III   Mouth Opening: Normal  TM Distance: Normal  Tongue: Normal    Dental:  Intact    Chest/Lungs:  Normal Respiratory Rate    Heart:  Rate: Normal        Anesthesia Plan  Type of Anesthesia, risks & benefits discussed:    Anesthesia Type: Spinal, Epidural, CSE, Gen ETT  Intra-op Monitoring Plan: Standard ASA Monitors  Post Op Pain Control Plan: multimodal analgesia, epidural analgesia and IV/PO Opioids PRN  Induction:  rapid sequence  Airway Plan: Video, Post-Induction  Informed Consent: Informed consent signed with the Patient and all parties understand the risks and agree with  anesthesia plan.  All questions answered. Patient consented to blood products? Yes  ASA Score: 3  Day of Surgery Review of History & Physical: H&P Update referred to the surgeon/provider.    Ready For Surgery From Anesthesia Perspective.     .

## 2022-04-07 NOTE — ASSESSMENT & PLAN NOTE
- Several severe range BP on admission requiring procardia 10 and labetalol 20 in [facility]. Additional push of labetalol 20 overnight at 2200, good BP control since that time.   - No home meds   - Labs: CBC, CMP wnl, PC 0.09     Plan  - Given cHTN diagnosis, unclear if her severe range BP on admission is due to worsening underlying chronic hypertension (cHTN exacerbation) vs new onset superimposed preeclampsia, though high suspicion for the latter given no home meds and previous good BP control.   - BMZ 4/6-4/7   - MgSO4 continued overnight for seizure ppx, discontinue this AM given stable BP overnight  - AM CBC and CMP stable   - Given unclear etiology of worsening BP, will obtain 24 hour urine protein to further assess for proteinuria  - Regular diet     Discussed at length this AM that given need for multiple pushes of IV antihypertensives at admission as well as previously good BP control prior to presentation yesterday, we have high suspicion that this is superimposed preeclampsia. We discussed that if this is the case, we would recommend continued admission until delivery at 34 weeks gestation. Pt understanding and all questions answered. See attestations for full details of discussion.

## 2022-04-07 NOTE — PROGRESS NOTES
04/06/22 2126 04/06/22 2140   Vital Signs   BP (!) 184/93 (!) 163/85   MAP (mmHg) 131 117     MD notified of two severe BP.  Orders received for 20mg of Labetalol.

## 2022-04-07 NOTE — SUBJECTIVE & OBJECTIVE
Obstetric HPI:  Patient reports None contractions, active fetal movement, absent vaginal bleeding , absent loss of fluid      Objective:     Vital Signs (Most Recent):  Temp: 98 °F (36.7 °C) (04/07/22 0725)  Pulse: 96 (04/07/22 0929)  Resp: 16 (04/07/22 0950)  BP: 139/68 (04/07/22 0922)  SpO2: 97 % (04/07/22 0925) Vital Signs (24h Range):  Temp:  [97.8 °F (36.6 °C)-98.4 °F (36.9 °C)] 98 °F (36.7 °C)  Pulse:  [] 96  Resp:  [16-18] 16  SpO2:  [96 %-100 %] 97 %  BP: (121-196)/() 139/68     Weight: 132 kg (291 lb 0.1 oz)  Body mass index is 45.58 kg/m².    FHT: 125 Cat 1 (reassuring)  TOCO:  None       Intake/Output Summary (Last 24 hours) at 4/7/2022 1050  Last data filed at 4/7/2022 1028  Gross per 24 hour   Intake 1764.07 ml   Output 4750 ml   Net -2985.93 ml       Cervical Exam: deferred      Significant Labs:  Recent Lab Results  (Last 5 results in the past 24 hours)        04/07/22  0620   04/07/22  0231   04/06/22  2136   04/06/22  1817   04/06/22  1759        Albumin 3.0         3.1       Alkaline Phosphatase 94         103       ALT 15         13       Anion Gap 12         11       AST 20         28       Baso # 0.01         0.02       Basophil % 0.1         0.2       BILIRUBIN TOTAL 0.5  Comment: For infants and newborns, interpretation of results should be based  on gestational age, weight and in agreement with clinical  observations.    Premature Infant recommended reference ranges:  Up to 24 hours.............<8.0 mg/dL  Up to 48 hours............<12.0 mg/dL  3-5 days..................<15.0 mg/dL  6-29 days.................<15.0 mg/dL           0.4  Comment: For infants and newborns, interpretation of results should be based  on gestational age, weight and in agreement with clinical  observations.    Premature Infant recommended reference ranges:  Up to 24 hours.............<8.0 mg/dL  Up to 48 hours............<12.0 mg/dL  3-5 days..................<15.0 mg/dL  6-29 days.................<15.0  mg/dL         BUN 5         9       Calcium 8.8         9.3       Chloride 109         108       CO2 17         18       Creatinine 0.7         0.7       Differential Method Automated         Automated       eGFR if  >60         >60       eGFR if non  >60  Comment: Calculation used to obtain the estimated glomerular filtration  rate (eGFR) is the CKD-EPI equation.            >60  Comment: Calculation used to obtain the estimated glomerular filtration  rate (eGFR) is the CKD-EPI equation.          Eos # 0.0         0.1       Eosinophil % 0.0         0.7       Glucose 147         102       Gran # (ANC) 10.5         9.6       Gran % 86.1         72.4       Group & Rh         O POS       Hematocrit 33.0         34.2       Hemoglobin 10.8         11.6       HIV 1/2 Ag/Ab         Negative       Immature Grans (Abs) 0.06  Comment: Mild elevation in immature granulocytes is non specific and   can be seen in a variety of conditions including stress response,   acute inflammation, trauma and pregnancy. Correlation with other   laboratory and clinical findings is essential.           0.06  Comment: Mild elevation in immature granulocytes is non specific and   can be seen in a variety of conditions including stress response,   acute inflammation, trauma and pregnancy. Correlation with other   laboratory and clinical findings is essential.         Immature Granulocytes 0.5         0.5       INDIRECT SURINDER         NEG       Lymph # 1.3         2.6       Lymph % 10.4         19.8       MCH 29.7         30.4       MCHC 32.7         33.9       MCV 91         90       Mono # 0.4         0.9       Mono % 2.9         6.4       MPV 11.5         11.5       nRBC 0         0       Platelets 261         283       POCT Glucose   135   127           Potassium 4.9         4.1       PROTEIN TOTAL 6.8         7.1       RBC 3.64         3.82       RDW 15.9         15.9       RPR         Non-reactive        SARS-CoV-2 RNA, Amplification, Qual       Negative  Comment: This test utilizes isothermal nucleic acid amplification   technology to detect the SARS-CoV-2 RdRp nucleic acid segment.   The analytical sensitivity (limit of detection) is 125 genome   equivalents/mL.     A POSITIVE result implies infection with the SARS-CoV-2 virus;  the patient is presumed to be contagious.    A NEGATIVE result means that SARS-CoV-2 nucleic acids are not  present above the limit of detection. A NEGATIVE result should be   treated as presumptive. It does not rule out the possibility of   COVID-19 and should not be the sole basis for treatment decisions.   If COVID-19 is strongly suspected based on clinical and exposure   history, re-testing using an alternate molecular assay should be   considered.       This test is only for use under the Food and Drug   Administration s Emergency Use Authorization (EUA).   Commercial kits are provided by [employer].   Performance characteristics of the EUA have been independently  verified by [facility] Department of  Pathology and Laboratory Medicine.   _________________________________________________________________  The ID NOW COVID-19 Letter of Authorization, along with the   authorized Fact Sheet for Healthcare Providers, the authorized Fact  Sheet for Patients, and authorized labeling are available on the FDA   website:  www.fda.gov/MedicalDevices/Safety/EmergencySituations/skk362553.htm           Sodium 138         137       WBC 12.19         13.18                              Physical Exam:   Constitutional: She is oriented to person, place, and time. She appears well-developed and well-nourished.    HENT:   Head: Normocephalic and atraumatic.    Eyes: EOM are normal.     Cardiovascular:  Normal rate.             Pulmonary/Chest: Effort normal.        Abdominal: Soft. There is no abdominal tenderness.             Musculoskeletal: Normal range of motion.        Neurological: She is alert and oriented to person, place, and time.    Skin: Skin is warm and dry.    Psychiatric: She has a normal mood and affect. Her behavior is normal.

## 2022-04-07 NOTE — ASSESSMENT & PLAN NOTE
- -135 overnight, likely somewhat related to BMZ effect   - Home insulin: NPH 14/10, aspart 10/x/10   - Plan to increase NPH to 18/14 given recent BMZ    - BG checks fasting and 2 hour pp    - SSI ordered    - Diabetic diet

## 2022-04-07 NOTE — PROGRESS NOTES
[facility] - Antepartum ([place])  Obstetrics  Antepartum Progress Note    Patient Name: Marcie Cheek  MRN: [id number]  Admission Date: 2022  Hospital Length of Stay: 1 days  Attending Physician: Montez Kim MD  Primary Care Provider: Primary Doctor No    Subjective:     Principal Problem:Chronic hypertension with superimposed pre-eclampsia    HPI:  Marcie Cheek is a 31 y.o. F at 32w0d presenting from PNT for elevated blood pressure. Patient had 2 severe range BP in PNT and was sent to the [facility] for further evaluation. She denies headaches, vision changes, CP, SOB, RUQ pain, LE edema. In OB ED, pt had two more severe range BP. Continues to deny any symptoms.      This IUP is complicated by RNI, depression (lexapro), IUI pregnancy, cHTN (ASA, no meds), DM2 (NPH , lispro 10/14), morbid obesity (prepreg BMI >40).  Patient denies contractions, denies vaginal bleeding, denies LOF.   Fetal Movement: normal.      Hospital Course:  2022 HD#1 Pt presented to [facility] with severe range pressures. Pt asymptomatic. Nifedipine 10 given in [facility] and pt admitted for cHTN with DIANE with SF and started on Magnesium, BMZ course. Pre-E labs, 3T labs, GBS collected  2022 - Received additional Labetalol 20 overnight, MgSO4 continued. Labs wnl, PC 0.14. Given cHTN and now adequate control of BP, will deescalate MgSO4 and obtain 24 hour urine protein to assess for possible superimposed preeclampsia.       Obstetric HPI:  Patient reports None contractions, active fetal movement, absent vaginal bleeding , absent loss of fluid      Objective:     Vital Signs (Most Recent):  Temp: 98 °F (36.7 °C) (22)  Pulse: 96 (22 0929)  Resp: 16 (22 0950)  BP: 139/68 (22)  SpO2: 97 % (22) Vital Signs (24h Range):  Temp:  [97.8 °F (36.6 °C)-98.4 °F (36.9 °C)] 98 °F (36.7 °C)  Pulse:  [] 96  Resp:  [16-18] 16  SpO2:  [96 %-100 %] 97 %  BP: (121-196)/() 139/68     Weight:  132 kg (291 lb 0.1 oz)  Body mass index is 45.58 kg/m².    FHT: 125 Cat 1 (reassuring)  TOCO:  None       Intake/Output Summary (Last 24 hours) at 4/7/2022 1050  Last data filed at 4/7/2022 1028  Gross per 24 hour   Intake 1764.07 ml   Output 4750 ml   Net -2985.93 ml       Cervical Exam: deferred      Significant Labs:  Recent Lab Results  (Last 5 results in the past 24 hours)        04/07/22  0620   04/07/22  0231   04/06/22  2136   04/06/22  1817   04/06/22  1759        Albumin 3.0         3.1       Alkaline Phosphatase 94         103       ALT 15         13       Anion Gap 12         11       AST 20         28       Baso # 0.01         0.02       Basophil % 0.1         0.2       BILIRUBIN TOTAL 0.5  Comment: For infants and newborns, interpretation of results should be based  on gestational age, weight and in agreement with clinical  observations.    Premature Infant recommended reference ranges:  Up to 24 hours.............<8.0 mg/dL  Up to 48 hours............<12.0 mg/dL  3-5 days..................<15.0 mg/dL  6-29 days.................<15.0 mg/dL           0.4  Comment: For infants and newborns, interpretation of results should be based  on gestational age, weight and in agreement with clinical  observations.    Premature Infant recommended reference ranges:  Up to 24 hours.............<8.0 mg/dL  Up to 48 hours............<12.0 mg/dL  3-5 days..................<15.0 mg/dL  6-29 days.................<15.0 mg/dL         BUN 5         9       Calcium 8.8         9.3       Chloride 109         108       CO2 17         18       Creatinine 0.7         0.7       Differential Method Automated         Automated       eGFR if  >60         >60       eGFR if non  >60  Comment: Calculation used to obtain the estimated glomerular filtration  rate (eGFR) is the CKD-EPI equation.            >60  Comment: Calculation used to obtain the estimated glomerular filtration  rate (eGFR) is the  CKD-EPI equation.          Eos # 0.0         0.1       Eosinophil % 0.0         0.7       Glucose 147         102       Gran # (ANC) 10.5         9.6       Gran % 86.1         72.4       Group & Rh         O POS       Hematocrit 33.0         34.2       Hemoglobin 10.8         11.6       HIV 1/2 Ag/Ab         Negative       Immature Grans (Abs) 0.06  Comment: Mild elevation in immature granulocytes is non specific and   can be seen in a variety of conditions including stress response,   acute inflammation, trauma and pregnancy. Correlation with other   laboratory and clinical findings is essential.           0.06  Comment: Mild elevation in immature granulocytes is non specific and   can be seen in a variety of conditions including stress response,   acute inflammation, trauma and pregnancy. Correlation with other   laboratory and clinical findings is essential.         Immature Granulocytes 0.5         0.5       INDIRECT SURINDER         NEG       Lymph # 1.3         2.6       Lymph % 10.4         19.8       MCH 29.7         30.4       MCHC 32.7         33.9       MCV 91         90       Mono # 0.4         0.9       Mono % 2.9         6.4       MPV 11.5         11.5       nRBC 0         0       Platelets 261         283       POCT Glucose   135   127           Potassium 4.9         4.1       PROTEIN TOTAL 6.8         7.1       RBC 3.64         3.82       RDW 15.9         15.9       RPR         Non-reactive       SARS-CoV-2 RNA, Amplification, Qual       Negative  Comment: This test utilizes isothermal nucleic acid amplification   technology to detect the SARS-CoV-2 RdRp nucleic acid segment.   The analytical sensitivity (limit of detection) is 125 genome   equivalents/mL.     A POSITIVE result implies infection with the SARS-CoV-2 virus;  the patient is presumed to be contagious.    A NEGATIVE result means that SARS-CoV-2 nucleic acids are not  present above the limit of detection. A NEGATIVE result should be    treated as presumptive. It does not rule out the possibility of   COVID-19 and should not be the sole basis for treatment decisions.   If COVID-19 is strongly suspected based on clinical and exposure   history, re-testing using an alternate molecular assay should be   considered.       This test is only for use under the Food and Drug   Administration s Emergency Use Authorization (EUA).   Commercial kits are provided by [employer].   Performance characteristics of the EUA have been independently  verified by [facility] Department of  Pathology and Laboratory Medicine.   _________________________________________________________________  The ID NOW COVID-19 Letter of Authorization, along with the   authorized Fact Sheet for Healthcare Providers, the authorized Fact  Sheet for Patients, and authorized labeling are available on the FDA   website:  www.fda.gov/MedicalDevices/Safety/EmergencySituations/vjg018331.htm           Sodium 138         137       WBC 12.19         13.18                              Physical Exam:   Constitutional: She is oriented to person, place, and time. She appears well-developed and well-nourished.    HENT:   Head: Normocephalic and atraumatic.    Eyes: EOM are normal.     Cardiovascular:  Normal rate.             Pulmonary/Chest: Effort normal.        Abdominal: Soft. There is no abdominal tenderness.             Musculoskeletal: Normal range of motion.       Neurological: She is alert and oriented to person, place, and time.    Skin: Skin is warm and dry.    Psychiatric: She has a normal mood and affect. Her behavior is normal.     Assessment/Plan:     31 y.o. female  at 32w1d for:    * Chronic hypertension with superimposed pre-eclampsia  - Several severe range BP on admission requiring procardia 10 and labetalol 20 in [facility]. Additional push of labetalol 20 overnight at 2200, good BP control since that time.   - No home meds   - Labs: CBC, CMP wnl, PC 0.09      Plan  - Given cHTN diagnosis, unclear if her severe range BP on admission is due to worsening underlying chronic hypertension (cHTN exacerbation) vs new onset superimposed preeclampsia, though high suspicion for the latter given no home meds and previous good BP control.   - BMZ /-   - MgSO4 continued overnight for seizure ppx, discontinue this AM given stable BP overnight  - AM CBC and CMP stable   - Given unclear etiology of worsening BP, will obtain 24 hour urine protein to further assess for proteinuria  - Regular diet     Discussed at length this AM that given need for multiple pushes of IV antihypertensives at admission as well as previously good BP control prior to presentation yesterday, we have high suspicion that this is superimposed preeclampsia. We discussed that if this is the case, we would recommend continued admission until delivery at 34 weeks gestation. Pt understanding and all questions answered. See attestations for full details of discussion.       Diabetes mellitus affecting pregnancy in third trimester  - -135 overnight, likely somewhat related to BMZ effect   - Home insulin: NPH 14/10, aspart 10/x/10   - Plan to increase NPH to 18 given recent BMZ    - BG checks fasting and 2 hour pp    - SSI ordered    - Diabetic diet     Depression affecting pregnancy  - Continue home lexapro    Obesity affecting pregnancy in third trimester  - Lovenox postpartum for VTE ppx           Skyla Krishna MD  Obstetrics  [facility] - Antepartum ([place])      Patient seen and examined. Agree with resident assessment.31 year old  at 32w1d admitted for cHTN exacerbation vs superimposed preeclampsia with severe features    Patient asymptomatic this AM.    Temp:  [97.8 °F (36.6 °C)-98.4 °F (36.9 °C)] 98 °F (36.7 °C)  Pulse:  [] 96  Resp:  [16-18] 16  SpO2:  [96 %-100 %] 97 %  BP: (121-196)/() 139/68    NST: 120 bpm/moderate variability/ 2+ accels/ no decels    1. CHTN vs DIANE SF-mostly  mild to normal range overnight however did receive IV Labetalol 20 x2 and Procardia 10 mg PO.   Okay to Stop MgSO4 and advance to regular diet.  We discussed our plan extensively and reviewed the risks the diagnosis of preeclampsia with severe features carries and risk of maternal and fetal morbidity and mortality.  We reviewed maternal physiologic blood volume and blood pressure changes in the third trimester.  She strongly desires to go home. We discussed our recommendation to keep inpatient and observe blood pressures as high suspicion for super imposed severe preeclampsia.   Continue betamethasone course.  NST  BID    2. T2DM- given steroid effect, will increase NPH to 18/14 to be started with PM dose and additional sliding scale for meals. Continue blood glucose checks      Continue inpatient management    Alfred Knowles MD  PGY 6  Maternal Fetal Medicine  [facility]

## 2022-04-08 LAB
ALBUMIN SERPL BCP-MCNC: 3.3 G/DL (ref 3.5–5.2)
ALP SERPL-CCNC: 106 U/L (ref 55–135)
ALT SERPL W/O P-5'-P-CCNC: 19 U/L (ref 10–44)
ANION GAP SERPL CALC-SCNC: 13 MMOL/L (ref 8–16)
AST SERPL-CCNC: 22 U/L (ref 10–40)
BASOPHILS # BLD AUTO: 0.03 K/UL (ref 0–0.2)
BASOPHILS NFR BLD: 0.2 % (ref 0–1.9)
BILIRUB SERPL-MCNC: 0.4 MG/DL (ref 0.1–1)
BUN SERPL-MCNC: 9 MG/DL (ref 6–20)
CALCIUM SERPL-MCNC: 9.5 MG/DL (ref 8.7–10.5)
CHLORIDE SERPL-SCNC: 106 MMOL/L (ref 95–110)
CO2 SERPL-SCNC: 20 MMOL/L (ref 23–29)
CREAT SERPL-MCNC: 0.7 MG/DL (ref 0.5–1.4)
DIFFERENTIAL METHOD: ABNORMAL
EOSINOPHIL # BLD AUTO: 0 K/UL (ref 0–0.5)
EOSINOPHIL NFR BLD: 0.1 % (ref 0–8)
ERYTHROCYTE [DISTWIDTH] IN BLOOD BY AUTOMATED COUNT: 16.1 % (ref 11.5–14.5)
EST. GFR  (AFRICAN AMERICAN): >60 ML/MIN/1.73 M^2
EST. GFR  (NON AFRICAN AMERICAN): >60 ML/MIN/1.73 M^2
GLUCOSE SERPL-MCNC: 137 MG/DL (ref 70–110)
HCT VFR BLD AUTO: 35.3 % (ref 37–48.5)
HGB BLD-MCNC: 11.6 G/DL (ref 12–16)
IMM GRANULOCYTES # BLD AUTO: 0.26 K/UL (ref 0–0.04)
IMM GRANULOCYTES NFR BLD AUTO: 2 % (ref 0–0.5)
LYMPHOCYTES # BLD AUTO: 2.3 K/UL (ref 1–4.8)
LYMPHOCYTES NFR BLD: 17.6 % (ref 18–48)
MCH RBC QN AUTO: 30.4 PG (ref 27–31)
MCHC RBC AUTO-ENTMCNC: 32.9 G/DL (ref 32–36)
MCV RBC AUTO: 93 FL (ref 82–98)
MONOCYTES # BLD AUTO: 0.8 K/UL (ref 0.3–1)
MONOCYTES NFR BLD: 6.1 % (ref 4–15)
NEUTROPHILS # BLD AUTO: 9.8 K/UL (ref 1.8–7.7)
NEUTROPHILS NFR BLD: 74 % (ref 38–73)
NRBC BLD-RTO: 0 /100 WBC
PLATELET # BLD AUTO: 282 K/UL (ref 150–450)
PMV BLD AUTO: 11.3 FL (ref 9.2–12.9)
POCT GLUCOSE: 122 MG/DL (ref 70–110)
POCT GLUCOSE: 127 MG/DL (ref 70–110)
POCT GLUCOSE: 131 MG/DL (ref 70–110)
POCT GLUCOSE: 97 MG/DL (ref 70–110)
POTASSIUM SERPL-SCNC: 4.5 MMOL/L (ref 3.5–5.1)
PROT 24H UR-MRATE: NORMAL MG/SPEC (ref 0–100)
PROT SERPL-MCNC: 6.6 G/DL (ref 6–8.4)
PROT UR-MCNC: <7 MG/DL (ref 0–15)
RBC # BLD AUTO: 3.81 M/UL (ref 4–5.4)
SODIUM SERPL-SCNC: 139 MMOL/L (ref 136–145)
URINE COLLECTION DURATION: 24 HR
URINE VOLUME: 4550 ML
WBC # BLD AUTO: 13.24 K/UL (ref 3.9–12.7)

## 2022-04-08 PROCEDURE — 25000003 PHARM REV CODE 250: Performed by: STUDENT IN AN ORGANIZED HEALTH CARE EDUCATION/TRAINING PROGRAM

## 2022-04-08 PROCEDURE — 59025 FETAL NON-STRESS TEST: CPT | Mod: 26,,, | Performed by: OBSTETRICS & GYNECOLOGY

## 2022-04-08 PROCEDURE — 84156 ASSAY OF PROTEIN URINE: CPT | Performed by: STUDENT IN AN ORGANIZED HEALTH CARE EDUCATION/TRAINING PROGRAM

## 2022-04-08 PROCEDURE — 85025 COMPLETE CBC W/AUTO DIFF WBC: CPT | Performed by: STUDENT IN AN ORGANIZED HEALTH CARE EDUCATION/TRAINING PROGRAM

## 2022-04-08 PROCEDURE — 80053 COMPREHEN METABOLIC PANEL: CPT | Performed by: STUDENT IN AN ORGANIZED HEALTH CARE EDUCATION/TRAINING PROGRAM

## 2022-04-08 PROCEDURE — 11000001 HC ACUTE MED/SURG PRIVATE ROOM

## 2022-04-08 PROCEDURE — 59025 PR FETAL 2N-STRESS TEST: ICD-10-PCS | Mod: 26,,, | Performed by: OBSTETRICS & GYNECOLOGY

## 2022-04-08 PROCEDURE — 99232 SBSQ HOSP IP/OBS MODERATE 35: CPT | Mod: 25,,, | Performed by: OBSTETRICS & GYNECOLOGY

## 2022-04-08 PROCEDURE — 36415 COLL VENOUS BLD VENIPUNCTURE: CPT | Performed by: STUDENT IN AN ORGANIZED HEALTH CARE EDUCATION/TRAINING PROGRAM

## 2022-04-08 PROCEDURE — 99232 PR SUBSEQUENT HOSPITAL CARE,LEVL II: ICD-10-PCS | Mod: 25,,, | Performed by: OBSTETRICS & GYNECOLOGY

## 2022-04-08 RX ADMIN — PRENATAL VIT W/ FE FUMARATE-FA TAB 27-0.8 MG 1 TABLET: 27-0.8 TAB at 09:04

## 2022-04-08 RX ADMIN — FAMOTIDINE 20 MG: 20 TABLET ORAL at 06:04

## 2022-04-08 RX ADMIN — ESCITALOPRAM OXALATE 20 MG: 10 TABLET ORAL at 09:04

## 2022-04-08 RX ADMIN — ASPIRIN 81 MG: 81 TABLET, DELAYED RELEASE ORAL at 09:04

## 2022-04-08 NOTE — ASSESSMENT & PLAN NOTE
- No severe range BP overnight  - No home meds   - Labs: CBC, CMP wnl, PC 0.09     Plan  - Given cHTN diagnosis, unclear if her severe range BP on admission is due to worsening underlying chronic hypertension (cHTN exacerbation) vs new onset superimposed preeclampsia, though high suspicion for the latter given no home meds and previous good BP control.   - BMZ 4/6-4/7   - MgSO4 held  - CBC and CMP stable. Repeat q72hr  - 24hr urine TLTC  - Regular diet     On hospital day 1, discussed at length this AM that given need for multiple pushes of IV antihypertensives at admission as well as previously good BP control prior to presentation yesterday, we have high suspicion that this is superimposed preeclampsia. We discussed that if this is the case, we would recommend continued admission until delivery at 34 weeks gestation. Pt understanding and all questions answered.

## 2022-04-08 NOTE — PLAN OF CARE
Problem:  Fall Injury Risk  Goal: Absence of Fall, Infant Drop and Related Injury  Outcome: Ongoing, Progressing     Problem: Bariatric Environmental Safety  Goal: Safety Maintained with Care  Outcome: Ongoing, Progressing     Problem: Diabetes Comorbidity  Goal: Blood Glucose Level Within Targeted Range  Outcome: Ongoing, Progressing     Problem: Hypertensive Disorders in Pregnancy  Goal: Maternal-Fetal Stabilization  Outcome: Ongoing, Progressing   No acute changes over night Blood glucose and BP controlled

## 2022-04-08 NOTE — SUBJECTIVE & OBJECTIVE
Interim:  Pt denies HA, vision changes, SOB, or RUQ pain.   Patient reports no contractions, active fetal movement, absent vaginal bleeding , absent loss of fluid.      Objective:     Vital Signs (Most Recent):  Temp: 97 °F (36.1 °C) (04/08/22 0416)  Pulse: 82 (04/08/22 0416)  Resp: 18 (04/08/22 0416)  BP: (!) 128/58 (04/08/22 0416)  SpO2: 97 % (04/08/22 0416) Vital Signs (24h Range):  Temp:  [97 °F (36.1 °C)-98 °F (36.7 °C)] 97 °F (36.1 °C)  Pulse:  [] 82  Resp:  [16-18] 18  SpO2:  [96 %-100 %] 97 %  BP: (128-157)/(58-91) 128/58     Weight: 132 kg (291 lb 0.1 oz)  Body mass index is 45.58 kg/m².    FHT: 125 Cat 1 (reassuring)  TOCO:  None       Intake/Output Summary (Last 24 hours) at 4/8/2022 0626  Last data filed at 4/8/2022 0445  Gross per 24 hour   Intake 784.64 ml   Output 4590 ml   Net -3805.36 ml         Cervical Exam: deferred      Significant Labs:  Recent Lab Results         04/08/22  0602   04/07/22  2057   04/07/22  1745   04/07/22  1304        POCT Glucose 122   131   98   104               Physical Exam:   Constitutional: She is oriented to person, place, and time. She appears well-developed and well-nourished.    HENT:   Head: Normocephalic and atraumatic.    Eyes: EOM are normal.     Cardiovascular:  Normal rate.             Pulmonary/Chest: Effort normal.        Abdominal: Soft. There is no abdominal tenderness.             Musculoskeletal: Normal range of motion.       Neurological: She is alert and oriented to person, place, and time.    Skin: Skin is warm and dry.    Psychiatric: She has a normal mood and affect. Her behavior is normal.

## 2022-04-08 NOTE — ASSESSMENT & PLAN NOTE
- No severe range BP overnight  - No home meds   - Labs: CBC, CMP wnl, PC 0.09     Plan  - Given cHTN diagnosis, unclear if her severe range BP on admission is due to worsening underlying chronic hypertension (cHTN exacerbation) vs new onset superimposed preeclampsia, though high suspicion for the latter given no home meds and previous good BP control.   - BMZ 4/6-4/7   - MgSO4 held  - CBC and CMP stable. Repeat q72hr  - 24hr urine pending  - Regular diet     On hospital day 1, discussed at length this AM that given need for multiple pushes of IV antihypertensives at admission as well as previously good BP control prior to presentation yesterday, we have high suspicion that this is superimposed preeclampsia. We discussed that if this is the case, we would recommend continued admission until delivery at 34 weeks gestation. Pt understanding and all questions answered.

## 2022-04-08 NOTE — ASSESSMENT & PLAN NOTE
- -135 overnight, likely somewhat related to BMZ effect   - Home insulin: NPH 14/10, aspart 10/x/10   - NPH increased on 4/7 to 18/14 given recent BMZ    - BG checks fasting and 2 hour pp    - SSI ordered    - Diabetic diet

## 2022-04-08 NOTE — PROGRESS NOTES
[facility] - Antepartum ([place])  Obstetrics  Antepartum Progress Note    Patient Name: Marcie Cheek  MRN: [id number]  Admission Date: 2022  Hospital Length of Stay: 2 days  Attending Physician: Montez Kim MD  Primary Care Provider: Primary Doctor No    Subjective:     Principal Problem:Chronic hypertension with superimposed pre-eclampsia    HPI:  Marcie Cheek is a 31 y.o. F at 32w0d presenting from PNT for elevated blood pressure. Patient had 2 severe range BP in PNT and was sent to the [facility] for further evaluation. She denies headaches, vision changes, CP, SOB, RUQ pain, LE edema. In OB ED, pt had two more severe range BP. Continues to deny any symptoms.      This IUP is complicated by RNI, depression (lexapro), IUI pregnancy, cHTN (ASA, no meds), DM2 (NPH , lispro 10/14), morbid obesity (prepreg BMI >40).  Patient denies contractions, denies vaginal bleeding, denies LOF.   Fetal Movement: normal.      Hospital Course:  2022 HD#1 Pt presented to [facility] with severe range pressures. Pt asymptomatic. Nifedipine 10 given in [facility] and pt admitted for cHTN with DIANE with SF and started on Magnesium, BMZ course. Pre-E labs, 3T labs, GBS collected  2022 - Received additional Labetalol 20 overnight, MgSO4 continued. Labs wnl, PC 0.14. Given cHTN and now adequate control of BP, will deescalate MgSO4 and obtain 24 hour urine protein to assess for possible superimposed preeclampsia. Completion of BMZ.  2022 - No severe range BP over last 24 hours. No symptoms.       Interim:  Pt denies HA, vision changes, SOB, or RUQ pain.   Patient reports no contractions, active fetal movement, absent vaginal bleeding , absent loss of fluid.      Objective:     Vital Signs (Most Recent):  Temp: 97 °F (36.1 °C) (22)  Pulse: 82 (22)  Resp: 18 (22)  BP: (!) 128/58 (22)  SpO2: 97 % (22) Vital Signs (24h Range):  Temp:  [97 °F (36.1 °C)-98 °F (36.7  °C)] 97 °F (36.1 °C)  Pulse:  [] 82  Resp:  [16-18] 18  SpO2:  [96 %-100 %] 97 %  BP: (128-157)/(58-91) 128/58     Weight: 132 kg (291 lb 0.1 oz)  Body mass index is 45.58 kg/m².    FHT: 125 Cat 1 (reassuring)  TOCO:  None       Intake/Output Summary (Last 24 hours) at 2022 0626  Last data filed at 2022 0445  Gross per 24 hour   Intake 784.64 ml   Output 4590 ml   Net -3805.36 ml         Cervical Exam: deferred      Significant Labs:  Recent Lab Results         22  0602   22  2057   22  1745   22  1304        POCT Glucose 122   131   98   104               Physical Exam:   Constitutional: She is oriented to person, place, and time. She appears well-developed and well-nourished.    HENT:   Head: Normocephalic and atraumatic.    Eyes: EOM are normal.     Cardiovascular:  Normal rate.             Pulmonary/Chest: Effort normal.        Abdominal: Soft. There is no abdominal tenderness.             Musculoskeletal: Normal range of motion.       Neurological: She is alert and oriented to person, place, and time.    Skin: Skin is warm and dry.    Psychiatric: She has a normal mood and affect. Her behavior is normal.     Assessment/Plan:     31 y.o. female  at 32w2d for:    * Chronic hypertension with superimposed pre-eclampsia  - No severe range BP overnight  - No home meds   - Labs: CBC, CMP wnl, PC 0.09     Plan  - Given cHTN diagnosis, unclear if her severe range BP on admission is due to worsening underlying chronic hypertension (cHTN exacerbation) vs new onset superimposed preeclampsia, though high suspicion for the latter given no home meds and previous good BP control.   - BMZ -   - MgSO4 held  - CBC and CMP stable. Repeat q72hr  - 24hr urine pending  - Regular diet     On hospital day 1, discussed at length this AM that given need for multiple pushes of IV antihypertensives at admission as well as previously good BP control prior to presentation yesterday, we have  high suspicion that this is superimposed preeclampsia. We discussed that if this is the case, we would recommend continued admission until delivery at 34 weeks gestation. Pt understanding and all questions answered.      Diabetes mellitus affecting pregnancy in third trimester  - -135 overnight, likely somewhat related to BMZ effect   - Home insulin: NPH 14/10, aspart 10/x/10   - NPH increased on  to  given recent BMZ    - BG checks fasting and 2 hour pp    - SSI ordered    - Diabetic diet     Depression affecting pregnancy  - Continue home lexapro    Obesity affecting pregnancy in third trimester  - Lovenox postpartum for VTE ppx       INGRIS Quiroz MD  OBGYN PGY-3     Patient seen and examined. Agree with resident assessment.31 year old  at 32w2d admitted for cHTN exacerbation vs superimposed preeclampsia with severe features     Patient asymptomatic this AM.     Temp:  [97 °F (36.1 °C)-98.2 °F (36.8 °C)] 98.2 °F (36.8 °C)  Pulse:  [] 84  Resp:  [16-18] 18  SpO2:  [96 %-100 %] 97 %  BP: (128-150)/(58-73) 139/73    NST: 120 bpm/ moderate variability with accels, no decels    1. CHTN vs DIANE SF-all mild to normal range overnight. Did not require IV antihypertensives     We reviewed d our plan extensively and reviewed the risks the diagnosis of preeclampsia with severe features carries and risk of maternal and fetal morbidity and mortality.  We discussed our recommendation to keep inpatient and observe blood pressures as high suspicion for super imposed severe preeclampsia.   s/p betamethasone course.  NST  BID   24 hour urine pending    2. T2DM- given steroid effect, will increase NPH to 18/18 and additional sliding scale for meals. Continue blood glucose checks. Likely will require increase in AM NPH however will review in the late afternoon.       Continue inpatient management     Alfred Knolwes MD  PGY 6  Maternal Fetal Medicine  [facility]

## 2022-04-09 ENCOUNTER — PATIENT MESSAGE (OUTPATIENT)
Dept: MATERNAL FETAL MEDICINE | Facility: CLINIC | Age: 32
End: 2022-04-09
Payer: COMMERCIAL

## 2022-04-09 VITALS
WEIGHT: 291 LBS | HEART RATE: 75 BPM | BODY MASS INDEX: 45.67 KG/M2 | OXYGEN SATURATION: 98 % | DIASTOLIC BLOOD PRESSURE: 65 MMHG | RESPIRATION RATE: 16 BRPM | SYSTOLIC BLOOD PRESSURE: 135 MMHG | HEIGHT: 67 IN | TEMPERATURE: 97 F

## 2022-04-09 LAB — POCT GLUCOSE: 96 MG/DL (ref 70–110)

## 2022-04-09 PROCEDURE — 59025 PR FETAL 2N-STRESS TEST: ICD-10-PCS | Mod: 26,,, | Performed by: OBSTETRICS & GYNECOLOGY

## 2022-04-09 PROCEDURE — 59025 FETAL NON-STRESS TEST: CPT | Mod: 26,,, | Performed by: OBSTETRICS & GYNECOLOGY

## 2022-04-09 PROCEDURE — 99238 PR HOSPITAL DISCHARGE DAY,<30 MIN: ICD-10-PCS | Mod: 25,,, | Performed by: OBSTETRICS & GYNECOLOGY

## 2022-04-09 PROCEDURE — 99238 HOSP IP/OBS DSCHRG MGMT 30/<: CPT | Mod: 25,,, | Performed by: OBSTETRICS & GYNECOLOGY

## 2022-04-09 PROCEDURE — 25000003 PHARM REV CODE 250: Performed by: STUDENT IN AN ORGANIZED HEALTH CARE EDUCATION/TRAINING PROGRAM

## 2022-04-09 RX ORDER — INSULIN HUMAN 100 [IU]/ML
INJECTION, SUSPENSION SUBCUTANEOUS
Qty: 15 ML | Refills: 2 | Status: SHIPPED | OUTPATIENT
Start: 2022-04-09 | End: 2022-05-10

## 2022-04-09 RX ORDER — INSULIN LISPRO 100 [IU]/ML
INJECTION, SOLUTION INTRAVENOUS; SUBCUTANEOUS
Qty: 15 ML | Refills: 0 | Status: SHIPPED | OUTPATIENT
Start: 2022-04-09 | End: 2022-05-10

## 2022-04-09 RX ADMIN — ASPIRIN 81 MG: 81 TABLET, DELAYED RELEASE ORAL at 09:04

## 2022-04-09 RX ADMIN — PRENATAL VIT W/ FE FUMARATE-FA TAB 27-0.8 MG 1 TABLET: 27-0.8 TAB at 09:04

## 2022-04-09 NOTE — DISCHARGE INSTRUCTIONS
Call the clinic at [phone] or L & D after hours at [phone] for vaginal bleeding, leakage of fluids, contractions 4-5 in one hour, decreased fetal movements (less than 10 kicks in 2 hours), headache not relieved by Tylenol, blurry vision, or temp of 100.4 or greater.  Begin doing fetal kick counts, at least 10 movements in 2 hours starting at 28 weeks gestation.  Keep next clinic appointment.

## 2022-04-09 NOTE — DISCHARGE SUMMARY
[facility] - Antepartum ([place])  Obstetrics  Discharge Summary      Patient Name: Marcie Cheek  MRN: [id number]  Admission Date: 2022  Hospital Length of Stay: 3 days  Discharge Date and Time: 2022 11:45 AM  Attending Physician: Montez Kim MD   Discharging Provider: Stephanie Rosales MD   Primary Care Provider: Primary Doctor No    HPI: Marcie Cheek is a 31 y.o. F at 32w0d presenting from PNT for elevated blood pressure. Patient had 2 severe range BP in PNT and was sent to the [facility] for further evaluation. She denies headaches, vision changes, CP, SOB, RUQ pain, LE edema. In OB ED, pt had two more severe range BP. Continues to deny any symptoms.      This IUP is complicated by RNI, depression (lexapro), IUI pregnancy, cHTN (ASA, no meds), DM2 (NPH , lispro 10/14), morbid obesity (prepreg BMI >40).  Patient denies contractions, denies vaginal bleeding, denies LOF.   Fetal Movement: normal.      Hospital Course:   2022 - HD #1. Pt presented to [facility] with severe range pressures. Pt asymptomatic. Nifedipine 10 given in [facility] and pt admitted for cHTN with DIANE with SF and started on Magnesium, BMZ course. Pre-E labs, 3T labs, GBS collected  2022 - HD #2. Received additional Labetalol 20 overnight, MgSO4 continued. Labs wnl, PC 0.14. Given cHTN and now adequate control of BP, will deescalate MgSO4 and obtain 24 hour urine protein to assess for possible superimposed preeclampsia. Completion of BMZ.  2022 - HD #3. No severe range BP over last 24 hours. No symptoms.   2022 - HD #4. Continues to deny any Pre E symptoms. Fetal monitoring reassuring. Given stability of blood pressure over several days, stable for outpatient management. Recommend twice weekly prenatal testing. She has an appointment scheduled with MFM in 4 days. Will message primary OB for appointment in the next week as well.         Final Active Diagnoses:    Diagnosis Date Noted POA    PRINCIPAL PROBLEM:   Chronic hypertension affecting pregnancy [O10.919] 03/23/2022 Yes    Diabetes mellitus affecting pregnancy in third trimester [O24.913] 03/14/2022 Yes    Depression affecting pregnancy [O99.340, F32.A] 12/01/2021 Yes    Obesity affecting pregnancy in third trimester [O99.213] 12/01/2021 Yes      Problems Resolved During this Admission:        Significant Diagnostic Studies: Labs:   CMP   Recent Labs   Lab 04/08/22  1028      K 4.5      CO2 20*   *   BUN 9   CREATININE 0.7   CALCIUM 9.5   PROT 6.6   ALBUMIN 3.3*   BILITOT 0.4   ALKPHOS 106   AST 22   ALT 19   ANIONGAP 13   ESTGFRAFRICA >60   EGFRNONAA >60   , CBC   Recent Labs   Lab 04/08/22  1028   WBC 13.24*   HGB 11.6*   HCT 35.3*       and All labs within the past 24 hours have been reviewed        Immunizations     None          This patient has no babies on file.  Pending Diagnostic Studies:     None          Discharged Condition: good    Disposition: Home or Self Care    Follow Up:    Patient Instructions:      Activity as tolerated     Medications:  Current Discharge Medication List      CONTINUE these medications which have CHANGED    Details   insulin NPH (HUMULIN N NPH U-100 INSULIN) 100 unit/mL injection Inject 18 Units into the skin before breakfast AND 18 Units every evening.  Qty: 10 mL, Refills: 2    Comments: Dispense as a pen with pen needles if covered by insurance  Associated Diagnoses: Diabetes mellitus affecting pregnancy in third trimester         CONTINUE these medications which have NOT CHANGED    Details   aspirin (ECOTRIN) 81 MG EC tablet Take 81 mg by mouth once daily.      blood-glucose meter kit To check BG 4 times daily, to use with insurance preferred meter  Qty: 1 each, Refills: 0    Associated Diagnoses: Diet controlled gestational diabetes mellitus (GDM) in second trimester      EScitalopram oxalate (LEXAPRO) 20 MG tablet Take 1 tablet (20 mg total) by mouth once daily.  Qty: 30 tablet, Refills: 2     "  famotidine (PEPCID ORAL) Take by mouth as needed.      insulin syringe-needle U-100 1 mL 29 gauge x 1/2" Syrg 1 Syringe by Misc.(Non-Drug; Combo Route) route 4 (four) times daily.  Qty: 100 each, Refills: 3    Associated Diagnoses: Diabetes mellitus affecting pregnancy in third trimester      !! lancets Misc To check BG 4 times daily, to use with insurance preferred meter  Qty: 100 each, Refills: 2    Associated Diagnoses: Diet controlled gestational diabetes mellitus (GDM) in second trimester      PNV no.1/iron,carb/docus/folic (PREN VIT COMB.1-IRON CB-FA-DSS ORAL) Take by mouth.      TRUE METRIX GLUCOSE TEST STRIP Strp USE TO TEST BLOOD GLUCOSE FOUR TIMES DAILY  Qty: 100 strip, Refills: 1    Associated Diagnoses: Diet controlled gestational diabetes mellitus (GDM) in second trimester      !! TRUEPLUS LANCETS 30 gauge Misc        !! - Potential duplicate medications found. Please discuss with provider.          Stephanie Rosales MD  Obstetrics  [facility] - Antepartum ([place])  "

## 2022-04-09 NOTE — SUBJECTIVE & OBJECTIVE
Interim:  Pt denies HA, vision changes, SOB, or RUQ pain.   Patient reports no contractions, active fetal movement, absent vaginal bleeding , absent loss of fluid.      Objective:     Vital Signs (Most Recent):  Temp: 97.4 °F (36.3 °C) (04/09/22 0015)  Pulse: 73 (04/09/22 0015)  Resp: 18 (04/09/22 0015)  BP: 130/64 (04/09/22 0015)  SpO2: 97 % (04/09/22 0015) Vital Signs (24h Range):  Temp:  [97.3 °F (36.3 °C)-98.2 °F (36.8 °C)] 97.4 °F (36.3 °C)  Pulse:  [73-90] 73  Resp:  [18] 18  SpO2:  [95 %-100 %] 97 %  BP: (130-144)/(64-81) 130/64     Weight: 132 kg (291 lb 0.1 oz)  Body mass index is 45.58 kg/m².    FHT: 125 Cat 1 (reassuring)  TOCO:  None       Intake/Output Summary (Last 24 hours) at 4/9/2022 0612  Last data filed at 4/8/2022 1822  Gross per 24 hour   Intake 500 ml   Output 2490 ml   Net -1990 ml         Cervical Exam: deferred      Significant Labs:  Recent Lab Results  (Last 5 results in the past 24 hours)        04/08/22  2147   04/08/22  1957   04/08/22  1724   04/08/22  1227   04/08/22  1028        Urine Protein, Timed   Unable to calculate             Albumin         3.3       Alkaline Phosphatase         106       ALT         19       Anion Gap         13       AST         22       Baso #         0.03       Basophil %         0.2       BILIRUBIN TOTAL         0.4  Comment: For infants and newborns, interpretation of results should be based  on gestational age, weight and in agreement with clinical  observations.    Premature Infant recommended reference ranges:  Up to 24 hours.............<8.0 mg/dL  Up to 48 hours............<12.0 mg/dL  3-5 days..................<15.0 mg/dL  6-29 days.................<15.0 mg/dL         BUN         9       Calcium         9.5       Chloride         106       CO2         20       Creatinine         0.7       Differential Method         Automated       eGFR if          >60       eGFR if non          >60  Comment: Calculation used to  obtain the estimated glomerular filtration  rate (eGFR) is the CKD-EPI equation.          Eos #         0.0       Eosinophil %         0.1       Glucose         137       Gran # (ANC)         9.8       Gran %         74.0       Hematocrit         35.3       Hemoglobin         11.6       Immature Grans (Abs)         0.26  Comment: Mild elevation in immature granulocytes is non specific and   can be seen in a variety of conditions including stress response,   acute inflammation, trauma and pregnancy. Correlation with other   laboratory and clinical findings is essential.         Immature Granulocytes         2.0       Lymph #         2.3       Lymph %         17.6       MCH         30.4       MCHC         32.9       MCV         93       Mono #         0.8       Mono %         6.1       MPV         11.3       nRBC         0       Platelets         282       POCT Glucose 131     97   127         Potassium         4.5       PROTEIN TOTAL         6.6       PROTEIN URINE   <7             RBC         3.81       RDW         16.1       Sodium         139       Urine Collection Duration   24             Urine Total Volume   4550             WBC         13.24                              Physical Exam:   Constitutional: She is oriented to person, place, and time. She appears well-developed and well-nourished.    HENT:   Head: Normocephalic and atraumatic.    Eyes: EOM are normal.     Cardiovascular:  Normal rate.             Pulmonary/Chest: Effort normal.        Abdominal: Soft. There is no abdominal tenderness.             Musculoskeletal: Normal range of motion.       Neurological: She is alert and oriented to person, place, and time.    Skin: Skin is warm and dry.    Psychiatric: She has a normal mood and affect. Her behavior is normal.

## 2022-04-09 NOTE — PROGRESS NOTES
[facility] [place] - Antepartum ([place])  Obstetrics  Antepartum Progress Note    Patient Name: Marcie Cheek  MRN: [id number]  Admission Date: 2022  Hospital Length of Stay: 3 days  Attending Physician: Montez Kim MD  Primary Care Provider: Primary Doctor No    Subjective:     Principal Problem:Chronic hypertension with superimposed pre-eclampsia    HPI:  Marcie Cheek is a 31 y.o. F at 32w0d presenting from PNT for elevated blood pressure. Patient had 2 severe range BP in PNT and was sent to the [facility] for further evaluation. She denies headaches, vision changes, CP, SOB, RUQ pain, LE edema. In OB ED, pt had two more severe range BP. Continues to deny any symptoms.      This IUP is complicated by RNI, depression (lexapro), IUI pregnancy, cHTN (ASA, no meds), DM2 (NPH , lispro 10/14), morbid obesity (prepreg BMI >40).  Patient denies contractions, denies vaginal bleeding, denies LOF.   Fetal Movement: normal.      Hospital Course:  2022 HD#1 Pt presented to [facility] with severe range pressures. Pt asymptomatic. Nifedipine 10 given in [facility] and pt admitted for cHTN with DIANE with SF and started on Magnesium, BMZ course. Pre-E labs, 3T labs, GBS collected  2022 - Received additional Labetalol 20 overnight, MgSO4 continued. Labs wnl, PC 0.14. Given cHTN and now adequate control of BP, will deescalate MgSO4 and obtain 24 hour urine protein to assess for possible superimposed preeclampsia. Completion of BMZ.  2022 - No severe range BP over last 24 hours. No symptoms.   2022 - Continues to deny any Pre E symptoms. Fetal monitoring reassuring      Interim:  Pt denies HA, vision changes, SOB, or RUQ pain.   Patient reports no contractions, active fetal movement, absent vaginal bleeding , absent loss of fluid.      Objective:     Vital Signs (Most Recent):  Temp: 97.4 °F (36.3 °C) (225)  Pulse: 73 (22)  Resp: 18 (22)  BP: 130/64 (22 0015)  SpO2: 97 %  (22 0015) Vital Signs (24h Range):  Temp:  [97.3 °F (36.3 °C)-98.2 °F (36.8 °C)] 97.4 °F (36.3 °C)  Pulse:  [73-90] 73  Resp:  [18] 18  SpO2:  [95 %-100 %] 97 %  BP: (130-144)/(64-81) 130/64     Weight: 132 kg (291 lb 0.1 oz)  Body mass index is 45.58 kg/m².    FHT: 125 Cat 1 (reassuring) few variables  TOCO:  None       Intake/Output Summary (Last 24 hours) at 2022 0612  Last data filed at 2022 1822  Gross per 24 hour   Intake 500 ml   Output 2490 ml   Net -1990 ml         Cervical Exam: deferred      Recent Labs   Lab 22  1755 22  1759 22  0620 22  1028   WBC  --  13.18* 12.19 13.24*   HGB  --  11.6* 10.8* 11.6*   HCT  --  34.2* 33.0* 35.3*   PLT  --  283 261 282   BUN  --  9 5* 9   CREATININE  --  0.7 0.7 0.7   ALT  --  13 15 19   AST  --  28 20 22   UTPCR 0.09  --   --   --           Physical Exam:   Constitutional: She is oriented to person, place, and time. She appears well-developed and well-nourished.    HENT:   Head: Normocephalic and atraumatic.    Eyes: EOM are normal.     Cardiovascular:  Normal rate.             Pulmonary/Chest: Effort normal.        Abdominal: Soft. There is no abdominal tenderness.             Musculoskeletal: Normal range of motion.       Neurological: She is alert and oriented to person, place, and time.    Skin: Skin is warm and dry.    Psychiatric: She has a normal mood and affect. Her behavior is normal.     Assessment/Plan:     31 y.o. female  at 32w3d for:    * Chronic hypertension exacerbation*  - No severe range BP overnight  - No home meds   - Labs: CBC, CMP wnl, PC 0.09     Diabetes mellitus affecting pregnancy in third trimester  - -135 overnight, likely somewhat related to BMZ effect   - Home insulin: NPH 14/10, aspart 10/x/10   - NPH increased on  to  given recent BMZ    - BG checks fasting and 2 hour pp    - SSI ordered    - Diabetic diet     Depression affecting pregnancy  - Continue home lexapro    Obesity  affecting pregnancy in third trimester  - Lovenox postpartum for VTE ppx           INGRIS Quiroz MD  OBGYN PGY-3

## 2022-04-09 NOTE — PROGRESS NOTES
AM NST    FHT: 135 bpm, moderate variability, +accels, no decels.   [place]: No contractions      - NST reactive and reassuring      Stephanie Rosales MD  OBGYN PGY-4

## 2022-04-09 NOTE — NURSING
Discharge instructions reviewed with patient. All questions answered. PIV removed. Pt ambulated off unit with family.

## 2022-04-11 ENCOUNTER — TELEPHONE (OUTPATIENT)
Dept: OBSTETRICS AND GYNECOLOGY | Facility: CLINIC | Age: 32
End: 2022-04-11
Payer: COMMERCIAL

## 2022-04-12 ENCOUNTER — PATIENT MESSAGE (OUTPATIENT)
Dept: OBSTETRICS AND GYNECOLOGY | Facility: CLINIC | Age: 32
End: 2022-04-12
Payer: COMMERCIAL

## 2022-04-13 ENCOUNTER — ROUTINE PRENATAL (OUTPATIENT)
Dept: OBSTETRICS AND GYNECOLOGY | Facility: CLINIC | Age: 32
End: 2022-04-13
Payer: COMMERCIAL

## 2022-04-13 ENCOUNTER — HOSPITAL ENCOUNTER (OUTPATIENT)
Dept: PERINATAL CARE | Facility: OTHER | Age: 32
Discharge: HOME | End: 2022-04-13
Attending: STUDENT IN AN ORGANIZED HEALTH CARE EDUCATION/TRAINING PROGRAM
Payer: COMMERCIAL

## 2022-04-13 VITALS — BODY MASS INDEX: 46.41 KG/M2 | WEIGHT: 293 LBS | DIASTOLIC BLOOD PRESSURE: 74 MMHG | SYSTOLIC BLOOD PRESSURE: 130 MMHG

## 2022-04-13 DIAGNOSIS — O10.919 CHRONIC HYPERTENSION AFFECTING PREGNANCY: Primary | ICD-10-CM

## 2022-04-13 DIAGNOSIS — O10.919 CHRONIC HYPERTENSION AFFECTING PREGNANCY: ICD-10-CM

## 2022-04-13 DIAGNOSIS — O99.213 OBESITY AFFECTING PREGNANCY IN THIRD TRIMESTER: ICD-10-CM

## 2022-04-13 DIAGNOSIS — O24.913 DIABETES MELLITUS AFFECTING PREGNANCY IN THIRD TRIMESTER: ICD-10-CM

## 2022-04-13 DIAGNOSIS — O09.899 RUBELLA NON-IMMUNE STATUS, ANTEPARTUM: ICD-10-CM

## 2022-04-13 DIAGNOSIS — Z28.39 RUBELLA NON-IMMUNE STATUS, ANTEPARTUM: ICD-10-CM

## 2022-04-13 PROCEDURE — 76818 FETAL BIOPHYS PROFILE W/NST: CPT

## 2022-04-13 PROCEDURE — 0502F PR SUBSEQUENT PRENATAL CARE: ICD-10-PCS | Mod: CPTII,S$GLB,, | Performed by: STUDENT IN AN ORGANIZED HEALTH CARE EDUCATION/TRAINING PROGRAM

## 2022-04-13 PROCEDURE — 76818 PRENATAL TESTING - NST/AFI: ICD-10-PCS | Mod: 26,,, | Performed by: OBSTETRICS & GYNECOLOGY

## 2022-04-13 PROCEDURE — 99999 PR PBB SHADOW E&M-EST. PATIENT-LVL III: CPT | Mod: PBBFAC,,, | Performed by: STUDENT IN AN ORGANIZED HEALTH CARE EDUCATION/TRAINING PROGRAM

## 2022-04-13 PROCEDURE — 99999 PR PBB SHADOW E&M-EST. PATIENT-LVL III: ICD-10-PCS | Mod: PBBFAC,,, | Performed by: STUDENT IN AN ORGANIZED HEALTH CARE EDUCATION/TRAINING PROGRAM

## 2022-04-13 PROCEDURE — 0502F SUBSEQUENT PRENATAL CARE: CPT | Mod: CPTII,S$GLB,, | Performed by: STUDENT IN AN ORGANIZED HEALTH CARE EDUCATION/TRAINING PROGRAM

## 2022-04-13 PROCEDURE — 76818 FETAL BIOPHYS PROFILE W/NST: CPT | Mod: 26,,, | Performed by: OBSTETRICS & GYNECOLOGY

## 2022-04-13 NOTE — PROGRESS NOTES
Pt doing well. She was recently admitted for CHTN exacerbation -at first, unsure if pre E, but ruled out. BP at home have been some severe range, followed by mild. Tried on her 's cuff and it was 10-15 mmHG less. Believe to be not calibrated correctly. No sustained even on connected mom. Insulin also adjusted during hospitalization and sugars are doing well. Denies vaginal bleeding, LOF, contractions. Reports regular fetal movement. Denies symptoms of pre E.  VS reviewed.  Reviewed recommendation for IOL in 37th week. Date chosen. GBS collected.  Labor and pre E precautions reviewed.   RTC: 2 weeks

## 2022-04-13 NOTE — PATIENT INSTRUCTIONS
Bring the paper breast pump prescription to this location:  [email or web address]  [address] [place] [address]  [phone]    [email or web address]    [facility], Labor and Delivery is on the 6th floor of [facility]: [phone]

## 2022-04-17 ENCOUNTER — HOSPITAL ENCOUNTER (EMERGENCY)
Facility: OTHER | Age: 32
Discharge: HOME | End: 2022-04-17
Attending: OBSTETRICS & GYNECOLOGY
Payer: COMMERCIAL

## 2022-04-17 VITALS
TEMPERATURE: 98 F | RESPIRATION RATE: 18 BRPM | HEART RATE: 98 BPM | SYSTOLIC BLOOD PRESSURE: 133 MMHG | OXYGEN SATURATION: 99 % | DIASTOLIC BLOOD PRESSURE: 74 MMHG

## 2022-04-17 DIAGNOSIS — Z3A.33 33 WEEKS GESTATION OF PREGNANCY: ICD-10-CM

## 2022-04-17 DIAGNOSIS — S16.1XXA STRAIN OF NECK MUSCLE, INITIAL ENCOUNTER: Primary | ICD-10-CM

## 2022-04-17 PROCEDURE — 99284 EMERGENCY DEPT VISIT MOD MDM: CPT | Mod: 25

## 2022-04-17 PROCEDURE — 25000003 PHARM REV CODE 250

## 2022-04-17 PROCEDURE — 59025 FETAL NON-STRESS TEST: CPT | Mod: 26,,, | Performed by: OBSTETRICS & GYNECOLOGY

## 2022-04-17 PROCEDURE — 59025 PR FETAL 2N-STRESS TEST: ICD-10-PCS | Mod: 26,,, | Performed by: OBSTETRICS & GYNECOLOGY

## 2022-04-17 PROCEDURE — 82962 GLUCOSE BLOOD TEST: CPT

## 2022-04-17 PROCEDURE — 99284 PR EMERGENCY DEPT VISIT,LEVEL IV: ICD-10-PCS | Mod: 25,,, | Performed by: OBSTETRICS & GYNECOLOGY

## 2022-04-17 PROCEDURE — 59025 FETAL NON-STRESS TEST: CPT

## 2022-04-17 PROCEDURE — 99284 EMERGENCY DEPT VISIT MOD MDM: CPT | Mod: 25,,, | Performed by: OBSTETRICS & GYNECOLOGY

## 2022-04-17 RX ORDER — CYCLOBENZAPRINE HCL 10 MG
5 TABLET ORAL DAILY PRN
Qty: 5 TABLET | Refills: 0 | Status: SHIPPED | OUTPATIENT
Start: 2022-04-17 | End: 2022-06-21

## 2022-04-17 RX ORDER — CYCLOBENZAPRINE HCL 5 MG
5 TABLET ORAL ONCE
Status: COMPLETED | OUTPATIENT
Start: 2022-04-17 | End: 2022-04-17

## 2022-04-17 RX ORDER — ACETAMINOPHEN 500 MG
1000 TABLET ORAL ONCE
Status: COMPLETED | OUTPATIENT
Start: 2022-04-17 | End: 2022-04-17

## 2022-04-17 RX ORDER — LANOLIN ALCOHOL/MO/W.PET/CERES
400 CREAM (GRAM) TOPICAL ONCE
Status: COMPLETED | OUTPATIENT
Start: 2022-04-17 | End: 2022-04-17

## 2022-04-17 RX ORDER — CALCIUM CARBONATE 300MG(750)
400 TABLET,CHEWABLE ORAL EVERY 12 HOURS PRN
Qty: 10 TABLET | Refills: 0 | Status: SHIPPED | OUTPATIENT
Start: 2022-04-17 | End: 2022-06-21

## 2022-04-17 RX ADMIN — CYCLOBENZAPRINE HYDROCHLORIDE 5 MG: 5 TABLET, FILM COATED ORAL at 12:04

## 2022-04-17 RX ADMIN — ACETAMINOPHEN 1000 MG: 500 TABLET, FILM COATED ORAL at 12:04

## 2022-04-17 RX ADMIN — Medication 400 MG: at 01:04

## 2022-04-17 NOTE — DISCHARGE INSTRUCTIONS
Please return to Labor and Delivery if you experience any leaking of fluid (like your water bag broke), vaginal bleeding (soaking more than 1 pad per hour), decreased fetal movement, and/or having contractions every 2-5 minutes for 2 hours.     Labor  and Delivery: [phone]

## 2022-04-17 NOTE — ED PROVIDER NOTES
"Encounter Date: 2022       History     Chief Complaint   Patient presents with    Neck Pain    Back Pain     Marcie Cheek is a 31 y.o. F at 33w4d presents complaining of neck pain. Patient reports that since being discharged from the hospital 2 weeks ago she has had a "kink" on the right side of her neck. She states that the pain radiates to her right shoulder when she rotates her head a certain way. The pain has interfered with her ability to get comfortable while she sleeps. She is unable to rotate her head to the left without pain. She has tried taking tylenol intermittently and using heat packs that have provided some relief. She denies any motor or sensory changes. She denies any visual changes. She reports a mild frontal headache that started earlier today.     Patient denies contractions, denies vaginal bleeding, denies LOF.   Fetal Movement: normal.    This IUP is complicated by cHTN, type II DM, rubella non-immune, depression and IUI pregnancy.         Review of patient's allergies indicates:  No Known Allergies  Past Medical History:   Diagnosis Date    Anxiety     Depression     Diabetes mellitus affecting pregnancy in third trimester 3/14/2022    Hypertension      Past Surgical History:   Procedure Laterality Date    HYSTEROSCOPY      WISDOM TOOTH EXTRACTION       Family History   Problem Relation Age of Onset    Thyroid disease Maternal Grandmother     Breast cancer Neg Hx     Colon cancer Neg Hx     Ovarian cancer Neg Hx      Social History     Tobacco Use    Smoking status: Never Smoker    Smokeless tobacco: Never Used   Substance Use Topics    Alcohol use: Not Currently    Drug use: Never     Review of Systems   Constitutional: Negative for chills and fever.   HENT: Negative for congestion.         + neck pain   Eyes: Negative for visual disturbance.   Respiratory: Negative for cough and shortness of breath.    Cardiovascular: Negative for chest pain. " Left message that we are returning her call.   Gastrointestinal: Negative for abdominal pain.   Genitourinary: Negative for dysuria and vaginal bleeding.   Musculoskeletal: Positive for neck pain. Negative for back pain.   Neurological: Positive for headaches.       Physical Exam     Initial Vitals   BP Pulse Resp Temp SpO2   04/17/22 1237 04/17/22 1235 04/17/22 1232 04/17/22 1232 04/17/22 1236   131/74 102 18 98.3 °F (36.8 °C) 99 %      MAP       --                Physical Exam    Vitals reviewed.  Constitutional: She appears well-developed and well-nourished. She is not diaphoretic. No distress.   HENT:   Head: Normocephalic and atraumatic.   Neck:   Mild pain with head rotation to the left   Normal range of motion.  Cardiovascular: Normal rate.   Pulmonary/Chest: No respiratory distress.   Abdominal: Abdomen is soft. She exhibits no distension. There is no abdominal tenderness. There is no rebound and no guarding.   Musculoskeletal:      Cervical back: Normal range of motion. No rigidity. Muscular tenderness (on right aspect of posterior necks) present.     Neurological: She is alert and oriented to person, place, and time.   Skin: Skin is warm and dry.   Psychiatric: She has a normal mood and affect.         ED Course   Obtain Fetal nonstress test (NST)    Date/Time: 4/17/2022 12:35 PM  Performed by: Aleta Marino MD  Authorized by: Aleta Marino MD     Nonstress Test:     Variability:  6-25 BPM    Decelerations:  None    Accelerations:  15 bpm    Baseline:  140    Contractions:  Not present  Biophysical Profile:     Nonstress Test Interpretation: reactive      Overall Impression:  Reassuring  Post-procedure:     Patient tolerance:  Patient tolerated the procedure well with no immediate complications      Labs Reviewed - No data to display       Imaging Results    None          Medications   acetaminophen tablet 1,000 mg (1,000 mg Oral Given 4/17/22 1254)   cyclobenzaprine tablet 5 mg (5 mg Oral Given 4/17/22 1255)   magnesium oxide tablet 400 mg (400 mg  Oral Given 22 4954)     Medical Decision Making:   ED Management:  31 y.o. F at 33w4d presents complaining of neck pain.  VSS, afebrile   NST: reactive and reassuring, no contractions on toco  PE as above, not concerning for acute process  Neck pain improved with 5 mg flexeril  Headache unrelieved with tylenol. Mag oxide given with improvement in headache  Neck pain thought to be likely from muscle strain   Patient stable for discharge. Return precautions given.   Rx sent for short course of flexeril  and mag oxide     Other:   I have discussed this case with another health care provider.       <> Summary of the Discussion: Case discussed with [facility] staff who are in agreement with assessment and plan. Neck pain and headache improved with flexeril and mag oxide. Patient stable for discharge.             Attending Attestation:   Physician Attestation Statement for Resident:  As the supervising MD   Physician Attestation Statement: I have personally seen and examined this patient.   I agree with the above history. -:   As the supervising MD I agree with the above PE.    As the supervising MD I agree with the above treatment, course, plan, and disposition.   -: Patient evaluated and found to be stable, agree with resident's assessment and plan.  NST reactive and reassuring.  Neck pain improved with flexeril.  Agree with discharge to home.  I was personally present during the critical portions of the procedure(s) performed by the resident and was immediately available in the ED to provide services and assistance as needed during the entire procedure.  I have reviewed the following: old records at this facility.                         Clinical Impression:   Final diagnoses:  [Z3A.33] 33 weeks gestation of pregnancy  [S16.1XXA] Strain of neck muscle, initial encounter (Primary)          ED Disposition Condition    Discharge Stable        ED Prescriptions     Medication Sig Dispense Start Date End Date Auth.  Provider    cyclobenzaprine (FLEXERIL) 10 MG tablet Take 0.5 tablets (5 mg total) by mouth daily as needed for Muscle spasms. 5 tablet 4/17/2022  Aleta Marino MD    magnesium oxide 400 mg magnesium Tab Take 400 mg by mouth every 12 (twelve) hours as needed (headaches). 10 tablet 4/17/2022  Aleta Marino MD        Follow-up Information    None          Aleta Marino MD  Resident  04/17/22 7538       Louisa Luong MD  04/17/22 9914

## 2022-04-19 ENCOUNTER — PATIENT MESSAGE (OUTPATIENT)
Dept: MATERNAL FETAL MEDICINE | Facility: CLINIC | Age: 32
End: 2022-04-19
Payer: COMMERCIAL

## 2022-04-19 LAB — POCT GLUCOSE: 147 MG/DL (ref 70–110)

## 2022-04-20 ENCOUNTER — ROUTINE PRENATAL (OUTPATIENT)
Dept: OBSTETRICS AND GYNECOLOGY | Facility: CLINIC | Age: 32
End: 2022-04-20
Payer: COMMERCIAL

## 2022-04-20 ENCOUNTER — PROCEDURE VISIT (OUTPATIENT)
Dept: MATERNAL FETAL MEDICINE | Facility: CLINIC | Age: 32
End: 2022-04-20
Payer: COMMERCIAL

## 2022-04-20 ENCOUNTER — OFFICE VISIT (OUTPATIENT)
Dept: MATERNAL FETAL MEDICINE | Facility: CLINIC | Age: 32
End: 2022-04-20
Payer: COMMERCIAL

## 2022-04-20 VITALS
HEIGHT: 67 IN | WEIGHT: 293 LBS | BODY MASS INDEX: 45.99 KG/M2 | SYSTOLIC BLOOD PRESSURE: 126 MMHG | DIASTOLIC BLOOD PRESSURE: 80 MMHG

## 2022-04-20 VITALS — WEIGHT: 293 LBS | SYSTOLIC BLOOD PRESSURE: 132 MMHG | BODY MASS INDEX: 46.51 KG/M2 | DIASTOLIC BLOOD PRESSURE: 92 MMHG

## 2022-04-20 DIAGNOSIS — O10.919 CHRONIC HYPERTENSION AFFECTING PREGNANCY: ICD-10-CM

## 2022-04-20 DIAGNOSIS — O24.913 DIABETES MELLITUS AFFECTING PREGNANCY IN THIRD TRIMESTER: ICD-10-CM

## 2022-04-20 DIAGNOSIS — O99.340 DEPRESSION AFFECTING PREGNANCY: ICD-10-CM

## 2022-04-20 DIAGNOSIS — O10.919 CHRONIC HYPERTENSION AFFECTING PREGNANCY: Primary | ICD-10-CM

## 2022-04-20 DIAGNOSIS — O99.213 OBESITY AFFECTING PREGNANCY IN THIRD TRIMESTER: ICD-10-CM

## 2022-04-20 DIAGNOSIS — F32.A DEPRESSION AFFECTING PREGNANCY: ICD-10-CM

## 2022-04-20 DIAGNOSIS — Z36.89 ENCOUNTER FOR ULTRASOUND TO ASSESS FETAL GROWTH: ICD-10-CM

## 2022-04-20 DIAGNOSIS — Z20.822 ENCOUNTER FOR LABORATORY TESTING FOR COVID-19 VIRUS: ICD-10-CM

## 2022-04-20 DIAGNOSIS — Z28.39 RUBELLA NON-IMMUNE STATUS, ANTEPARTUM: ICD-10-CM

## 2022-04-20 DIAGNOSIS — O09.899 RUBELLA NON-IMMUNE STATUS, ANTEPARTUM: ICD-10-CM

## 2022-04-20 DIAGNOSIS — Z36.2 ENCOUNTER FOR FOLLOW-UP ULTRASOUND OF FETAL ANATOMY: ICD-10-CM

## 2022-04-20 PROCEDURE — 99999 PR PBB SHADOW E&M-EST. PATIENT-LVL III: ICD-10-PCS | Mod: PBBFAC,,, | Performed by: STUDENT IN AN ORGANIZED HEALTH CARE EDUCATION/TRAINING PROGRAM

## 2022-04-20 PROCEDURE — 76816 PR  US,PREGNANT UTERUS,F/U,TRANSABD APP: ICD-10-PCS | Mod: S$GLB,,, | Performed by: OBSTETRICS & GYNECOLOGY

## 2022-04-20 PROCEDURE — 99999 PR PBB SHADOW E&M-EST. PATIENT-LVL III: CPT | Mod: PBBFAC,,, | Performed by: STUDENT IN AN ORGANIZED HEALTH CARE EDUCATION/TRAINING PROGRAM

## 2022-04-20 PROCEDURE — 3079F PR MOST RECENT DIASTOLIC BLOOD PRESSURE 80-89 MM HG: ICD-10-PCS | Mod: CPTII,S$GLB,, | Performed by: OBSTETRICS & GYNECOLOGY

## 2022-04-20 PROCEDURE — 3074F PR MOST RECENT SYSTOLIC BLOOD PRESSURE < 130 MM HG: ICD-10-PCS | Mod: CPTII,S$GLB,, | Performed by: OBSTETRICS & GYNECOLOGY

## 2022-04-20 PROCEDURE — 3008F PR BODY MASS INDEX (BMI) DOCUMENTED: ICD-10-PCS | Mod: CPTII,S$GLB,, | Performed by: OBSTETRICS & GYNECOLOGY

## 2022-04-20 PROCEDURE — 99215 PR OFFICE/OUTPT VISIT, EST, LEVL V, 40-54 MIN: ICD-10-PCS | Mod: 25,S$GLB,, | Performed by: OBSTETRICS & GYNECOLOGY

## 2022-04-20 PROCEDURE — 1111F PR DISCHARGE MEDS RECONCILED W/ CURRENT OUTPATIENT MED LIST: ICD-10-PCS | Mod: CPTII,S$GLB,, | Performed by: OBSTETRICS & GYNECOLOGY

## 2022-04-20 PROCEDURE — 3074F SYST BP LT 130 MM HG: CPT | Mod: CPTII,S$GLB,, | Performed by: OBSTETRICS & GYNECOLOGY

## 2022-04-20 PROCEDURE — 3044F HG A1C LEVEL LT 7.0%: CPT | Mod: CPTII,S$GLB,, | Performed by: OBSTETRICS & GYNECOLOGY

## 2022-04-20 PROCEDURE — 99215 OFFICE O/P EST HI 40 MIN: CPT | Mod: 25,S$GLB,, | Performed by: OBSTETRICS & GYNECOLOGY

## 2022-04-20 PROCEDURE — 76819 FETAL BIOPHYS PROFIL W/O NST: CPT | Mod: S$GLB,,, | Performed by: OBSTETRICS & GYNECOLOGY

## 2022-04-20 PROCEDURE — 99999 PR PBB SHADOW E&M-EST. PATIENT-LVL III: ICD-10-PCS | Mod: PBBFAC,,, | Performed by: OBSTETRICS & GYNECOLOGY

## 2022-04-20 PROCEDURE — 1111F DSCHRG MED/CURRENT MED MERGE: CPT | Mod: CPTII,S$GLB,, | Performed by: OBSTETRICS & GYNECOLOGY

## 2022-04-20 PROCEDURE — 99999 PR PBB SHADOW E&M-EST. PATIENT-LVL III: CPT | Mod: PBBFAC,,, | Performed by: OBSTETRICS & GYNECOLOGY

## 2022-04-20 PROCEDURE — 3044F PR MOST RECENT HEMOGLOBIN A1C LEVEL <7.0%: ICD-10-PCS | Mod: CPTII,S$GLB,, | Performed by: OBSTETRICS & GYNECOLOGY

## 2022-04-20 PROCEDURE — 0502F SUBSEQUENT PRENATAL CARE: CPT | Mod: CPTII,S$GLB,, | Performed by: STUDENT IN AN ORGANIZED HEALTH CARE EDUCATION/TRAINING PROGRAM

## 2022-04-20 PROCEDURE — 3008F BODY MASS INDEX DOCD: CPT | Mod: CPTII,S$GLB,, | Performed by: OBSTETRICS & GYNECOLOGY

## 2022-04-20 PROCEDURE — 0502F PR SUBSEQUENT PRENATAL CARE: ICD-10-PCS | Mod: CPTII,S$GLB,, | Performed by: STUDENT IN AN ORGANIZED HEALTH CARE EDUCATION/TRAINING PROGRAM

## 2022-04-20 PROCEDURE — 76819 PR US, OB, FETAL BIOPHYSICAL, W/O NST: ICD-10-PCS | Mod: S$GLB,,, | Performed by: OBSTETRICS & GYNECOLOGY

## 2022-04-20 PROCEDURE — 76816 OB US FOLLOW-UP PER FETUS: CPT | Mod: S$GLB,,, | Performed by: OBSTETRICS & GYNECOLOGY

## 2022-04-20 PROCEDURE — 3079F DIAST BP 80-89 MM HG: CPT | Mod: CPTII,S$GLB,, | Performed by: OBSTETRICS & GYNECOLOGY

## 2022-04-20 NOTE — PROGRESS NOTES
Pt doing well, overall. Expected discomforts of pregnancy. Denies vaginal bleeding, LOF, contractions. Reports regular fetal movement. Denies symptoms of pre E. Had neck pain and headache that resolved with flexeril and tylenol, but has started coming back. Has flexeril at home, but did not try it today. Has mag oxide as well which helped  VS reviewed.  Saw MFM today. Increased dinner and qHS insulin  Labor and pre E precautions reviewed.   RTC: weekly

## 2022-04-20 NOTE — PATIENT INSTRUCTIONS
[facility], Labor and Delivery is on the 6th floor of [facility]: [phone]    [email or web address]

## 2022-04-21 ENCOUNTER — OFFICE VISIT (OUTPATIENT)
Dept: PSYCHIATRY | Facility: CLINIC | Age: 32
End: 2022-04-21
Payer: COMMERCIAL

## 2022-04-21 DIAGNOSIS — F41.1 GAD (GENERALIZED ANXIETY DISORDER): Primary | ICD-10-CM

## 2022-04-21 DIAGNOSIS — O99.340 DEPRESSION AFFECTING PREGNANCY: ICD-10-CM

## 2022-04-21 DIAGNOSIS — F32.A DEPRESSION AFFECTING PREGNANCY: ICD-10-CM

## 2022-04-21 PROCEDURE — 90834 PR PSYCHOTHERAPY W/PATIENT, 45 MIN: ICD-10-PCS | Mod: 95,,, | Performed by: PSYCHOLOGIST

## 2022-04-21 PROCEDURE — 3044F HG A1C LEVEL LT 7.0%: CPT | Mod: CPTII,95,, | Performed by: PSYCHOLOGIST

## 2022-04-21 PROCEDURE — 90834 PSYTX W PT 45 MINUTES: CPT | Mod: 95,,, | Performed by: PSYCHOLOGIST

## 2022-04-21 PROCEDURE — 3044F PR MOST RECENT HEMOGLOBIN A1C LEVEL <7.0%: ICD-10-PCS | Mod: CPTII,95,, | Performed by: PSYCHOLOGIST

## 2022-04-21 NOTE — PROGRESS NOTES
"Individual Psychotherapy (PhD/LCSW)    4/21/2022    The patient location is: Patient's home in [place], [place].  The chief complaint leading to consultation is: anxiety, depression  Visit type: audiovisual  Face to Face time with patient: 45 minutes of total time spent on the encounter, which includes face to face time and non-face to face time preparing to see the patient (eg, review of tests), Obtaining and/or reviewing separately obtained history, Documenting clinical information in the electronic or other health record, Independently interpreting results (not separately reported) and communicating results to the patient/family/caregiver, or Care coordination (not separately reported).   Each patient to whom he or she provides medical services by telemedicine is:  (1) informed of the relationship between the physician and patient and the respective role of any other health care provider with respect to management of the patient; and (2) notified that he or she may decline to receive medical services by telemedicine and may withdraw from such care at any time.       Therapeutic Intervention: Met with patient.  Outpatient - Insight oriented psychotherapy 45 min - CPT code 58068 and Outpatient - Behavior modifying psychotherapy 45 min - CPT code 11104    Chief complaint/reason for encounter: depression and anxiety     Interval history and content of current session: Patient was timely for her individual therapy session via video visit. She reported the last few weeks have been "a cluster." She was admitted to the hospital due to high blood pressure, and she was angry about the whole situation due to the chaos from the medical professionals in the room and lack of communication with her. Also, her  was panicking and contacted all of her family saying she was having the baby that day. She was angry with him too. Since then, they have discussed not telling anyone what is happening until they are in agreement about " the communication. She was relieved to be discharged after three days of monitoring. She noted feeling resentful with the baby at times because he/she is causing all of this turmoil in her body. We processed the feelings towards the baby. She is hoping she will finally bond with the baby when born because she still does not feel connected. She met with her , which helped her feel more prepared and calm about the labor and delivery. She has stopped working and is focusing on self-care including eating right and light exercise/stretching. She was encouraged to practice calm breathing meditations daily until the birth of the baby.    Treatment plan:  · Target symptoms: depression, anxiety   · Why chosen therapy is appropriate versus another modality: relevant to diagnosis, evidence based practice  · Outcome monitoring methods: self-report, observation  · Therapeutic intervention type: insight oriented psychotherapy, behavior modifying psychotherapy    Risk parameters:  Patient reports no suicidal ideation  Patient reports no homicidal ideation  Patient reports no self-injurious behavior  Patient reports no violent behavior    Verbal deficits: None    Patient's response to intervention:  The patient's response to intervention is accepting.    Progress toward goals and other mental status changes:  The patient's progress toward goals is fair .    Mental Status Exam:  General Appearance:  unremarkable, age appropriate   Speech: normal tone, normal rate, normal pitch, normal volume      Level of Cooperation: cooperative      Thought Processes: normal and logical   Mood: euthymic      Thought Content: normal, no suicidality, no homicidality, delusions, or paranoia   Affect: congruent and appropriate   Orientation: Oriented x3   Attention Span & Concentration: intact   Judgment & Insight: fair     Language  intact     Diagnosis:     ICD-10-CM ICD-9-CM   1. CANDACE (generalized anxiety disorder)  F41.1 300.02   2. Depression  affecting pregnancy  O99.340 648.40    F32.A 311     Plan:  individual psychotherapy and medication management by physician    Return to clinic: 2 weeks    Length of Service (minutes): 45

## 2022-04-21 NOTE — ASSESSMENT & PLAN NOTE
Comorbidities: CHTN/Obesity  Prepregnancy regimen:           Metformin  Baseline HgA1c: 5.6  Last A1c: 4.4 ()  Current regimen (as of 2022):              NPH   FETAL ECHO - 3/29 - Elly - WNL  MATERNAL ECHO - pending  Dietary counseling - declined    Blood sugar review:  - per above    Recommendations:   Given sugars values above (elevated fastings, lunch and dinner) we will adjust her regimen:  o NPH   o Lispro x/x/10   Hypoglycemia precautions were once again reviewed.   Continue to check BS 4x daily   Send BS log weekly through portal.    Continue Twice weekly PNT     Delivery timing:  o 37 0/7 to 37 and 67 weeks if longstanding diabetes or poorly controlled, polyhydramnios, EFW>90th percentile, or BMI >= 40 (CURRENTLY)  o 36 0/7 to 37 and 6/7 weeks if vascular complications, prior stillbirth or other complicating conditions.  Recommend consideration of earlier delivery if IUGR, HTN, or other complications  Recommend offering  for delivery is EFW is 4500g or more near the time of delivery

## 2022-04-21 NOTE — PROGRESS NOTES
"Maternal Fetal Medicine follow up consult    SUBJECTIVE:     Marcie Cheek is a 31 y.o.  female with IUP at 34w0d who is seen in follow up consultation by MFM.  Pregnancy complications include:   Problem   Chronic Hypertension Affecting Pregnancy   Diabetes Mellitus Affecting Pregnancy in Third Trimester       Previous notes reviewed.   No changes to medical, surgical, family, social, or obstetric history.    Interval history since last M visit:   Patient was admitted to the hospital for exacerbation of her CHTN. Since then, report no issues.   Patient denies any Pre-E sx. Denies HA, BV, RUQ pain.            Medications:  Current Outpatient Medications   Medication Instructions    aspirin (ECOTRIN) 81 mg, Oral, Daily    blood-glucose meter kit To check BG 4 times daily, to use with insurance preferred meter    cyclobenzaprine (FLEXERIL) 5 mg, Oral, Daily PRN    EScitalopram oxalate (LEXAPRO) 20 mg, Oral, Daily    famotidine (PEPCID ORAL) Oral, As needed (PRN)    insulin lispro (HUMALOG KWIKPEN INSULIN) 100 unit/mL pen Inject 5 units with breakfast and 5 units with dinner. will last 150 days    insulin NPH isoph U-100 human (HUMULIN N NPH INSULIN KWIKPEN) 100 unit/mL (3 mL) InPn Inject 18 Units into the skin before breakfast AND 18 Units every evening.    insulin syringe-needle U-100 1 mL 29 gauge x 1/2" Syrg 1 Syringe, Misc.(Non-Drug; Combo Route), 4 times daily    lancets Misc To check BG 4 times daily, to use with insurance preferred meter    magnesium oxide 400 mg, Oral, Every 12 hours PRN    pen needle, diabetic 32 gauge x 5/32" Ndle Use to inject insulin 4 times daily    PNV no.1/iron,carb/docus/folic (PREN VIT COMB.1-IRON CB-FA-DSS ORAL) Oral    TRUE METRIX GLUCOSE TEST STRIP Strp USE TO TEST BLOOD GLUCOSE FOUR TIMES DAILY    TRUEPLUS LANCETS 30 gauge Misc No dose, route, or frequency recorded.       Care team members:  [place] - Primary OB     OBJECTIVE:   /80 (BP Location: Left " "arm, Patient Position: Sitting)   Ht 5' 7" (1.702 m)   Wt 134.3 kg (296 lb 1.2 oz)   LMP 2021   BMI 46.37 kg/m²     Physical Exam  Deferred    Ultrasound performed. See viewpoint for full ultrasound report.  [place]rich live IUP  Fetal size is appropriate for gestational age, with the EFW (2373 g) plotting at the 34% and the AC plotting at the 73%.   A limited repeat fetal anatomic survey appears normal.   The MVP is normal.   The BPP score is reassuring at 8/8, and the MVP is normal.     Significant labs/imaging:  N/A    ASSESSMENT/PLAN:     31 y.o.  female with IUP at 34w0d    Diabetes mellitus affecting pregnancy in third trimester  Comorbidities: CHTN/Obesity  Prepregnancy regimen:           Metformin  Baseline HgA1c: 5.6  Last A1c: 4.4 ()  Current regimen (as of 2022):              NPH   FETAL ECHO - 3/29 - Elly - WNL  MATERNAL ECHO - pending  Dietary counseling - declined    Blood sugar review:  - per above    Recommendations:   Given sugars values above (elevated fastings, lunch and dinner) we will adjust her regimen:  o NPH /22  o Lispro x/x/10   Hypoglycemia precautions were once again reviewed.   Continue to check BS 4x daily   Send BS log weekly through portal.    Continue Twice weekly PNT     Delivery timing:  o 37 0/7 to 37 and 67 weeks if longstanding diabetes or poorly controlled, polyhydramnios, EFW>90th percentile, or BMI >= 40 (CURRENTLY)  o 36 0/7 to 37 and 6/7 weeks if vascular complications, prior stillbirth or other complicating conditions.  Recommend consideration of earlier delivery if IUGR, HTN, or other complications  Recommend offering  for delivery is EFW is 4500g or more near the time of delivery    Chronic hypertension affecting pregnancy  No symptoms today.  Continue to monitor closely for BP issues or PreE like symptoms.  Care per DM comorbidity.      Patient was counseled that prenatal ultrasound studies have limitations. They do not " detect all fetal, genetic, placental, and maternal abnormalities. A normal appearing prenatal ultrasound is reassuring. However, it does not guarantee the absence of an abnormality or predict a normal outcome for the fetus or the mother.     Patient's other comorbidites were not addressed in today's visit.  If primary OB provider would like MFM input on these, please feel free to reconsult as clinically indicated.  Otherwise, will defer management to primary OB provider      FOLLOW UP: No further ultrasounds or visits were scheduled.      The patient was given an opportunity to ask questions about the management of her high risk pregnancy problems. She expressed an understanding of and agreement to the above impression and plan. All questions were answered to her satisfaction.    40 minutes of total time spent on the encounter, which includes face to face time and non-face to face time preparing to see the patient (eg, review of tests), obtaining and/or reviewing separately obtained history, documenting clinical information in the electronic or other health record, independently interpreting results (not separately reported) and communicating results to the patient/family/caregiver, or care coordination (not separately reported).        Montez Kim MD   Maternal-Fetal Medicine      Electronically Signed by Montez Kim April 20, 2022

## 2022-04-21 NOTE — ASSESSMENT & PLAN NOTE
No symptoms today.  Continue to monitor closely for BP issues or PreE like symptoms.  Care per DM comorbidity.

## 2022-04-22 ENCOUNTER — HOSPITAL ENCOUNTER (OUTPATIENT)
Dept: PERINATAL CARE | Facility: OTHER | Age: 32
Discharge: HOME | End: 2022-04-22
Attending: STUDENT IN AN ORGANIZED HEALTH CARE EDUCATION/TRAINING PROGRAM
Payer: COMMERCIAL

## 2022-04-22 DIAGNOSIS — O99.213 OBESITY AFFECTING PREGNANCY IN THIRD TRIMESTER: ICD-10-CM

## 2022-04-22 DIAGNOSIS — O10.919 CHRONIC HYPERTENSION AFFECTING PREGNANCY: ICD-10-CM

## 2022-04-22 DIAGNOSIS — O24.913 DIABETES MELLITUS AFFECTING PREGNANCY IN THIRD TRIMESTER: ICD-10-CM

## 2022-04-22 PROCEDURE — 59025 PRENATAL TESTING - NST/AFI: ICD-10-PCS | Mod: 26,,, | Performed by: OBSTETRICS & GYNECOLOGY

## 2022-04-22 PROCEDURE — 59025 FETAL NON-STRESS TEST: CPT | Mod: 26,,, | Performed by: OBSTETRICS & GYNECOLOGY

## 2022-04-22 PROCEDURE — 59025 FETAL NON-STRESS TEST: CPT

## 2022-04-25 ENCOUNTER — PATIENT MESSAGE (OUTPATIENT)
Dept: OBSTETRICS AND GYNECOLOGY | Facility: CLINIC | Age: 32
End: 2022-04-25
Payer: COMMERCIAL

## 2022-04-26 ENCOUNTER — TELEPHONE (OUTPATIENT)
Dept: OBSTETRICS AND GYNECOLOGY | Facility: CLINIC | Age: 32
End: 2022-04-26
Payer: COMMERCIAL

## 2022-04-27 ENCOUNTER — PATIENT MESSAGE (OUTPATIENT)
Dept: OBSTETRICS AND GYNECOLOGY | Facility: CLINIC | Age: 32
End: 2022-04-27
Payer: COMMERCIAL

## 2022-04-27 ENCOUNTER — ROUTINE PRENATAL (OUTPATIENT)
Dept: OBSTETRICS AND GYNECOLOGY | Facility: CLINIC | Age: 32
End: 2022-04-27
Payer: COMMERCIAL

## 2022-04-27 ENCOUNTER — HOSPITAL ENCOUNTER (OUTPATIENT)
Dept: PERINATAL CARE | Facility: OTHER | Age: 32
Discharge: HOME | End: 2022-04-27
Attending: STUDENT IN AN ORGANIZED HEALTH CARE EDUCATION/TRAINING PROGRAM
Payer: COMMERCIAL

## 2022-04-27 VITALS — BODY MASS INDEX: 46.54 KG/M2 | DIASTOLIC BLOOD PRESSURE: 68 MMHG | SYSTOLIC BLOOD PRESSURE: 128 MMHG | WEIGHT: 293 LBS

## 2022-04-27 DIAGNOSIS — O10.919 CHRONIC HYPERTENSION AFFECTING PREGNANCY: Primary | ICD-10-CM

## 2022-04-27 DIAGNOSIS — O10.919 CHRONIC HYPERTENSION AFFECTING PREGNANCY: ICD-10-CM

## 2022-04-27 DIAGNOSIS — O99.213 OBESITY AFFECTING PREGNANCY IN THIRD TRIMESTER: ICD-10-CM

## 2022-04-27 DIAGNOSIS — O24.913 DIABETES MELLITUS AFFECTING PREGNANCY IN THIRD TRIMESTER: ICD-10-CM

## 2022-04-27 PROCEDURE — 99999 PR PBB SHADOW E&M-EST. PATIENT-LVL II: ICD-10-PCS | Mod: PBBFAC,,, | Performed by: STUDENT IN AN ORGANIZED HEALTH CARE EDUCATION/TRAINING PROGRAM

## 2022-04-27 PROCEDURE — 0502F SUBSEQUENT PRENATAL CARE: CPT | Mod: CPTII,S$GLB,, | Performed by: STUDENT IN AN ORGANIZED HEALTH CARE EDUCATION/TRAINING PROGRAM

## 2022-04-27 PROCEDURE — 99999 PR PBB SHADOW E&M-EST. PATIENT-LVL II: CPT | Mod: PBBFAC,,, | Performed by: STUDENT IN AN ORGANIZED HEALTH CARE EDUCATION/TRAINING PROGRAM

## 2022-04-27 PROCEDURE — 87081 CULTURE SCREEN ONLY: CPT | Performed by: STUDENT IN AN ORGANIZED HEALTH CARE EDUCATION/TRAINING PROGRAM

## 2022-04-27 PROCEDURE — 76819 FETAL BIOPHYS PROFIL W/O NST: CPT | Mod: 26,,, | Performed by: OBSTETRICS & GYNECOLOGY

## 2022-04-27 PROCEDURE — 76819 FETAL BIOPHYS PROFIL W/O NST: CPT

## 2022-04-27 PROCEDURE — 76819 PRENATAL TESTING - NST/AFI: ICD-10-PCS | Mod: 26,,, | Performed by: OBSTETRICS & GYNECOLOGY

## 2022-04-27 PROCEDURE — 0502F PR SUBSEQUENT PRENATAL CARE: ICD-10-PCS | Mod: CPTII,S$GLB,, | Performed by: STUDENT IN AN ORGANIZED HEALTH CARE EDUCATION/TRAINING PROGRAM

## 2022-04-27 PROCEDURE — 87147 CULTURE TYPE IMMUNOLOGIC: CPT | Performed by: STUDENT IN AN ORGANIZED HEALTH CARE EDUCATION/TRAINING PROGRAM

## 2022-04-27 NOTE — PROGRESS NOTES
Pt doing well.  No complaints. Sleep apnea improved somewhat with propping up on pills. Last night had elevated blood pressure that went down with rest, but took a while. Normal BP today. Has PNT after this.   VS reviewed.  GBS collected, 3 T labs collected on recent admission    RTC weekly

## 2022-04-27 NOTE — PATIENT INSTRUCTIONS
[email or web address]    [facility], Labor and Delivery is on the 6th floor of [facility]: [phone]

## 2022-04-29 ENCOUNTER — HOSPITAL ENCOUNTER (OUTPATIENT)
Dept: PERINATAL CARE | Facility: OTHER | Age: 32
Discharge: HOME | End: 2022-04-29
Attending: STUDENT IN AN ORGANIZED HEALTH CARE EDUCATION/TRAINING PROGRAM
Payer: COMMERCIAL

## 2022-04-29 DIAGNOSIS — O24.913 DIABETES MELLITUS AFFECTING PREGNANCY IN THIRD TRIMESTER: ICD-10-CM

## 2022-04-29 DIAGNOSIS — O99.213 OBESITY AFFECTING PREGNANCY IN THIRD TRIMESTER: ICD-10-CM

## 2022-04-29 DIAGNOSIS — O10.919 CHRONIC HYPERTENSION AFFECTING PREGNANCY: ICD-10-CM

## 2022-04-29 PROBLEM — O99.820 GBS (GROUP B STREPTOCOCCUS CARRIER), +RV CULTURE, CURRENTLY PREGNANT: Status: ACTIVE | Noted: 2022-04-29

## 2022-04-29 LAB — BACTERIA SPEC AEROBE CULT: ABNORMAL

## 2022-04-29 PROCEDURE — 59025 FETAL NON-STRESS TEST: CPT

## 2022-05-02 ENCOUNTER — PATIENT MESSAGE (OUTPATIENT)
Dept: MATERNAL FETAL MEDICINE | Facility: CLINIC | Age: 32
End: 2022-05-02
Payer: COMMERCIAL

## 2022-05-03 ENCOUNTER — ROUTINE PRENATAL (OUTPATIENT)
Dept: OBSTETRICS AND GYNECOLOGY | Facility: CLINIC | Age: 32
End: 2022-05-03
Payer: COMMERCIAL

## 2022-05-03 ENCOUNTER — HOSPITAL ENCOUNTER (OUTPATIENT)
Dept: PERINATAL CARE | Facility: OTHER | Age: 32
Discharge: HOME | End: 2022-05-03
Attending: STUDENT IN AN ORGANIZED HEALTH CARE EDUCATION/TRAINING PROGRAM
Payer: COMMERCIAL

## 2022-05-03 VITALS — DIASTOLIC BLOOD PRESSURE: 88 MMHG | SYSTOLIC BLOOD PRESSURE: 142 MMHG | BODY MASS INDEX: 46.52 KG/M2 | WEIGHT: 293 LBS

## 2022-05-03 DIAGNOSIS — O99.213 OBESITY AFFECTING PREGNANCY IN THIRD TRIMESTER: ICD-10-CM

## 2022-05-03 DIAGNOSIS — O10.919 CHRONIC HYPERTENSION AFFECTING PREGNANCY: ICD-10-CM

## 2022-05-03 DIAGNOSIS — O24.913 DIABETES MELLITUS AFFECTING PREGNANCY IN THIRD TRIMESTER: ICD-10-CM

## 2022-05-03 DIAGNOSIS — O99.820 GBS (GROUP B STREPTOCOCCUS CARRIER), +RV CULTURE, CURRENTLY PREGNANT: Primary | ICD-10-CM

## 2022-05-03 PROCEDURE — 99999 PR PBB SHADOW E&M-EST. PATIENT-LVL III: ICD-10-PCS | Mod: PBBFAC,,, | Performed by: STUDENT IN AN ORGANIZED HEALTH CARE EDUCATION/TRAINING PROGRAM

## 2022-05-03 PROCEDURE — 76819 FETAL BIOPHYS PROFIL W/O NST: CPT | Mod: 26,,, | Performed by: OBSTETRICS & GYNECOLOGY

## 2022-05-03 PROCEDURE — 76819 FETAL BIOPHYS PROFIL W/O NST: CPT

## 2022-05-03 PROCEDURE — 0502F PR SUBSEQUENT PRENATAL CARE: ICD-10-PCS | Mod: CPTII,S$GLB,, | Performed by: STUDENT IN AN ORGANIZED HEALTH CARE EDUCATION/TRAINING PROGRAM

## 2022-05-03 PROCEDURE — 76819 PRENATAL TESTING - NST/AFI: ICD-10-PCS | Mod: 26,,, | Performed by: OBSTETRICS & GYNECOLOGY

## 2022-05-03 PROCEDURE — 99999 PR PBB SHADOW E&M-EST. PATIENT-LVL III: CPT | Mod: PBBFAC,,, | Performed by: STUDENT IN AN ORGANIZED HEALTH CARE EDUCATION/TRAINING PROGRAM

## 2022-05-03 PROCEDURE — 0502F SUBSEQUENT PRENATAL CARE: CPT | Mod: CPTII,S$GLB,, | Performed by: STUDENT IN AN ORGANIZED HEALTH CARE EDUCATION/TRAINING PROGRAM

## 2022-05-03 NOTE — H&P (VIEW-ONLY)
HISTORY AND PHYSICAL                                                OBSTETRICS          Subjective:       Marcie Cheek is a 31 y.o.  female with IUP at 35w6d weeks gestation who presents for routine OB visit. Patient denies vaginal bleeding, contractions, LOF.   Fetal Movement: normal.    This IUP is complicated by RNI, depression/anxiety, obesity, GDM, GBS, cHTN, IUI pregnancy.      PMHx:   Past Medical History:   Diagnosis Date    Anxiety     Depression     Diabetes mellitus affecting pregnancy in third trimester 3/14/2022    Hypertension        PSHx:   Past Surgical History:   Procedure Laterality Date    HYSTEROSCOPY      WISDOM TOOTH EXTRACTION         All: Review of patient's allergies indicates:  No Known Allergies    Meds: (Not in a hospital admission)      SH:   Social History     Socioeconomic History    Marital status:    Tobacco Use    Smoking status: Never Smoker    Smokeless tobacco: Never Used   Substance and Sexual Activity    Alcohol use: Not Currently    Drug use: Never    Sexual activity: Yes     Partners: Male     Social Determinants of Health     Financial Resource Strain: Low Risk     Difficulty of Paying Living Expenses: Not very hard   Food Insecurity: No Food Insecurity    Worried About Running Out of Food in the Last Year: Never true    Ran Out of Food in the Last Year: Never true   Transportation Needs: No Transportation Needs    Lack of Transportation (Medical): No    Lack of Transportation (Non-Medical): No   Physical Activity: Insufficiently Active    Days of Exercise per Week: 2 days    Minutes of Exercise per Session: 20 min   Stress: No Stress Concern Present    Feeling of Stress : Only a little   Social Connections: Unknown    Frequency of Communication with Friends and Family: Twice a week    Frequency of Social Gatherings with Friends and Family: Never    Active Member of Clubs or Organizations: No    Attends Club or Organization  Meetings: Patient refused    Marital Status:    Housing Stability: Low Risk     Unable to Pay for Housing in the Last Year: No    Number of Places Lived in the Last Year: 1    Unstable Housing in the Last Year: No       FH:   Family History   Problem Relation Age of Onset    Thyroid disease Maternal Grandmother     Breast cancer Neg Hx     Colon cancer Neg Hx     Ovarian cancer Neg Hx        OBHx:   OB History    Para Term  AB Living   1 0 0 0 0 0   SAB IAB Ectopic Multiple Live Births   0 0 0 0 0      # Outcome Date GA Lbr [place]/2nd Weight Sex Delivery Anes PTL Lv   1 Current                Objective:       BP (!) 142/88   Wt 134.7 kg (297 lb)   LMP 2021   BMI 46.52 kg/m²     Vitals:    22 1435   BP: (!) 142/88   Weight: 134.7 kg (297 lb)       General:   alert, appears stated age and cooperative, no apparent distress   HENT:  normocephalic, atraumatic   Eyes:  extraocular movements and conjunctivae normal   Neck:  supple, range of motion normal, no thyromegaly   Lungs:   no respiratory distress   Heart:   regular rate   Abdomen:  Nontender, gravid   Extremities positive edema, negative erythema   FHT: verified   Presentations: cephalic by ultrasound   Cervix: declined     Recent Growth Scan:  2373    34% @ 33weeks    Lab Review  Blood Type O POS  GBBS: positive  Rubella: Not immune  RPR: nonreactive  HIV: negative  HepB: negative    Lab Results   Component Value Date    WBC 11.69 2022    HGB 11.9 (L) 2022    HCT 35.7 (L) 2022    MCV 90 2022     2022          Assessment:       35w6d weeks gestation     There are no hospital problems to display for this patient.         Plan:      Risks, benefits, alternatives and possible complications have been discussed in detail with the patient.   - Consents in media  - Pt instructed to present to Labor and Delivery unit at the time of labor or at time given to her by the nurse that will phone her.  4-hour time slot reviewed.   - Covid testing arranged  - Being induction per cervical exam upon arrival    Post-Partum Hemorrhage risk - medium    Holley Hernandez M.D.

## 2022-05-03 NOTE — PATIENT INSTRUCTIONS
[facility], Labor and Delivery is on the 6th floor of [facility]: [phone]    [email or web address]

## 2022-05-04 ENCOUNTER — PATIENT MESSAGE (OUTPATIENT)
Dept: OBSTETRICS AND GYNECOLOGY | Facility: CLINIC | Age: 32
End: 2022-05-04
Payer: COMMERCIAL

## 2022-05-06 ENCOUNTER — HOSPITAL ENCOUNTER (OUTPATIENT)
Dept: PERINATAL CARE | Facility: OTHER | Age: 32
Discharge: HOME | End: 2022-05-06
Attending: STUDENT IN AN ORGANIZED HEALTH CARE EDUCATION/TRAINING PROGRAM
Payer: COMMERCIAL

## 2022-05-06 DIAGNOSIS — O24.913 DIABETES MELLITUS AFFECTING PREGNANCY IN THIRD TRIMESTER: ICD-10-CM

## 2022-05-06 DIAGNOSIS — O10.919 CHRONIC HYPERTENSION AFFECTING PREGNANCY: ICD-10-CM

## 2022-05-06 DIAGNOSIS — O99.213 OBESITY AFFECTING PREGNANCY IN THIRD TRIMESTER: ICD-10-CM

## 2022-05-06 PROCEDURE — 59025 FETAL NON-STRESS TEST: CPT | Mod: 26,,, | Performed by: OBSTETRICS & GYNECOLOGY

## 2022-05-06 PROCEDURE — 59025 FETAL NON-STRESS TEST: CPT

## 2022-05-06 PROCEDURE — 59025 PRENATAL TESTING - NST/AFI: ICD-10-PCS | Mod: 26,,, | Performed by: OBSTETRICS & GYNECOLOGY

## 2022-05-07 ENCOUNTER — ANESTHESIA EVENT (OUTPATIENT)
Dept: OBSTETRICS AND GYNECOLOGY | Facility: OTHER | Age: 32
End: 2022-05-07
Payer: COMMERCIAL

## 2022-05-07 ENCOUNTER — HOSPITAL ENCOUNTER (INPATIENT)
Facility: OTHER | Age: 32
LOS: 3 days | Discharge: HOME | End: 2022-05-10
Attending: OBSTETRICS & GYNECOLOGY | Admitting: OBSTETRICS & GYNECOLOGY
Payer: COMMERCIAL

## 2022-05-07 ENCOUNTER — ANESTHESIA (OUTPATIENT)
Dept: OBSTETRICS AND GYNECOLOGY | Facility: OTHER | Age: 32
End: 2022-05-07
Payer: COMMERCIAL

## 2022-05-07 DIAGNOSIS — Z3A.36 36 WEEKS GESTATION OF PREGNANCY: ICD-10-CM

## 2022-05-07 DIAGNOSIS — O14.93 PRE-ECLAMPSIA IN THIRD TRIMESTER: Primary | ICD-10-CM

## 2022-05-07 LAB
ABO + RH BLD: NORMAL
ALBUMIN SERPL BCP-MCNC: 2.9 G/DL (ref 3.5–5.2)
ALP SERPL-CCNC: 125 U/L (ref 55–135)
ALT SERPL W/O P-5'-P-CCNC: 16 U/L (ref 10–44)
ANION GAP SERPL CALC-SCNC: 11 MMOL/L (ref 8–16)
AST SERPL-CCNC: 52 U/L (ref 10–40)
BASOPHILS # BLD AUTO: 0.01 K/UL (ref 0–0.2)
BASOPHILS NFR BLD: 0.1 % (ref 0–1.9)
BILIRUB SERPL-MCNC: 0.3 MG/DL (ref 0.1–1)
BLD GP AB SCN CELLS X3 SERPL QL: NORMAL
BUN SERPL-MCNC: 11 MG/DL (ref 6–20)
CALCIUM SERPL-MCNC: 9 MG/DL (ref 8.7–10.5)
CHLORIDE SERPL-SCNC: 107 MMOL/L (ref 95–110)
CO2 SERPL-SCNC: 18 MMOL/L (ref 23–29)
CREAT SERPL-MCNC: 0.7 MG/DL (ref 0.5–1.4)
CREAT UR-MCNC: 143.2 MG/DL (ref 15–325)
DIFFERENTIAL METHOD: ABNORMAL
EOSINOPHIL # BLD AUTO: 0.1 K/UL (ref 0–0.5)
EOSINOPHIL NFR BLD: 1 % (ref 0–8)
ERYTHROCYTE [DISTWIDTH] IN BLOOD BY AUTOMATED COUNT: 16 % (ref 11.5–14.5)
EST. GFR  (AFRICAN AMERICAN): >60 ML/MIN/1.73 M^2
EST. GFR  (NON AFRICAN AMERICAN): >60 ML/MIN/1.73 M^2
GLUCOSE SERPL-MCNC: 113 MG/DL (ref 70–110)
HCT VFR BLD AUTO: 31.5 % (ref 37–48.5)
HGB BLD-MCNC: 10.4 G/DL (ref 12–16)
IMM GRANULOCYTES # BLD AUTO: 0.04 K/UL (ref 0–0.04)
IMM GRANULOCYTES NFR BLD AUTO: 0.4 % (ref 0–0.5)
INFLUENZA A, MOLECULAR: NEGATIVE
INFLUENZA B, MOLECULAR: NEGATIVE
LYMPHOCYTES # BLD AUTO: 2.2 K/UL (ref 1–4.8)
LYMPHOCYTES NFR BLD: 23.2 % (ref 18–48)
MCH RBC QN AUTO: 30.1 PG (ref 27–31)
MCHC RBC AUTO-ENTMCNC: 33 G/DL (ref 32–36)
MCV RBC AUTO: 91 FL (ref 82–98)
MONOCYTES # BLD AUTO: 0.6 K/UL (ref 0.3–1)
MONOCYTES NFR BLD: 6.2 % (ref 4–15)
NEUTROPHILS # BLD AUTO: 6.5 K/UL (ref 1.8–7.7)
NEUTROPHILS NFR BLD: 69.1 % (ref 38–73)
NRBC BLD-RTO: 0 /100 WBC
PLATELET # BLD AUTO: 186 K/UL (ref 150–450)
PMV BLD AUTO: 12.4 FL (ref 9.2–12.9)
POCT GLUCOSE: 109 MG/DL (ref 70–110)
POCT GLUCOSE: 80 MG/DL (ref 70–110)
POCT GLUCOSE: 91 MG/DL (ref 70–110)
POCT GLUCOSE: 94 MG/DL (ref 70–110)
POCT GLUCOSE: 99 MG/DL (ref 70–110)
POTASSIUM SERPL-SCNC: 5.6 MMOL/L (ref 3.5–5.1)
PROT SERPL-MCNC: 7.1 G/DL (ref 6–8.4)
PROT UR-MCNC: 135 MG/DL (ref 0–15)
PROT/CREAT UR: 0.94 MG/G{CREAT} (ref 0–0.2)
RBC # BLD AUTO: 3.46 M/UL (ref 4–5.4)
SARS-COV-2 RDRP RESP QL NAA+PROBE: NEGATIVE
SODIUM SERPL-SCNC: 136 MMOL/L (ref 136–145)
SPECIMEN SOURCE: NORMAL
WBC # BLD AUTO: 9.35 K/UL (ref 3.9–12.7)

## 2022-05-07 PROCEDURE — 59025 FETAL NON-STRESS TEST: CPT | Mod: 26,,, | Performed by: OBSTETRICS & GYNECOLOGY

## 2022-05-07 PROCEDURE — 85025 COMPLETE CBC W/AUTO DIFF WBC: CPT | Performed by: STUDENT IN AN ORGANIZED HEALTH CARE EDUCATION/TRAINING PROGRAM

## 2022-05-07 PROCEDURE — 25000003 PHARM REV CODE 250: Performed by: STUDENT IN AN ORGANIZED HEALTH CARE EDUCATION/TRAINING PROGRAM

## 2022-05-07 PROCEDURE — 99284 EMERGENCY DEPT VISIT MOD MDM: CPT | Mod: 25,,, | Performed by: OBSTETRICS & GYNECOLOGY

## 2022-05-07 PROCEDURE — 63600175 PHARM REV CODE 636 W HCPCS: Performed by: STUDENT IN AN ORGANIZED HEALTH CARE EDUCATION/TRAINING PROGRAM

## 2022-05-07 PROCEDURE — U0002 COVID-19 LAB TEST NON-CDC: HCPCS | Performed by: STUDENT IN AN ORGANIZED HEALTH CARE EDUCATION/TRAINING PROGRAM

## 2022-05-07 PROCEDURE — 84156 ASSAY OF PROTEIN URINE: CPT | Performed by: STUDENT IN AN ORGANIZED HEALTH CARE EDUCATION/TRAINING PROGRAM

## 2022-05-07 PROCEDURE — 59200 INSERT CERVICAL DILATOR: CPT

## 2022-05-07 PROCEDURE — 86901 BLOOD TYPING SEROLOGIC RH(D): CPT | Performed by: OBSTETRICS & GYNECOLOGY

## 2022-05-07 PROCEDURE — 11000001 HC ACUTE MED/SURG PRIVATE ROOM

## 2022-05-07 PROCEDURE — 59025 PR FETAL 2N-STRESS TEST: ICD-10-PCS | Mod: 26,,, | Performed by: OBSTETRICS & GYNECOLOGY

## 2022-05-07 PROCEDURE — 80053 COMPREHEN METABOLIC PANEL: CPT | Performed by: STUDENT IN AN ORGANIZED HEALTH CARE EDUCATION/TRAINING PROGRAM

## 2022-05-07 PROCEDURE — 25000003 PHARM REV CODE 250: Performed by: OBSTETRICS & GYNECOLOGY

## 2022-05-07 PROCEDURE — 99285 EMERGENCY DEPT VISIT HI MDM: CPT | Mod: 25

## 2022-05-07 PROCEDURE — 87502 INFLUENZA DNA AMP PROBE: CPT | Performed by: OBSTETRICS & GYNECOLOGY

## 2022-05-07 PROCEDURE — 25000003 PHARM REV CODE 250

## 2022-05-07 PROCEDURE — 59025 FETAL NON-STRESS TEST: CPT

## 2022-05-07 PROCEDURE — 99284 PR EMERGENCY DEPT VISIT,LEVEL IV: ICD-10-PCS | Mod: 25,,, | Performed by: OBSTETRICS & GYNECOLOGY

## 2022-05-07 RX ORDER — LABETALOL HYDROCHLORIDE 5 MG/ML
40 INJECTION, SOLUTION INTRAVENOUS ONCE
Status: COMPLETED | OUTPATIENT
Start: 2022-05-07 | End: 2022-05-07

## 2022-05-07 RX ORDER — OXYTOCIN/RINGER'S LACTATE 30/500 ML
95 PLASTIC BAG, INJECTION (ML) INTRAVENOUS ONCE
Status: COMPLETED | OUTPATIENT
Start: 2022-05-07 | End: 2022-05-08

## 2022-05-07 RX ORDER — CEFAZOLIN SODIUM 2 G/50ML
2 SOLUTION INTRAVENOUS ONCE AS NEEDED
Status: DISCONTINUED | OUTPATIENT
Start: 2022-05-07 | End: 2022-05-09

## 2022-05-07 RX ORDER — SODIUM CHLORIDE 9 MG/ML
INJECTION, SOLUTION INTRAVENOUS
Status: DISCONTINUED | OUTPATIENT
Start: 2022-05-07 | End: 2022-05-09

## 2022-05-07 RX ORDER — BUTALBITAL, ACETAMINOPHEN AND CAFFEINE 50; 325; 40 MG/1; MG/1; MG/1
2 TABLET ORAL ONCE
Status: COMPLETED | OUTPATIENT
Start: 2022-05-07 | End: 2022-05-07

## 2022-05-07 RX ORDER — SODIUM CHLORIDE, SODIUM LACTATE, POTASSIUM CHLORIDE, CALCIUM CHLORIDE 600; 310; 30; 20 MG/100ML; MG/100ML; MG/100ML; MG/100ML
INJECTION, SOLUTION INTRAVENOUS CONTINUOUS
Status: DISCONTINUED | OUTPATIENT
Start: 2022-05-07 | End: 2022-05-10 | Stop reason: HOSPADM

## 2022-05-07 RX ORDER — LABETALOL HYDROCHLORIDE 5 MG/ML
20 INJECTION, SOLUTION INTRAVENOUS ONCE
Status: COMPLETED | OUTPATIENT
Start: 2022-05-07 | End: 2022-05-07

## 2022-05-07 RX ORDER — NIFEDIPINE 10 MG/1
10 CAPSULE ORAL ONCE
Status: COMPLETED | OUTPATIENT
Start: 2022-05-07 | End: 2022-05-07

## 2022-05-07 RX ORDER — FAMOTIDINE 20 MG/1
20 TABLET, FILM COATED ORAL 2 TIMES DAILY
Status: DISCONTINUED | OUTPATIENT
Start: 2022-05-07 | End: 2022-05-07

## 2022-05-07 RX ORDER — CALCIUM GLUCONATE 98 MG/ML
1 INJECTION, SOLUTION INTRAVENOUS
Status: DISCONTINUED | OUTPATIENT
Start: 2022-05-07 | End: 2022-05-10 | Stop reason: HOSPADM

## 2022-05-07 RX ORDER — LABETALOL HYDROCHLORIDE 5 MG/ML
INJECTION, SOLUTION INTRAVENOUS
Status: COMPLETED
Start: 2022-05-07 | End: 2022-05-07

## 2022-05-07 RX ORDER — ESCITALOPRAM OXALATE 10 MG/1
20 TABLET ORAL DAILY
Status: DISCONTINUED | OUTPATIENT
Start: 2022-05-07 | End: 2022-05-10 | Stop reason: HOSPADM

## 2022-05-07 RX ORDER — MAGNESIUM SULFATE HEPTAHYDRATE 40 MG/ML
2 INJECTION, SOLUTION INTRAVENOUS CONTINUOUS
Status: DISCONTINUED | OUTPATIENT
Start: 2022-05-07 | End: 2022-05-09

## 2022-05-07 RX ORDER — TRANEXAMIC ACID 100 MG/ML
1000 INJECTION, SOLUTION INTRAVENOUS ONCE AS NEEDED
Status: DISCONTINUED | OUTPATIENT
Start: 2022-05-07 | End: 2022-05-09

## 2022-05-07 RX ORDER — FAMOTIDINE 20 MG/1
20 TABLET, FILM COATED ORAL NIGHTLY
Status: DISCONTINUED | OUTPATIENT
Start: 2022-05-07 | End: 2022-05-10 | Stop reason: HOSPADM

## 2022-05-07 RX ORDER — OXYTOCIN/RINGER'S LACTATE 30/500 ML
0-30 PLASTIC BAG, INJECTION (ML) INTRAVENOUS CONTINUOUS
Status: DISCONTINUED | OUTPATIENT
Start: 2022-05-07 | End: 2022-05-08

## 2022-05-07 RX ORDER — PROCHLORPERAZINE EDISYLATE 5 MG/ML
5 INJECTION INTRAMUSCULAR; INTRAVENOUS EVERY 6 HOURS PRN
Status: DISCONTINUED | OUTPATIENT
Start: 2022-05-07 | End: 2022-05-09

## 2022-05-07 RX ORDER — SIMETHICONE 80 MG
1 TABLET,CHEWABLE ORAL 4 TIMES DAILY PRN
Status: DISCONTINUED | OUTPATIENT
Start: 2022-05-07 | End: 2022-05-09

## 2022-05-07 RX ORDER — SODIUM CHLORIDE, SODIUM LACTATE, POTASSIUM CHLORIDE, CALCIUM CHLORIDE 600; 310; 30; 20 MG/100ML; MG/100ML; MG/100ML; MG/100ML
INJECTION, SOLUTION INTRAVENOUS CONTINUOUS
Status: DISCONTINUED | OUTPATIENT
Start: 2022-05-07 | End: 2022-05-09

## 2022-05-07 RX ORDER — LABETALOL HYDROCHLORIDE 5 MG/ML
20 INJECTION, SOLUTION INTRAVENOUS ONCE
Status: COMPLETED | OUTPATIENT
Start: 2022-05-07 | End: 2022-05-08

## 2022-05-07 RX ORDER — ONDANSETRON 8 MG/1
8 TABLET, ORALLY DISINTEGRATING ORAL EVERY 8 HOURS PRN
Status: DISCONTINUED | OUTPATIENT
Start: 2022-05-07 | End: 2022-05-09

## 2022-05-07 RX ORDER — CALCIUM CARBONATE 200(500)MG
500 TABLET,CHEWABLE ORAL 3 TIMES DAILY PRN
Status: DISCONTINUED | OUTPATIENT
Start: 2022-05-07 | End: 2022-05-09

## 2022-05-07 RX ORDER — MAGNESIUM SULFATE HEPTAHYDRATE 40 MG/ML
4 INJECTION, SOLUTION INTRAVENOUS ONCE
Status: COMPLETED | OUTPATIENT
Start: 2022-05-07 | End: 2022-05-07

## 2022-05-07 RX ORDER — OXYTOCIN/RINGER'S LACTATE 30/500 ML
334 PLASTIC BAG, INJECTION (ML) INTRAVENOUS ONCE
Status: COMPLETED | OUTPATIENT
Start: 2022-05-07 | End: 2022-05-08

## 2022-05-07 RX ORDER — ACETAMINOPHEN 500 MG
1000 TABLET ORAL ONCE
Status: COMPLETED | OUTPATIENT
Start: 2022-05-07 | End: 2022-05-07

## 2022-05-07 RX ADMIN — LABETALOL HYDROCHLORIDE 40 MG: 5 INJECTION INTRAVENOUS at 03:05

## 2022-05-07 RX ADMIN — ACETAMINOPHEN 1000 MG: 500 TABLET, FILM COATED ORAL at 12:05

## 2022-05-07 RX ADMIN — LABETALOL HYDROCHLORIDE 20 MG: 5 INJECTION INTRAVENOUS at 01:05

## 2022-05-07 RX ADMIN — LABETALOL HYDROCHLORIDE 20 MG: 5 INJECTION INTRAVENOUS at 11:05

## 2022-05-07 RX ADMIN — DEXTROSE 5 MILLION UNITS: 50 INJECTION, SOLUTION INTRAVENOUS at 01:05

## 2022-05-07 RX ADMIN — NIFEDIPINE 10 MG: 10 CAPSULE ORAL at 11:05

## 2022-05-07 RX ADMIN — LABETALOL HYDROCHLORIDE 40 MG: 5 INJECTION INTRAVENOUS at 02:05

## 2022-05-07 RX ADMIN — MISOPROSTOL 50 MCG: 100 TABLET ORAL at 06:05

## 2022-05-07 RX ADMIN — DEXTROSE 3 MILLION UNITS: 50 INJECTION, SOLUTION INTRAVENOUS at 09:05

## 2022-05-07 RX ADMIN — Medication 2 MILLI-UNITS/MIN: at 11:05

## 2022-05-07 RX ADMIN — MAGNESIUM SULFATE HEPTAHYDRATE 4 G: 40 INJECTION, SOLUTION INTRAVENOUS at 12:05

## 2022-05-07 RX ADMIN — FAMOTIDINE 20 MG: 20 TABLET ORAL at 09:05

## 2022-05-07 RX ADMIN — MISOPROSTOL 50 MCG: 100 TABLET ORAL at 01:05

## 2022-05-07 RX ADMIN — BUTALBITAL, ACETAMINOPHEN, AND CAFFEINE 2 TABLET: 50; 325; 40 TABLET ORAL at 06:05

## 2022-05-07 RX ADMIN — LABETALOL HYDROCHLORIDE 20 MG: 5 INJECTION, SOLUTION INTRAVENOUS at 11:05

## 2022-05-07 RX ADMIN — DEXTROSE 3 MILLION UNITS: 50 INJECTION, SOLUTION INTRAVENOUS at 05:05

## 2022-05-07 RX ADMIN — HUMAN INSULIN 22 UNITS: 100 INJECTION, SUSPENSION SUBCUTANEOUS at 08:05

## 2022-05-07 NOTE — PROGRESS NOTES
"LABOR NOTE    S:  MD to bedside for routine cervical exam. Epidural working:  no      O: BP (!) 144/81   Pulse 78   Temp 97.9 °F (36.6 °C) (Temporal)   Resp 20   Ht 5' 7" (1.702 m)   Wt 134.7 kg (297 lb)   LMP 08/25/2021   SpO2 98%   Breastfeeding No   BMI 46.52 kg/m²     FHT: 120 bpm, moderate variability, extremely difficult to assess FHT due to difficulty monitoring patient, appears reassuring when able to monitor  CTX: none  SVE: 2/60/-4, balloon placed without difficulty    TIMELINE:  1200: 1/60/-4  1345: cytotec #1  1745: 2/60/-4, balloon placed    PLAN:    Required labetalol 20/40/40, now mild range BP  No s/s mag toxicity  Mild headache, will give fioricet  Will give second dose of cytotec  Continue Close Maternal/Fetal Monitoring  Recheck 4 hours or PRN      Trupti Torres MD/MPH  OB/GYN PGY2    "

## 2022-05-07 NOTE — INTERVAL H&P NOTE
The patient has been examined and the H&P has been reviewed:    I concur with the findings and no changes have occurred since H&P was written.    Marcie Cheek is a 31 y.o. F at 36w3d presented complaining of severe range blood pressures at home. Upon assessment in [facility], patient noted to have multiple severe range pressures requiring procardia 10. Now diagnosis of cHTN with SI preE w/ SF, meets criteria for admission and delivery. S/p steroids earlier this pregnancy during admission.     SVE: /-4  NST: reactive/reassuring. CTX: none  US: vertex position confirmed     Rupinder Alcala MD  OB/GYN  PGY-4      Active Hospital Problems    Diagnosis  POA    *Pre-eclampsia in third trimester [O14.93]  Yes    GBS (group B Streptococcus carrier), +RV culture, currently pregnant [O99.820]  Not Applicable    Chronic hypertension affecting pregnancy [O10.919]  Yes    Diabetes mellitus affecting pregnancy in third trimester [O24.913]  Yes    Obesity affecting pregnancy in third trimester [O99.213]  Yes    Rubella non-immune status, antepartum [Z28.39]  Not Applicable    Depression affecting pregnancy [O99.340, F32.A]  Yes      Resolved Hospital Problems   No resolved problems to display.

## 2022-05-07 NOTE — ANESTHESIA PREPROCEDURE EVALUATION
[facility]  Anesthesia Pre-Operative Evaluation         Patient Name: Marcie Cheek  YOB: 1990  MRN: [id number]    2022      Marcie Cheek is a 31 y.o. female  @ 36w3d who presents due to cHTN w superimposed PrE w SF. Denies cardiopulmonary, bleeding, or spine disorders.    IUP c/b cHTN (ASA, no meds), DM2 (insulin), morbid obesity (BMI 46.5), GBS, depression/anxiety.     OB History    Para Term  AB Living   1             SAB IAB Ectopic Multiple Live Births                  # Outcome Date GA Lbr Harrison/2nd Weight Sex Delivery Anes PTL Lv   1 Current                Review of patient's allergies indicates:  No Known Allergies    Wt Readings from Last 1 Encounters:   22 1435 134.7 kg (297 lb)       BP Readings from Last 3 Encounters:   22 (!) 143/85   22 (!) 142/88   22 128/68       Patient Active Problem List   Diagnosis    Foot dermatitis    Obesity affecting pregnancy in third trimester    Rubella non-immune status, antepartum    Elevated liver enzymes    Depression affecting pregnancy    Diabetes mellitus affecting pregnancy in third trimester    Chronic hypertension affecting pregnancy    GBS (group B Streptococcus carrier), +RV culture, currently pregnant    Pre-eclampsia in third trimester       Past Surgical History:   Procedure Laterality Date    HYSTEROSCOPY      WISDOM TOOTH EXTRACTION         Social History     Socioeconomic History    Marital status:    Tobacco Use    Smoking status: Never Smoker    Smokeless tobacco: Never Used   Substance and Sexual Activity    Alcohol use: Not Currently    Drug use: Never    Sexual activity: Yes     Partners: Male     Social Determinants of Health     Financial Resource Strain: Low Risk     Difficulty of Paying Living Expenses: Not very hard   Food Insecurity: No Food Insecurity    Worried About Running Out of Food in the Last Year: Never true    Ran  Out of Food in the Last Year: Never true   Transportation Needs: No Transportation Needs    Lack of Transportation (Medical): No    Lack of Transportation (Non-Medical): No   Physical Activity: Insufficiently Active    Days of Exercise per Week: 2 days    Minutes of Exercise per Session: 20 min   Stress: No Stress Concern Present    Feeling of Stress : Only a little   Social Connections: Unknown    Frequency of Communication with Friends and Family: Twice a week    Frequency of Social Gatherings with Friends and Family: Never    Active Member of Clubs or Organizations: No    Attends Club or Organization Meetings: Patient refused    Marital Status:    Housing Stability: Low Risk     Unable to Pay for Housing in the Last Year: No    Number of Places Lived in the Last Year: 1    Unstable Housing in the Last Year: No         Chemistry        Component Value Date/Time     05/07/2022 1142    K 5.6 (H) 05/07/2022 1142     05/07/2022 1142    CO2 18 (L) 05/07/2022 1142    BUN 11 05/07/2022 1142    CREATININE 0.7 05/07/2022 1142     (H) 05/07/2022 1142        Component Value Date/Time    CALCIUM 9.0 05/07/2022 1142    ALKPHOS 125 05/07/2022 1142    AST 52 (H) 05/07/2022 1142    ALT 16 05/07/2022 1142    BILITOT 0.3 05/07/2022 1142    ESTGFRAFRICA >60 05/07/2022 1142    EGFRNONAA >60 05/07/2022 1142            Lab Results   Component Value Date    WBC 9.35 05/07/2022    HGB 10.4 (L) 05/07/2022    HCT 31.5 (L) 05/07/2022    MCV 91 05/07/2022     05/07/2022       No results for input(s): PT, INR, PROTIME, APTT in the last 72 hours.                                                                                                                 05/07/2022  Marcie Cheek is a 31 y.o., female.      Pre-op Assessment    I have reviewed the Patient Summary Reports.     I have reviewed the Nursing Notes.    I have reviewed the Medications.     Review of Systems  Anesthesia Hx:  No  problems with previous Anesthesia Denies Hx of Anesthetic complications    Hematology/Oncology:  Hematology Normal   Oncology Normal     EENT/Dental:EENT/Dental Normal   Cardiovascular:   Hypertension    Pulmonary:  Pulmonary Normal    Renal/:  Renal/ Normal     Hepatic/GI:  Hepatic/GI Normal    Neurological:   Headaches    Endocrine:   Diabetes    Psych:   anxiety depression          Physical Exam  General: Well nourished, Cooperative, Alert and Oriented  Obese  Airway:  Mallampati: III   Mouth Opening: Normal  TM Distance: Normal  Tongue: Normal  Neck ROM: Normal ROM    Dental:  Intact    Chest/Lungs:  Normal Respiratory Rate    Heart:  Rhythm: Regular Rhythm        Anesthesia Plan  Type of Anesthesia, risks & benefits discussed:    Anesthesia Type: Gen ETT, Epidural, Spinal, CSE  Intra-op Monitoring Plan: Standard ASA Monitors  Post Op Pain Control Plan: multimodal analgesia, IV/PO Opioids PRN and epidural analgesia  Informed Consent: Informed consent signed with the Patient and all parties understand the risks and agree with anesthesia plan.  All questions answered.   ASA Score: 3  Day of Surgery Review of History & Physical: H&P Update referred to the surgeon/provider.    Ready For Surgery From Anesthesia Perspective.     .

## 2022-05-07 NOTE — ED PROVIDER NOTES
Encounter Date: 2022       History     Chief Complaint   Patient presents with    Hypertension    Generalized Body Aches    Chills     HPI   Marcie Cheek is a 31 y.o. F at 36w3d presents complaining of elevated BP at home. Reports that her BP was in the 200s. Also reports headache and chills. Denies vision changes, RUQ pain, edema, and chest pain. Reports shortness of breath which has been constant since starting her third trimester. Denies sick contacts. Denies cough and congestion. Denies nausea/vomiting.   This IUP is complicated by RNI, depression/anxiety, obesity, GDM, GBS, cHTN, IUI pregnancy.  Patient denies contractions, denies vaginal bleeding, denies LOF.   Fetal Movement: normal.       Review of patient's allergies indicates:  No Known Allergies  Past Medical History:   Diagnosis Date    Anxiety     Depression     Diabetes mellitus affecting pregnancy in third trimester 3/14/2022    Hypertension      Past Surgical History:   Procedure Laterality Date    HYSTEROSCOPY      WISDOM TOOTH EXTRACTION       Family History   Problem Relation Age of Onset    Thyroid disease Maternal Grandmother     Breast cancer Neg Hx     Colon cancer Neg Hx     Ovarian cancer Neg Hx      Social History     Tobacco Use    Smoking status: Never Smoker    Smokeless tobacco: Never Used   Substance Use Topics    Alcohol use: Not Currently    Drug use: Never     Review of Systems   Constitutional: Positive for chills. Negative for fever.   HENT: Negative for sore throat.    Eyes: Negative for photophobia and visual disturbance.   Respiratory: Negative for shortness of breath.    Cardiovascular: Negative for chest pain.   Gastrointestinal: Negative for abdominal pain and nausea.   Genitourinary: Negative for dysuria, vaginal bleeding, vaginal discharge and vaginal pain.   Musculoskeletal: Negative for back pain.   Skin: Negative for rash.   Neurological: Positive for headaches. Negative for weakness.    Hematological: Does not bruise/bleed easily.       Physical Exam     Initial Vitals [05/07/22 1110]   BP Pulse Resp Temp SpO2   (!) 180/116 108 20 97.6 °F (36.4 °C) 99 %      MAP       --         Physical Exam    Vitals reviewed.  Constitutional: She appears well-developed and well-nourished. She is not diaphoretic. No distress.   HENT:   Head: Normocephalic and atraumatic.   Eyes: Conjunctivae and EOM are normal.   Neck: Neck supple.   Normal range of motion.  Cardiovascular: Normal rate.   Pulmonary/Chest:   Normal work of breathing   Abdominal: Abdomen is soft. There is no abdominal tenderness.   Gravid uterus There is no rebound and no guarding.   Musculoskeletal:         General: Normal range of motion.      Cervical back: Normal range of motion and neck supple.     Neurological: She is alert and oriented to person, place, and time.   Skin: Skin is warm and dry.   Psychiatric: She has a normal mood and affect. Thought content normal.     OB LABOR EXAM:             Dilatation: 1.   Station: -4.   Effacement: 60%.             ED Course   Obtain Fetal nonstress test (NST)    Date/Time: 5/7/2022 8:17 PM  Performed by: Trupti Torres MD  Authorized by: Trupti Torres MD     Nonstress Test:     Variability:  6-25 BPM    Decelerations:  None    Accelerations:  15 bpm    Acoustic Stimulator: No      Baseline:  130    Uterine Irritability: No      Contractions:  Not present  Biophysical Profile:     Nonstress Test Interpretation: reactive      Overall Impression:  Reassuring      Labs Reviewed   COMPREHENSIVE METABOLIC PANEL - Abnormal; Notable for the following components:       Result Value    Potassium 5.6 (*)     CO2 18 (*)     Glucose 113 (*)     Albumin 2.9 (*)     AST 52 (*)     All other components within normal limits   CBC W/ AUTO DIFFERENTIAL - Abnormal; Notable for the following components:    RBC 3.46 (*)     Hemoglobin 10.4 (*)     Hematocrit 31.5 (*)     RDW 16.0 (*)     All other components within  normal limits   PROTEIN / CREATININE RATIO, URINE - Abnormal; Notable for the following components:    Protein, Urine Random 135 (*)     Prot/Creat Ratio, Urine 0.94 (*)     All other components within normal limits    Narrative:     Specimen Source->Urine   INFLUENZA A & B BY MOLECULAR   SARS-COV-2 RNA AMPLIFICATION, QUAL   TYPE & SCREEN          Imaging Results    None          Medications   EScitalopram oxalate tablet 20 mg (20 mg Oral Given 5/8/22 0907)   carboprost injection 250 mcg (has no administration in time range)   methylergonovine injection 200 mcg (has no administration in time range)   miSOPROStoL tablet 800 mcg (has no administration in time range)   diphenoxylate-atropine 2.5-0.025 mg per tablet 1 tablet (has no administration in time range)   calcium gluconate 100 mg/mL (10%) injection 1 g (has no administration in time range)   lactated ringers infusion ( Intravenous Verify Only 5/9/22 0700)   famotidine tablet 20 mg (20 mg Oral Given 5/8/22 2303)   BUPivacaine (PF) 0.25% (2.5 mg/ml) (MARCAINE) 0.25 % (2.5 mg/mL) injection (has no administration in time range)   BUPivacaine (PF) 0.25% (2.5 mg/ml) (MARCAINE) 0.25 % (2.5 mg/mL) injection (has no administration in time range)   magnesium sulfate in water 40 gram/1,000 mL (4 %) infusion (has no administration in time range)   sodium chloride 0.9% flush 2 mL (has no administration in time range)   oxytocin 30 units in 500 mL lactated ringers infusion (non-titrating) (has no administration in time range)   ibuprofen tablet 600 mg (600 mg Oral Given 5/9/22 0544)   HYDROcodone-acetaminophen 5-325 mg per tablet 1 tablet (has no administration in time range)   HYDROcodone-acetaminophen  mg per tablet 1 tablet (has no administration in time range)   lanolin cream (has no administration in time range)   benzocaine-lanolin (DERMOPLAST) topical spray (has no administration in time range)   hydrocortisone 2.5 % rectal cream (has no administration in time  range)   ondansetron disintegrating tablet 8 mg (has no administration in time range)   prochlorperazine injection Soln 5 mg (has no administration in time range)   docusate sodium capsule 200 mg (has no administration in time range)   diphenhydrAMINE capsule 25 mg (has no administration in time range)   diphenhydrAMINE injection 25 mg (has no administration in time range)   acetaminophen tablet 650 mg (has no administration in time range)   measles, mumps and rubella vaccine 1,000-12,500 TCID50/0.5 mL injection 0.5 mL (has no administration in time range)   Tdap vaccine injection 0.5 mL (has no administration in time range)   lanolin cream (has no administration in time range)   enoxaparin injection 40 mg (has no administration in time range)   labetaloL tablet 200 mg (has no administration in time range)   NIFEdipine capsule 10 mg (10 mg Oral Given 5/7/22 1140)   oxytocin 30 units in 500 mL lactated ringers infusion (non-titrating) (334 vanessa-units/min Intravenous Bolus from Bag 5/8/22 2221)   oxytocin 30 units in 500 mL lactated ringers infusion (non-titrating) (95 vanessa-units/min Intravenous Bolus from Bag 5/8/22 2252)   penicillin G potassium 5 Million Units in dextrose 5 % 100 mL IVPB (ready to mix system) (0 Million Units Intravenous Stopped 5/7/22 1407)   magnesium sulfate in water 40 gram/1,000 mL (4 %) bolus from bag 4 g (4 g Intravenous Bolus from Bag 5/7/22 1206)   labetaloL injection 20 mg (20 mg Intravenous Given 5/8/22 0937)   misoprostol split tablet 50 mcg (50 mcg Oral Given 5/7/22 1338)   acetaminophen tablet 1,000 mg (1,000 mg Oral Given 5/7/22 1240)   labetaloL injection 20 mg (20 mg Intravenous Given 5/7/22 1352)   labetaloL injection 40 mg (40 mg Intravenous Given 5/7/22 1413)   labetaloL injection 40 mg (40 mg Intravenous Given 5/7/22 1541)   misoprostol split tablet 50 mcg (50 mcg Oral Given by Other 5/7/22 1824)   butalbital-acetaminophen-caffeine -40 mg per tablet 2 tablet (2 tablets  Oral Given by Other 5/7/22 1824)   labetaloL injection 20 mg (20 mg Intravenous Given 5/7/22 2325)   hydrALAZINE injection 5 mg (5 mg Intravenous Given 5/8/22 0019)   hydrALAZINE injection 10 mg (10 mg Intravenous Given 5/8/22 0054)   guaiFENesin 12 hr tablet 600 mg (600 mg Oral Given 5/8/22 0230)   acetaminophen tablet 1,000 mg (1,000 mg Oral Given 5/8/22 0230)   fentaNYL (SUBLIMAZE) 50 mcg/mL injection (  Override pull for Anesthesia 5/8/22 0502)   fentanyl 2mcg/mL with BUPivacaine 0.1% in sdoium chloride 0.9% Epidural 2 mcg/mL- 0.1 % Soln (  Override pull for Anesthesia 5/8/22 0503)   labetaloL injection 20 mg (20 mg Intravenous Given by Other 5/8/22 1150)   labetaloL injection 40 mg (40 mg Intravenous Given 5/8/22 1433)   miSOPROStoL (CYTOTEC) 100 MCG tablet (400 mcg Rectal Given 5/8/22 2223)   labetaloL injection 20 mg (20 mg Intravenous Given 5/8/22 2230)   labetaloL injection 20 mg (20 mg Intravenous Given 5/9/22 0030)     Medical Decision Making:   ED Management:  - Severe range BP  - Given procardia 10 with no improvement. Labetalol 20 mg ordered, however mild range at time that medication was able to be given. Medication held.  - Tylenol for headache  - Covid neg  - CBC, CMP, P:C ordered  - FHT RR  - Wilmot no ctx  - SVE 1/60/-4  - Bedside US vertex  - Will admit for cHTN w/DIANE w/SF (BP)  - Magnesium for seizure ppx  - S/p BMZ 4/6-7  - Consents in chart  - To L&D for IOL    Other:   I have discussed this case with another health care provider.            Attending Attestation:   Physician Attestation Statement for Resident:  As the supervising MD   Physician Attestation Statement: I have personally seen and examined this patient.   I agree with the above history. -:   As the supervising MD I agree with the above PE.    As the supervising MD I agree with the above treatment, course, plan, and disposition.  I was personally present during the critical portions of the procedure(s) performed by the resident and  was immediately available in the ED to provide services and assistance as needed during the entire procedure.  I have reviewed and agree with the residents interpretation of the following: lab data and rhythm strips.  I have reviewed the following: old records at this facility.                         Clinical Impression:   Final diagnoses:  [O14.93] Pre-eclampsia in third trimester (Primary)  [Z3A.36] 36 weeks gestation of pregnancy          ED Disposition Condition    Send to L&D               Trupti Torres MD  Resident  05/07/22 2019       Sissy Baca MD  05/09/22 0854

## 2022-05-08 PROBLEM — O99.820 GBS (GROUP B STREPTOCOCCUS CARRIER), +RV CULTURE, CURRENTLY PREGNANT: Status: RESOLVED | Noted: 2022-04-29 | Resolved: 2022-05-08

## 2022-05-08 LAB
ALBUMIN SERPL BCP-MCNC: 2.7 G/DL (ref 3.5–5.2)
ALLENS TEST: ABNORMAL
ALP SERPL-CCNC: 129 U/L (ref 55–135)
ALT SERPL W/O P-5'-P-CCNC: 13 U/L (ref 10–44)
ANION GAP SERPL CALC-SCNC: 12 MMOL/L (ref 8–16)
AST SERPL-CCNC: 19 U/L (ref 10–40)
BASOPHILS # BLD AUTO: 0.04 K/UL (ref 0–0.2)
BASOPHILS NFR BLD: 0.3 % (ref 0–1.9)
BILIRUB SERPL-MCNC: 0.6 MG/DL (ref 0.1–1)
BUN SERPL-MCNC: 8 MG/DL (ref 6–20)
CALCIUM SERPL-MCNC: 8.2 MG/DL (ref 8.7–10.5)
CHLORIDE SERPL-SCNC: 105 MMOL/L (ref 95–110)
CO2 SERPL-SCNC: 20 MMOL/L (ref 23–29)
CREAT SERPL-MCNC: 0.7 MG/DL (ref 0.5–1.4)
DIFFERENTIAL METHOD: ABNORMAL
EOSINOPHIL # BLD AUTO: 0 K/UL (ref 0–0.5)
EOSINOPHIL NFR BLD: 0.1 % (ref 0–8)
ERYTHROCYTE [DISTWIDTH] IN BLOOD BY AUTOMATED COUNT: 16.3 % (ref 11.5–14.5)
EST. GFR  (AFRICAN AMERICAN): >60 ML/MIN/1.73 M^2
EST. GFR  (NON AFRICAN AMERICAN): >60 ML/MIN/1.73 M^2
GLUCOSE SERPL-MCNC: 100 MG/DL (ref 70–110)
HCO3 UR-SCNC: 17.8 MMOL/L (ref 24–28)
HCT VFR BLD AUTO: 35.1 % (ref 37–48.5)
HCT VFR BLD CALC: 54 %PCV (ref 36–54)
HGB BLD-MCNC: 11.6 G/DL (ref 12–16)
HGB BLD-MCNC: 18 G/DL
IMM GRANULOCYTES # BLD AUTO: 0.07 K/UL (ref 0–0.04)
IMM GRANULOCYTES NFR BLD AUTO: 0.5 % (ref 0–0.5)
LYMPHOCYTES # BLD AUTO: 2.2 K/UL (ref 1–4.8)
LYMPHOCYTES NFR BLD: 14.4 % (ref 18–48)
MAGNESIUM SERPL-MCNC: 5.3 MG/DL (ref 1.6–2.6)
MCH RBC QN AUTO: 30.4 PG (ref 27–31)
MCHC RBC AUTO-ENTMCNC: 33 G/DL (ref 32–36)
MCV RBC AUTO: 92 FL (ref 82–98)
MONOCYTES # BLD AUTO: 1.2 K/UL (ref 0.3–1)
MONOCYTES NFR BLD: 8.1 % (ref 4–15)
NEUTROPHILS # BLD AUTO: 11.5 K/UL (ref 1.8–7.7)
NEUTROPHILS NFR BLD: 76.6 % (ref 38–73)
NRBC BLD-RTO: 0 /100 WBC
PCO2 BLDA: 33 MMHG (ref 35–45)
PH SMN: 7.34 [PH] (ref 7.35–7.45)
PLATELET # BLD AUTO: 237 K/UL (ref 150–450)
PMV BLD AUTO: 11.9 FL (ref 9.2–12.9)
PO2 BLDA: 27 MMHG (ref 80–100)
POC BE: -8 MMOL/L
POC IONIZED CALCIUM: 1.41 MMOL/L (ref 1.06–1.42)
POC SATURATED O2: 47 % (ref 95–100)
POC TCO2: 19 MMOL/L (ref 23–27)
POCT GLUCOSE: 102 MG/DL (ref 70–110)
POCT GLUCOSE: 104 MG/DL (ref 70–110)
POCT GLUCOSE: 105 MG/DL (ref 70–110)
POCT GLUCOSE: 107 MG/DL (ref 70–110)
POCT GLUCOSE: 109 MG/DL (ref 70–110)
POCT GLUCOSE: 112 MG/DL (ref 70–110)
POCT GLUCOSE: 119 MG/DL (ref 70–110)
POCT GLUCOSE: 119 MG/DL (ref 70–110)
POCT GLUCOSE: 122 MG/DL (ref 70–110)
POCT GLUCOSE: 136 MG/DL (ref 70–110)
POCT GLUCOSE: 136 MG/DL (ref 70–110)
POTASSIUM BLD-SCNC: 4.7 MMOL/L (ref 3.5–5.1)
POTASSIUM SERPL-SCNC: 4.2 MMOL/L (ref 3.5–5.1)
PROT SERPL-MCNC: 6.3 G/DL (ref 6–8.4)
RBC # BLD AUTO: 3.81 M/UL (ref 4–5.4)
SAMPLE: ABNORMAL
SITE: ABNORMAL
SODIUM BLD-SCNC: 131 MMOL/L (ref 136–145)
SODIUM SERPL-SCNC: 137 MMOL/L (ref 136–145)
WBC # BLD AUTO: 15.04 K/UL (ref 3.9–12.7)

## 2022-05-08 PROCEDURE — 83735 ASSAY OF MAGNESIUM: CPT | Performed by: STUDENT IN AN ORGANIZED HEALTH CARE EDUCATION/TRAINING PROGRAM

## 2022-05-08 PROCEDURE — 25000003 PHARM REV CODE 250: Performed by: STUDENT IN AN ORGANIZED HEALTH CARE EDUCATION/TRAINING PROGRAM

## 2022-05-08 PROCEDURE — 80053 COMPREHEN METABOLIC PANEL: CPT | Performed by: OBSTETRICS & GYNECOLOGY

## 2022-05-08 PROCEDURE — 59400 PR FULL ROUT OBSTE CARE,VAGINAL DELIV: ICD-10-PCS | Mod: AT,,, | Performed by: STUDENT IN AN ORGANIZED HEALTH CARE EDUCATION/TRAINING PROGRAM

## 2022-05-08 PROCEDURE — 59400 OBSTETRICAL CARE: CPT | Mod: AT,,, | Performed by: STUDENT IN AN ORGANIZED HEALTH CARE EDUCATION/TRAINING PROGRAM

## 2022-05-08 PROCEDURE — 85025 COMPLETE CBC W/AUTO DIFF WBC: CPT | Performed by: OBSTETRICS & GYNECOLOGY

## 2022-05-08 PROCEDURE — 63600175 PHARM REV CODE 636 W HCPCS: Performed by: STUDENT IN AN ORGANIZED HEALTH CARE EDUCATION/TRAINING PROGRAM

## 2022-05-08 PROCEDURE — 63600175 PHARM REV CODE 636 W HCPCS

## 2022-05-08 PROCEDURE — 25000003 PHARM REV CODE 250

## 2022-05-08 PROCEDURE — 36415 COLL VENOUS BLD VENIPUNCTURE: CPT | Performed by: OBSTETRICS & GYNECOLOGY

## 2022-05-08 PROCEDURE — 99900035 HC TECH TIME PER 15 MIN (STAT)

## 2022-05-08 PROCEDURE — 51702 INSERT TEMP BLADDER CATH: CPT

## 2022-05-08 PROCEDURE — 59409 PRA ETRICAL CARE,VAG DELIV ONLY: ICD-10-PCS | Mod: QY,,, | Performed by: ANESTHESIOLOGY

## 2022-05-08 PROCEDURE — 36416 COLLJ CAPILLARY BLOOD SPEC: CPT

## 2022-05-08 PROCEDURE — C1751 CATH, INF, PER/CENT/MIDLINE: HCPCS | Performed by: ANESTHESIOLOGY

## 2022-05-08 PROCEDURE — 72100003 HC LABOR CARE, EA. ADDL. 8 HRS

## 2022-05-08 PROCEDURE — 25000003 PHARM REV CODE 250: Performed by: OBSTETRICS & GYNECOLOGY

## 2022-05-08 PROCEDURE — 82803 BLOOD GASES ANY COMBINATION: CPT

## 2022-05-08 PROCEDURE — 62326 NJX INTERLAMINAR LMBR/SAC: CPT | Performed by: STUDENT IN AN ORGANIZED HEALTH CARE EDUCATION/TRAINING PROGRAM

## 2022-05-08 PROCEDURE — 27200710 HC EPIDURAL INFUSION PUMP SET: Performed by: ANESTHESIOLOGY

## 2022-05-08 PROCEDURE — 11000001 HC ACUTE MED/SURG PRIVATE ROOM

## 2022-05-08 PROCEDURE — 59409 OBSTETRICAL CARE: CPT | Mod: QY,,, | Performed by: ANESTHESIOLOGY

## 2022-05-08 RX ORDER — LABETALOL HYDROCHLORIDE 5 MG/ML
20 INJECTION, SOLUTION INTRAVENOUS ONCE
Status: COMPLETED | OUTPATIENT
Start: 2022-05-08 | End: 2022-05-08

## 2022-05-08 RX ORDER — FAMOTIDINE 10 MG/ML
20 INJECTION INTRAVENOUS ONCE
Status: DISCONTINUED | OUTPATIENT
Start: 2022-05-08 | End: 2022-05-09

## 2022-05-08 RX ORDER — GUAIFENESIN 600 MG/1
600 TABLET, EXTENDED RELEASE ORAL 2 TIMES DAILY
Status: DISCONTINUED | OUTPATIENT
Start: 2022-05-08 | End: 2022-05-08

## 2022-05-08 RX ORDER — SODIUM CITRATE AND CITRIC ACID MONOHYDRATE 334; 500 MG/5ML; MG/5ML
30 SOLUTION ORAL ONCE
Status: DISCONTINUED | OUTPATIENT
Start: 2022-05-08 | End: 2022-05-09

## 2022-05-08 RX ORDER — HYDRALAZINE HYDROCHLORIDE 20 MG/ML
5 INJECTION INTRAMUSCULAR; INTRAVENOUS ONCE
Status: COMPLETED | OUTPATIENT
Start: 2022-05-08 | End: 2022-05-08

## 2022-05-08 RX ORDER — OXYTOCIN/RINGER'S LACTATE 30/500 ML
0-40 PLASTIC BAG, INJECTION (ML) INTRAVENOUS CONTINUOUS
Status: DISCONTINUED | OUTPATIENT
Start: 2022-05-08 | End: 2022-05-09

## 2022-05-08 RX ORDER — LABETALOL HYDROCHLORIDE 5 MG/ML
40 INJECTION, SOLUTION INTRAVENOUS ONCE
Status: COMPLETED | OUTPATIENT
Start: 2022-05-08 | End: 2022-05-08

## 2022-05-08 RX ORDER — CARBOPROST TROMETHAMINE 250 UG/ML
INJECTION, SOLUTION INTRAMUSCULAR
Status: DISCONTINUED
Start: 2022-05-08 | End: 2022-05-08 | Stop reason: WASHOUT

## 2022-05-08 RX ORDER — LABETALOL HCL 20 MG/4 ML
10 SYRINGE (ML) INTRAVENOUS ONCE
Status: DISCONTINUED | OUTPATIENT
Start: 2022-05-08 | End: 2022-05-08

## 2022-05-08 RX ORDER — BUPIVACAINE HYDROCHLORIDE 2.5 MG/ML
INJECTION, SOLUTION EPIDURAL; INFILTRATION; INTRACAUDAL
Status: DISPENSED
Start: 2022-05-08 | End: 2022-05-08

## 2022-05-08 RX ORDER — FENTANYL/BUPIVACAINE/NS/PF 2MCG/ML-.1
PLASTIC BAG, INJECTION (ML) INJECTION
Status: COMPLETED
Start: 2022-05-08 | End: 2022-05-08

## 2022-05-08 RX ORDER — BUPIVACAINE HYDROCHLORIDE 2.5 MG/ML
INJECTION, SOLUTION EPIDURAL; INFILTRATION; INTRACAUDAL
Status: DISPENSED
Start: 2022-05-08 | End: 2022-05-09

## 2022-05-08 RX ORDER — ACETAMINOPHEN 500 MG
1000 TABLET ORAL ONCE
Status: COMPLETED | OUTPATIENT
Start: 2022-05-08 | End: 2022-05-08

## 2022-05-08 RX ORDER — METHYLERGONOVINE MALEATE 0.2 MG/ML
INJECTION INTRAVENOUS
Status: DISCONTINUED
Start: 2022-05-08 | End: 2022-05-08 | Stop reason: WASHOUT

## 2022-05-08 RX ORDER — FENTANYL/BUPIVACAINE/NS/PF 2MCG/ML-.1
PLASTIC BAG, INJECTION (ML) INJECTION CONTINUOUS PRN
Status: DISCONTINUED | OUTPATIENT
Start: 2022-05-08 | End: 2022-05-08

## 2022-05-08 RX ORDER — FENTANYL CITRATE 50 UG/ML
INJECTION, SOLUTION INTRAMUSCULAR; INTRAVENOUS
Status: COMPLETED
Start: 2022-05-08 | End: 2022-05-08

## 2022-05-08 RX ORDER — MISOPROSTOL 100 UG/1
TABLET ORAL
Status: COMPLETED
Start: 2022-05-08 | End: 2022-05-08

## 2022-05-08 RX ORDER — HYDRALAZINE HYDROCHLORIDE 20 MG/ML
10 INJECTION INTRAMUSCULAR; INTRAVENOUS ONCE
Status: COMPLETED | OUTPATIENT
Start: 2022-05-08 | End: 2022-05-08

## 2022-05-08 RX ORDER — GUAIFENESIN 600 MG/1
600 TABLET, EXTENDED RELEASE ORAL ONCE
Status: COMPLETED | OUTPATIENT
Start: 2022-05-08 | End: 2022-05-08

## 2022-05-08 RX ORDER — FENTANYL/BUPIVACAINE/NS/PF 2MCG/ML-.1
PLASTIC BAG, INJECTION (ML) INJECTION CONTINUOUS
Status: DISCONTINUED | OUTPATIENT
Start: 2022-05-08 | End: 2022-05-09

## 2022-05-08 RX ORDER — FENTANYL CITRATE 50 UG/ML
INJECTION, SOLUTION INTRAMUSCULAR; INTRAVENOUS
Status: DISCONTINUED | OUTPATIENT
Start: 2022-05-08 | End: 2022-05-08

## 2022-05-08 RX ORDER — LIDOCAINE HYDROCHLORIDE AND EPINEPHRINE 15; 5 MG/ML; UG/ML
INJECTION, SOLUTION EPIDURAL
Status: DISCONTINUED | OUTPATIENT
Start: 2022-05-08 | End: 2022-05-08

## 2022-05-08 RX ADMIN — Medication 10 ML/HR: at 04:05

## 2022-05-08 RX ADMIN — LABETALOL HYDROCHLORIDE 40 MG: 5 INJECTION INTRAVENOUS at 02:05

## 2022-05-08 RX ADMIN — HYDRALAZINE HYDROCHLORIDE 5 MG: 20 INJECTION INTRAMUSCULAR; INTRAVENOUS at 12:05

## 2022-05-08 RX ADMIN — HUMAN INSULIN 22 UNITS: 100 INJECTION, SUSPENSION SUBCUTANEOUS at 09:05

## 2022-05-08 RX ADMIN — HUMAN INSULIN 22 UNITS: 100 INJECTION, SUSPENSION SUBCUTANEOUS at 10:05

## 2022-05-08 RX ADMIN — GUAIFENESIN 600 MG: 600 TABLET, EXTENDED RELEASE ORAL at 02:05

## 2022-05-08 RX ADMIN — MAGNESIUM SULFATE IN WATER 2 G/HR: 40 INJECTION, SOLUTION INTRAVENOUS at 05:05

## 2022-05-08 RX ADMIN — FENTANYL CITRATE 100 MCG: 50 INJECTION, SOLUTION INTRAMUSCULAR; INTRAVENOUS at 04:05

## 2022-05-08 RX ADMIN — DEXTROSE 3 MILLION UNITS: 50 INJECTION, SOLUTION INTRAVENOUS at 01:05

## 2022-05-08 RX ADMIN — LABETALOL HYDROCHLORIDE 20 MG: 5 INJECTION INTRAVENOUS at 10:05

## 2022-05-08 RX ADMIN — Medication 32 MILLI-UNITS/MIN: at 07:05

## 2022-05-08 RX ADMIN — CALCIUM CARBONATE (ANTACID) CHEW TAB 500 MG 500 MG: 500 CHEW TAB at 04:05

## 2022-05-08 RX ADMIN — ACETAMINOPHEN 1000 MG: 500 TABLET, FILM COATED ORAL at 02:05

## 2022-05-08 RX ADMIN — LIDOCAINE HYDROCHLORIDE,EPINEPHRINE BITARTRATE 3 ML: 15; .005 INJECTION, SOLUTION EPIDURAL; INFILTRATION; INTRACAUDAL; PERINEURAL at 04:05

## 2022-05-08 RX ADMIN — Medication 95 MILLI-UNITS/MIN: at 10:05

## 2022-05-08 RX ADMIN — LABETALOL HYDROCHLORIDE 20 MG: 5 INJECTION INTRAVENOUS at 09:05

## 2022-05-08 RX ADMIN — DEXTROSE 3 MILLION UNITS: 50 INJECTION, SOLUTION INTRAVENOUS at 09:05

## 2022-05-08 RX ADMIN — DEXTROSE 3 MILLION UNITS: 50 INJECTION, SOLUTION INTRAVENOUS at 05:05

## 2022-05-08 RX ADMIN — CALCIUM CARBONATE (ANTACID) CHEW TAB 500 MG 500 MG: 500 CHEW TAB at 07:05

## 2022-05-08 RX ADMIN — Medication 334 MILLI-UNITS/MIN: at 10:05

## 2022-05-08 RX ADMIN — ESCITALOPRAM OXALATE 20 MG: 10 TABLET ORAL at 09:05

## 2022-05-08 RX ADMIN — BUPIVACAINE HYDROCHLORIDE 5 ML: 2.5 INJECTION, SOLUTION EPIDURAL; INFILTRATION; INTRACAUDAL; PERINEURAL at 04:05

## 2022-05-08 RX ADMIN — Medication 250 ML: at 06:05

## 2022-05-08 RX ADMIN — MISOPROSTOL 400 MCG: 100 TABLET ORAL at 10:05

## 2022-05-08 RX ADMIN — SODIUM CHLORIDE, SODIUM LACTATE, POTASSIUM CHLORIDE, AND CALCIUM CHLORIDE 1000 ML: .6; .31; .03; .02 INJECTION, SOLUTION INTRAVENOUS at 07:05

## 2022-05-08 RX ADMIN — HYDRALAZINE HYDROCHLORIDE 10 MG: 20 INJECTION INTRAMUSCULAR; INTRAVENOUS at 12:05

## 2022-05-08 RX ADMIN — LABETALOL HYDROCHLORIDE 20 MG: 5 INJECTION INTRAVENOUS at 11:05

## 2022-05-08 RX ADMIN — FAMOTIDINE 20 MG: 20 TABLET ORAL at 11:05

## 2022-05-08 NOTE — PROGRESS NOTES
"LABOR NOTE    S:  MD to bedside for routine cervical exam. Epidural working:  Yes  MD to bedside for routine cervical examination     O: BP (!) 159/84   Pulse 90   Temp 97.2 °F (36.2 °C)   Resp 20   Ht 5' 7" (1.702 m)   Wt 134.7 kg (297 lb)   LMP 08/25/2021   SpO2 98%   Breastfeeding No   BMI 46.52 kg/m²     FHT: 125, +accels, no decels, moderate btbv, Cat 2 tracing   CTX: q1-3  SVE: 4/70/-2    TIMELINE:  1200: 1/60/-4  1345: cytotec #1  1745: 2/60/-4, balloon placed  2245: 3/60/-2, AROM clr, FSE/IUPC placed  0515: 4/70/-2, pit @ 14 mU/min MVUs 150s-160s     PLAN:  1. Induction of labor  - Continue close maternal/fetal monitoring  - Titrate pitocin increases as possible   - Recheck in 4 hours or PRN    2. PreE w/ SF (BP)  - Asymptomatic   - No s/s mag toxicity  - UOP: adequate  - DTR 2+, lungs clear to auscultation  - Continue close blood pressure monitoring    Rupinder Alcala MD  OB/GYN  PGY-4    "

## 2022-05-08 NOTE — CARE UPDATE
Resident to bedside to replace FSE. SVE 5/80/-2. IUPC replaced at that time. Pitocin at 30 mU/min.     Mel Swann MD/MPH  OB/GYN PGY-1   [facility]

## 2022-05-08 NOTE — ANESTHESIA PROCEDURE NOTES
Epidural    Patient location during procedure: OB   Reason for block: primary anesthetic   Reason for block: labor analgesia requested by patient and obstetrician  Diagnosis: IUP   Start time: 5/8/2022 5:46 PM  Timeout: 5/8/2022 5:45 PM  End time: 5/8/2022 5:55 PM  Surgery related to: Vaginal Delivery    Staffing  Performing Provider: Naina Monae MD  Authorizing Provider: Stacy Hernandez MD        Preanesthetic Checklist  Completed: patient identified, IV checked, site marked, risks and benefits discussed, surgical consent, monitors and equipment checked, pre-op evaluation, timeout performed, anesthesia consent given, hand hygiene performed and patient being monitored  Preparation  Patient position: sitting  Prep: ChloraPrep  Patient monitoring: ECG, Pulse Ox and Blood Pressure  Reason for block: primary anesthetic   Epidural  Skin Anesthetic: lidocaine 1%  Skin Wheal: 3 mL  Administration type: continuous  Approach: midline  Interspace: L4-5    Injection technique: ZAIN air  Needle and Epidural Catheter  Needle type: Tuohy   Needle gauge: 17  Needle length: 3.5 inches  Needle insertion depth: 8 cm  Catheter type: [email or web address]  Catheter size: 19 G  Catheter at skin depth: 12 cm  Insertion Attempts: 1  Test dose: 3 mL of lidocaine 1.5% with Epi 1-to-200,000  Additional Documentation: incremental injection, negative aspiration for heme and CSF, no paresthesia on injection, no signs/symptoms of IV or SA injection, no significant pain on injection and no significant complaints from patient  Needle localization: anatomical landmarks  Medications:  Volume per aspiration: 5 mL  Time between aspirations: 5 minutes   Assessment  Ease of block: easy  Patient's tolerance of the procedure: comfortable throughout block and no complaints No inadvertent dural puncture with Tuohy.  Dural puncture performed with spinal needle.

## 2022-05-08 NOTE — PROGRESS NOTES
"LABOR NOTE    S:  MD to bedside for routine cervical exam. Epidural working:  Yes  MD to bedside for routine cervical examination     O: BP (!) 142/74   Pulse 91   Temp 96.8 °F (36 °C) (Temporal)   Resp 18   Ht 5' 7" (1.702 m)   Wt 134.7 kg (297 lb)   LMP 08/25/2021   SpO2 98%   Breastfeeding No   BMI 46.52 kg/m²     FHT: 135, +accels, no decels, moderate btbv, Cat 2 tracing   CTX: q1-3  SVE: 4/70/-2    TIMELINE:  1200: 1/60/-4  1345: cytotec #1  1745: 2/60/-4, balloon placed  2245: 3/60/-2, AROM clr, FSE/IUPC placed  0515: 4/70/-2, pit @ 14 mU/min MVUs 150s-160s   0915: 4/70/-2, pit at 24 mU/min MVUs 120-150s     PLAN:  1. Induction of labor  - Continue close maternal/fetal monitoring  - Titrate pitocin increases as possible   - Recheck in 4 hours or PRN    2. PreE w/ SF (BP)  - Two sustained severe range BP   - Will give labetalol 20 mg, retake BP in 15 minutes   - No s/s mag toxicity  - UOP: adequate  - DTR 2+, lungs clear to auscultation  - Continue close blood pressure monitoring    Mel Swann MD/MPH  OB/GYN PGY-1   [facility]     "

## 2022-05-08 NOTE — PROGRESS NOTES
"LABOR NOTE    S:  MD to bedside for routine cervical exam. Epidural working:  Not applicable    O: BP (!) 147/81   Pulse 90   Temp 98 °F (36.7 °C) (Temporal)   Resp 20   Ht 5' 7" (1.702 m)   Wt 134.7 kg (297 lb)   LMP 08/25/2021   SpO2 98%   Breastfeeding No   BMI 46.52 kg/m²     FHT: 135 bpm, moderate variability, extremely difficult to assess FHT due to difficulty monitoring patient, appears reassuring when able to monitor  CTX: none  SVE: 3/60/-2  AROM clr, FSE and IUPC placed    TIMELINE:  1200: 1/60/-4  1345: cytotec #1  1745: 2/60/-4, balloon placed  2245: 3/60/-2, AROM clr, FSE/IUPC placed    PLAN:  1. Induction of labor  - Continue close maternal/fetal monitoring  - Start pitocin  - Recheck in 4 hours or PRN    2. PreE w/ SF (BP)  - Asymptomatic   - Required labetalol 20/40/40, now mild range BP  - No s/s mag toxicity  - UOP: adequate  - DTR 2+, lungs clear to auscultation  - Continue close blood pressure monitoring    Rupinder Alcala MD  OB/GYN  PGY-4    "

## 2022-05-08 NOTE — PROGRESS NOTES
"LABOR NOTE    S:  MD to bedside for routine cervical exam. Epidural working:  Yes  MD to bedside for routine cervical examination     O: /61   Pulse 80   Temp 97.7 °F (36.5 °C) (Axillary)   Resp 18   Ht 5' 7" (1.702 m)   Wt 134.7 kg (297 lb)   LMP 08/25/2021   SpO2 95%   Breastfeeding No   BMI 46.52 kg/m²     FHT: 135, +accels, no decels, moderate btbv, Cat 2 tracing   CTX: q3  SVE: 4/70/-2    TIMELINE:  1200: 1/60/-4  1345: cytotec #1  1745: 2/60/-4, balloon placed  2245: 3/60/-2, AROM clr, FSE/IUPC placed  0515: 4/70/-2, pit @ 14 mU/min MVUs 150s-160s   0915: 4/70/-2, pit at 24 mU/min MVUs 120-150s   1430: 5/80-2, Pit at 30  1845: 7/80/-1, pit @ 30, MVU 130s    PLAN:  1. Induction of labor  - Continue close maternal/fetal monitoring  - Titrate pitocin increases as possible   - Recheck in 2 hours or PRN    2. PreE w/ SF (BP)   - BP's mild range  - No s/s mag toxicity  - UOP: adequate  - DTR 2+, lungs clear to auscultation  - Continue close blood pressure monitoring    Holley Hernandez M.D.  "

## 2022-05-08 NOTE — ANESTHESIA PROCEDURE NOTES
Epidural    Patient location during procedure: OB   Reason for block: primary anesthetic   Reason for block: labor analgesia requested by patient and obstetrician  Diagnosis: IUP   Start time: 5/8/2022 4:37 AM  Timeout: 5/8/2022 4:34 AM  End time: 5/8/2022 4:51 AM  Surgery related to: Vaginal Delivery    Staffing  Performing Provider: Joshua Trinh MD  Authorizing Provider: Gage Angela MD        Preanesthetic Checklist  Completed: patient identified, IV checked, site marked, risks and benefits discussed, surgical consent, monitors and equipment checked, pre-op evaluation, timeout performed, anesthesia consent given, hand hygiene performed and patient being monitored  Preparation  Patient position: sitting  Prep: ChloraPrep  Patient monitoring: ECG, Pulse Ox and Blood Pressure  Reason for block: primary anesthetic   Epidural  Skin Anesthetic: lidocaine 1%  Skin Wheal: 3 mL  Administration type: continuous  Approach: midline  Interspace: L4-5    Injection technique: ZAIN air  Needle and Epidural Catheter  Needle type: Tuohy   Needle gauge: 17  Needle length: 3.5 inches  Needle insertion depth: 7.5 cm  Catheter type: [employer]  Catheter size: 19 G  Catheter at skin depth: 12 cm  Insertion Attempts: 1  Test dose: 3 mL of lidocaine 1.5% with Epi 1-to-200,000  Additional Documentation: incremental injection, negative aspiration for heme and CSF, no paresthesia on injection, no signs/symptoms of IV or SA injection, no significant pain on injection and no significant complaints from patient  Needle localization: anatomical landmarks  Medications:  Volume per aspiration: 5 mL  Time between aspirations: 5 minutes   Assessment  Ease of block: easy  Patient's tolerance of the procedure: comfortable throughout block and no complaints No inadvertent dural puncture with Tuohy.  Dural puncture performed with spinal needle.

## 2022-05-09 ENCOUNTER — PATIENT MESSAGE (OUTPATIENT)
Dept: BEHAVIORAL HEALTH | Facility: CLINIC | Age: 32
End: 2022-05-09
Payer: COMMERCIAL

## 2022-05-09 LAB
BASOPHILS # BLD AUTO: 0.03 K/UL (ref 0–0.2)
BASOPHILS NFR BLD: 0.2 % (ref 0–1.9)
DIFFERENTIAL METHOD: ABNORMAL
EOSINOPHIL # BLD AUTO: 0 K/UL (ref 0–0.5)
EOSINOPHIL NFR BLD: 0.1 % (ref 0–8)
ERYTHROCYTE [DISTWIDTH] IN BLOOD BY AUTOMATED COUNT: 16.8 % (ref 11.5–14.5)
HCT VFR BLD AUTO: 32.4 % (ref 37–48.5)
HGB BLD-MCNC: 10.6 G/DL (ref 12–16)
IMM GRANULOCYTES # BLD AUTO: 0.09 K/UL (ref 0–0.04)
IMM GRANULOCYTES NFR BLD AUTO: 0.5 % (ref 0–0.5)
LYMPHOCYTES # BLD AUTO: 2.8 K/UL (ref 1–4.8)
LYMPHOCYTES NFR BLD: 16.2 % (ref 18–48)
MCH RBC QN AUTO: 30.2 PG (ref 27–31)
MCHC RBC AUTO-ENTMCNC: 32.7 G/DL (ref 32–36)
MCV RBC AUTO: 92 FL (ref 82–98)
MONOCYTES # BLD AUTO: 1.9 K/UL (ref 0.3–1)
MONOCYTES NFR BLD: 11.2 % (ref 4–15)
NEUTROPHILS # BLD AUTO: 12.2 K/UL (ref 1.8–7.7)
NEUTROPHILS NFR BLD: 71.8 % (ref 38–73)
NRBC BLD-RTO: 0 /100 WBC
PLATELET # BLD AUTO: 229 K/UL (ref 150–450)
PMV BLD AUTO: 11.9 FL (ref 9.2–12.9)
POCT GLUCOSE: 76 MG/DL (ref 70–110)
POCT GLUCOSE: 82 MG/DL (ref 70–110)
POCT GLUCOSE: 87 MG/DL (ref 70–110)
RBC # BLD AUTO: 3.51 M/UL (ref 4–5.4)
WBC # BLD AUTO: 17.02 K/UL (ref 3.9–12.7)

## 2022-05-09 PROCEDURE — 25000003 PHARM REV CODE 250: Performed by: STUDENT IN AN ORGANIZED HEALTH CARE EDUCATION/TRAINING PROGRAM

## 2022-05-09 PROCEDURE — 63600175 PHARM REV CODE 636 W HCPCS: Performed by: STUDENT IN AN ORGANIZED HEALTH CARE EDUCATION/TRAINING PROGRAM

## 2022-05-09 PROCEDURE — 72100002 HC LABOR CARE, 1ST 8 HOURS

## 2022-05-09 PROCEDURE — 72100003 HC LABOR CARE, EA. ADDL. 8 HRS

## 2022-05-09 PROCEDURE — 11000001 HC ACUTE MED/SURG PRIVATE ROOM

## 2022-05-09 PROCEDURE — 72200005 HC VAGINAL DELIVERY LEVEL II

## 2022-05-09 PROCEDURE — 85025 COMPLETE CBC W/AUTO DIFF WBC: CPT | Performed by: STUDENT IN AN ORGANIZED HEALTH CARE EDUCATION/TRAINING PROGRAM

## 2022-05-09 PROCEDURE — 36415 COLL VENOUS BLD VENIPUNCTURE: CPT | Performed by: STUDENT IN AN ORGANIZED HEALTH CARE EDUCATION/TRAINING PROGRAM

## 2022-05-09 RX ORDER — MAGNESIUM SULFATE HEPTAHYDRATE 40 MG/ML
2 INJECTION, SOLUTION INTRAVENOUS CONTINUOUS
Status: DISCONTINUED | OUTPATIENT
Start: 2022-05-09 | End: 2022-05-09

## 2022-05-09 RX ORDER — ONDANSETRON 8 MG/1
8 TABLET, ORALLY DISINTEGRATING ORAL EVERY 8 HOURS PRN
Status: DISCONTINUED | OUTPATIENT
Start: 2022-05-09 | End: 2022-05-10 | Stop reason: HOSPADM

## 2022-05-09 RX ORDER — LABETALOL 200 MG/1
200 TABLET, FILM COATED ORAL EVERY 12 HOURS
Status: DISCONTINUED | OUTPATIENT
Start: 2022-05-09 | End: 2022-05-10 | Stop reason: HOSPADM

## 2022-05-09 RX ORDER — ENOXAPARIN SODIUM 100 MG/ML
40 INJECTION SUBCUTANEOUS EVERY 12 HOURS
Status: DISCONTINUED | OUTPATIENT
Start: 2022-05-09 | End: 2022-05-10 | Stop reason: HOSPADM

## 2022-05-09 RX ORDER — DIPHENHYDRAMINE HCL 25 MG
25 CAPSULE ORAL EVERY 4 HOURS PRN
Status: DISCONTINUED | OUTPATIENT
Start: 2022-05-09 | End: 2022-05-10 | Stop reason: HOSPADM

## 2022-05-09 RX ORDER — METHYLERGONOVINE MALEATE 0.2 MG/ML
200 INJECTION INTRAVENOUS
Status: DISCONTINUED | OUTPATIENT
Start: 2022-05-09 | End: 2022-05-10 | Stop reason: HOSPADM

## 2022-05-09 RX ORDER — HYDROCODONE BITARTRATE AND ACETAMINOPHEN 10; 325 MG/1; MG/1
1 TABLET ORAL EVERY 4 HOURS PRN
Status: DISCONTINUED | OUTPATIENT
Start: 2022-05-09 | End: 2022-05-10 | Stop reason: HOSPADM

## 2022-05-09 RX ORDER — IBUPROFEN 600 MG/1
600 TABLET ORAL EVERY 6 HOURS
Status: DISCONTINUED | OUTPATIENT
Start: 2022-05-09 | End: 2022-05-10 | Stop reason: HOSPADM

## 2022-05-09 RX ORDER — ACETAMINOPHEN 325 MG/1
650 TABLET ORAL EVERY 6 HOURS PRN
Status: DISCONTINUED | OUTPATIENT
Start: 2022-05-09 | End: 2022-05-10 | Stop reason: HOSPADM

## 2022-05-09 RX ORDER — OXYTOCIN/RINGER'S LACTATE 30/500 ML
95 PLASTIC BAG, INJECTION (ML) INTRAVENOUS ONCE
Status: DISCONTINUED | OUTPATIENT
Start: 2022-05-09 | End: 2022-05-10 | Stop reason: HOSPADM

## 2022-05-09 RX ORDER — HYDROCORTISONE 25 MG/G
CREAM TOPICAL 3 TIMES DAILY PRN
Status: DISCONTINUED | OUTPATIENT
Start: 2022-05-09 | End: 2022-05-10 | Stop reason: HOSPADM

## 2022-05-09 RX ORDER — DIPHENHYDRAMINE HYDROCHLORIDE 50 MG/ML
25 INJECTION INTRAMUSCULAR; INTRAVENOUS EVERY 4 HOURS PRN
Status: DISCONTINUED | OUTPATIENT
Start: 2022-05-09 | End: 2022-05-10 | Stop reason: HOSPADM

## 2022-05-09 RX ORDER — MISOPROSTOL 200 UG/1
800 TABLET ORAL
Status: DISCONTINUED | OUTPATIENT
Start: 2022-05-09 | End: 2022-05-10 | Stop reason: HOSPADM

## 2022-05-09 RX ORDER — DIPHENOXYLATE HYDROCHLORIDE AND ATROPINE SULFATE 2.5; .025 MG/1; MG/1
1 TABLET ORAL 4 TIMES DAILY PRN
Status: DISCONTINUED | OUTPATIENT
Start: 2022-05-09 | End: 2022-05-10 | Stop reason: HOSPADM

## 2022-05-09 RX ORDER — DOCUSATE SODIUM 100 MG/1
200 CAPSULE, LIQUID FILLED ORAL 2 TIMES DAILY PRN
Status: DISCONTINUED | OUTPATIENT
Start: 2022-05-09 | End: 2022-05-10 | Stop reason: HOSPADM

## 2022-05-09 RX ORDER — CARBOPROST TROMETHAMINE 250 UG/ML
250 INJECTION, SOLUTION INTRAMUSCULAR
Status: DISCONTINUED | OUTPATIENT
Start: 2022-05-09 | End: 2022-05-10 | Stop reason: HOSPADM

## 2022-05-09 RX ORDER — LABETALOL HYDROCHLORIDE 5 MG/ML
20 INJECTION, SOLUTION INTRAVENOUS ONCE
Status: COMPLETED | OUTPATIENT
Start: 2022-05-09 | End: 2022-05-09

## 2022-05-09 RX ORDER — PROCHLORPERAZINE EDISYLATE 5 MG/ML
5 INJECTION INTRAMUSCULAR; INTRAVENOUS EVERY 6 HOURS PRN
Status: DISCONTINUED | OUTPATIENT
Start: 2022-05-09 | End: 2022-05-10 | Stop reason: HOSPADM

## 2022-05-09 RX ORDER — HYDROCODONE BITARTRATE AND ACETAMINOPHEN 5; 325 MG/1; MG/1
1 TABLET ORAL EVERY 4 HOURS PRN
Status: DISCONTINUED | OUTPATIENT
Start: 2022-05-09 | End: 2022-05-10 | Stop reason: HOSPADM

## 2022-05-09 RX ORDER — SODIUM CHLORIDE 0.9 % (FLUSH) 0.9 %
2 SYRINGE (ML) INJECTION
Status: DISCONTINUED | OUTPATIENT
Start: 2022-05-09 | End: 2022-05-10 | Stop reason: HOSPADM

## 2022-05-09 RX ADMIN — LABETALOL HYDROCHLORIDE 200 MG: 200 TABLET, FILM COATED ORAL at 09:05

## 2022-05-09 RX ADMIN — IBUPROFEN 600 MG: 600 TABLET ORAL at 11:05

## 2022-05-09 RX ADMIN — DOCUSATE SODIUM 200 MG: 100 CAPSULE, LIQUID FILLED ORAL at 08:05

## 2022-05-09 RX ADMIN — ENOXAPARIN SODIUM 40 MG: 100 INJECTION SUBCUTANEOUS at 11:05

## 2022-05-09 RX ADMIN — SODIUM CHLORIDE, SODIUM LACTATE, POTASSIUM CHLORIDE, AND CALCIUM CHLORIDE 1000 ML: .6; .31; .03; .02 INJECTION, SOLUTION INTRAVENOUS at 03:05

## 2022-05-09 RX ADMIN — IBUPROFEN 600 MG: 600 TABLET ORAL at 05:05

## 2022-05-09 RX ADMIN — ENOXAPARIN SODIUM 40 MG: 100 INJECTION SUBCUTANEOUS at 08:05

## 2022-05-09 RX ADMIN — MAGNESIUM SULFATE IN WATER 2 G/HR: 40 INJECTION, SOLUTION INTRAVENOUS at 06:05

## 2022-05-09 RX ADMIN — LABETALOL HYDROCHLORIDE 200 MG: 200 TABLET, FILM COATED ORAL at 08:05

## 2022-05-09 RX ADMIN — MAGNESIUM SULFATE IN WATER 2 G/HR: 40 INJECTION, SOLUTION INTRAVENOUS at 03:05

## 2022-05-09 RX ADMIN — ESCITALOPRAM OXALATE 20 MG: 10 TABLET ORAL at 09:05

## 2022-05-09 RX ADMIN — LABETALOL HYDROCHLORIDE 20 MG: 5 INJECTION INTRAVENOUS at 12:05

## 2022-05-09 RX ADMIN — FAMOTIDINE 20 MG: 20 TABLET ORAL at 08:05

## 2022-05-09 RX ADMIN — DOCUSATE SODIUM 200 MG: 100 CAPSULE, LIQUID FILLED ORAL at 11:05

## 2022-05-09 RX ADMIN — MAGNESIUM SULFATE IN WATER 2 G/HR: 40 INJECTION, SOLUTION INTRAVENOUS at 12:05

## 2022-05-09 NOTE — PHYSICIAN QUERY
PT Name: Marcie Cheek  MR #: [id number]     Documentation Clarification      CDS/: KRISTEL Roberto,RNC-MNN       Contact information:[email or web address]    This form is a permanent document in the medical record.     Query Date: May 9, 2022    By submitting this query, we are merely seeking further clarification of documentation. Please utilize your independent clinical judgment when addressing the question(s) below.    The Medical Record reflects the following:    Supporting Clinical Findings Location in Medical Record   DM2   - NPH 22 during pregnancy, NPH 10 post partum  - Fasting glucose pending collection  - Continue trending BG fasting/pre prandial    Diabetes mellitus affecting pregnancy in third trimester     This IUP is complicated by RNI, depression/anxiety, obesity, GDM, GBS, cHTN, IUI pregnancy.    POCT Glucose=94-->104-->112-->119-->122-->119-->136-->87    Monitor blood sugars and treat PRN     insulin NPH injection 22 Units    Frequency: 2 times daily OB Progress note 5/9@627am                    LAB 5/7-5/9    OB Progress note 5/7@851pm    MAR 5/7-5/8                                                                                Provider, please clarify the type of Diabetes Mellitus.    [  x ] Pre-existing Type 2 Diabetes Mellitus   [   ] Gestational Diabetes Mellitus, insulin controlled   [   ] Other (please specify): ____________   [  ] Clinically undetermined

## 2022-05-09 NOTE — CARE UPDATE
Notified by nursing of low urine output, 100mL in the last 3 hours.     BP normal to mild range   Bloody urine in godfrey   DTR 2+  Lungs clear to auscultation     Exam overall reassuring. Patient reports being fatigued with induction process and frustrated from pain 2/2 first epidural dislodging and needing to replaced. Patient states she would like a c/s if unchanged at next check or does not achieve better pain control. Will continue to monitor and readdress at next check.    Nickie Ya MD  OBGYN, PGY-1

## 2022-05-09 NOTE — CARE UPDATE
Resident to bedside for cervical exam. SVE 10/100/0, Cat 2 (variable and early decels).    Staff notified. Will set up for 2nd stage of labor.    Nickie Ya MD  OBGYN, PGY-1

## 2022-05-09 NOTE — DISCHARGE INSTRUCTIONS
Discharge Instructions    The AAP recommends exclusive breastfeeding for about the first 6 months of age and as solid foods are introduced, continued breastfeeding  for at least 1 year or longer.    Feed the baby at the earliest sign of hunger or comfort (see signs on the back cover of the Mother's Breastfeeding Guide)  Hands to mouth, sucking motions  Rooting or searching for something to suck on  Dont wait for crying - it is a sign of distress    The feedings may be 8-12 times per 24hrs and will not follow a schedule  Avoid pacifiers and bottles for the first 4 weeks  Alternate the breast you start the feeding with, or start with the breast that feels the fullest  Switch breasts when the baby takes himself off the breast or falls asleep  Keep offering breasts until the baby looks full, no longer gives hunger signs, and stays asleep when placed on his back in the crib  If the baby is sleepy and wont wake for a feeding, put the baby skin-to-skin dressed in a diaper against the mothers bare chest  Sleep with your baby in your room near you  The baby should be positioned and latched on to the breast correctly  Chest-to-chest, chin in the breast  Babys lips are flipped outward  Babys mouth is stretched open wide like a shout  Babys sucking should feel like tugging to the mother  The baby should be drinking at the breast:  You should hear swallowing or gulping throughout the feeding  You should see milk on the babys lips when he comes off the breast  Your breasts should be softer when the baby is finished feeding  The baby should look relaxed at the end of feedings  After the 4th day and your milk is in:  The babys poop should turn bright yellow and be loose, watery, and seedy  The baby should have at least 3-4 poops the size of the palm of your hand per day  The baby should have at least 5-6 wet diapers per day  The urine should be light yellow in color  You should drink when you are thirsty and eat a  healthy diet when you are    hungry.     Take naps to get the rest you need.   Take medications and/or drink alcohol only with permission of your obstetrician    or the babys pediatrician.  You can also call the Infant Risk Center,   ([phone]), Monday-Friday, 8am-5pm Central time, to get the most   up-to-date evidence-based information on the use of medications during   pregnancy and breastfeeding.      Complete the First Alert form in the Mother's Breastfeeding Guide 3-5 days after the baby's birth.  Please call the Breastfeeding Warmline ([phone]) or the baby's pediatrician if you have any concerns.    The baby should be examined by a pediatrician at 3-5 days of age and again at 2 weeks of age.  Once your milk production increases, the baby should be gaining at least ½ - 1oz each day and should be back to birthweight no later than 10-14 days of age.  If this is not the case, please call the Breastfeeding Warmline ([phone]) for assistance and support.    Community Resources    [facility] Breastfeeding Warmline: [phone]   Local [facility] clinics: provide incentives and breastpumps to eligible mothers 6-952-176-BABY  La Leche League International (LLLI):  mother-to-mother support group website        www.lll.[employer]  [facility] La Leche League mother-to-mother support groups:        www.lllDoodle.[place]        La Leche League [facility]   Dr. Cecilio Ro website for latch videos and general information:        www.breastfeedinginc.ca  [facility] Risk Center is a call center that provides information about the safety of taking medications while breastfeeding.  Call 6-772-573-0974, M-F, 8am-5pm, CT.  International Lactation Consultant Association provides resources for assistance:        www.ilca.org  [place] Breastfeeding Coalition provides informationand resources for parents  and the community    www.LaBreastfeedingSupport.org  Selin Ashley is a mom-to-mom support group:                              www.nolanesting.com//breastfeedng-support/  [employer] for Healthy Babies:  3-993-383-BABY(7265)  [employer]: a breastfeeding support group for women of color, 528.394.1899

## 2022-05-09 NOTE — PHYSICIAN QUERY
PT Name: Marcie Cheek  MR #: [id number]    DOCUMENTATION CLARIFICATION      CDS/: KRISTEL Roberto,RNC-MNN        Contact information:[email or web address]    This form is a permanent document in the medical record.      Query Date: May 9, 2022    By submitting this query, we are merely seeking further clarification of documentation. Please utilize your independent clinical judgment when addressing the question(s) below.    The Medical Record contains the following:   Indicators  Supporting Clinical Findings Location in Medical Record   X Anemia documented Anemia OB Progress note @627am   X H&H Hgb=10.4-->11.6-->10.6  Hct=31.5-->35.1-->32.4 LAB -    BP                    HR      GI bleeding documented     X Acute bleeding (Non GI site) Calculated QBL (mL): 300 L&D Delivery note     Transfusion(s)     X Acute/Chronic illness   cHTN with SI PreE w/SF  DM2 OB Progress note @627am    Treatments      Other       Provider, please specify diagnosis or diagnoses associated with above clinical findings.   [   ] Acute blood loss anemia    [ x  ] Anemia, unspecified    [   ] Other Hematological Diagnosis (please specify): _________________   [   ] Clinically Undetermined     Present on admission (POA) status:   [  x ] Yes (Y)   [   ] No (N)   [   ] Documentation insufficient to determine if condition is POA (U)   [  ] Clinically Undetermined (W)          Please document in your progress notes daily for the duration of treatment, until resolved, and include in your discharge summary.    Form No. [id number]

## 2022-05-09 NOTE — L&D DELIVERY NOTE
[facility] - Labor & Delivery  Vaginal Delivery   Operative Note    SUMMARY     Resident to bedside for cervical exam. SVE 10100/0, patient set up for the 2nd stage of labor.    Normal spontaneous vaginal delivery of live infant, was placed on mothers abdomen for skin to skin and bulb suctioning performed.  Infant delivered position SILVIO over intact perineum.  Nuchal cord: Yes, cord reduced following delivery.    Spontaneous delivery of placenta and IV pitocin given noting uterine atony with bleeding. Cytotec 800mcg WI administered with improvement in uterine tone and bleeding.    Right periurethral laceration noted and repaired with interrupeted sutures of 3-0 vicryl . Left vaginal laceration also noted and repaired with a figure of 16 of 2-0 vicryl and figure of 8 of 3-0 vicryl.    Patient tolerated delivery well. Sponge needle and lap counted correctly x2.    Nickie Ya MD  OBGYN, PGY-1      Indications:  (spontaneous vaginal delivery)  Pregnancy complicated by:   Patient Active Problem List   Diagnosis    Foot dermatitis    Obesity affecting pregnancy in third trimester    Rubella non-immune status, antepartum    Elevated liver enzymes    Depression affecting pregnancy    Diabetes mellitus affecting pregnancy in third trimester    Chronic hypertension affecting pregnancy    Pre-eclampsia in third trimester     (spontaneous vaginal delivery)     Admitting GA: 36w4d    Delivery Information for Girl Marcie Cheek    Birth information:  YOB: 2023   Time of birth: 10:14 PM   Sex: female   Head Delivery Date/Time: 2022 10:14 PM   Delivery type: Vaginal, Spontaneous   Gestational Age: 36w4d    Delivery Providers    Delivering clinician: Holley Hernandez MD   Provider Role    Nickie Ya MD Resident    Nancy Eden RN Registered Nurse    Lianet Perez RN Registered Nurse    Angie Perez RN Registered Nurse              Measurements    Weight: 2790 g  Weight (lbs): 6 lb 2.4  "oz  Length: 47 cm  Length (in): 18.5"  Head circumference: 13.5 cm  Chest circumference: 12.5 cm         Apgars    Living status: Living  Apgars:  1 min.:  5 min.:  10 min.:  15 min.:  20 min.:    Skin color:  1  1  1      Heart rate:  2  2  2      Reflex irritability:  0  1  2      Muscle tone:  1  1  1      Respiratory effort:  0  1  2      Total:  4  6  8               Operative Delivery    Forceps attempted?: No  Vacuum extractor attempted?: No               Presentation    Presentation: Vertex  Position: Right Occiput Anterior           Interventions/Resuscitation    Method: Bulb Suctioning, Blow By Oxygen, Tactile Stimulation, Deep Suctioning       Cord    Vessels: 3 vessels  Complications: Nuchal  Nuchal Intervention: reduced  Nuchal Cord Description: tight nuchal cord  Number of Loops: 1  Delayed Cord Clamping?: Yes  Cord Clamped Date/Time: 2022 10:15 PM  Cord Blood Disposition: Sent with Baby  Gases Sent?: Yes       Placenta    Placenta delivery date/time: 2022  Placenta removal: Expressed  Placenta appearance: Intact  Placenta disposition: discarded           Labor Events:       labor: Yes     Labor Onset Date/Time: 2022 13:38     Dilation Complete Date/Time: 2022 20:58     Start Pushing Date/Time: 2022 21:28       Start Pushing Date/Time: 2022 21:28     Rupture Date/Time: 22         Rupture type:            Fluid Amount:         Fluid Color: Clear        Fluid Odor:         Membrane Status: ARM (Artificial Rupture)                 steroids: Full Course     Antibiotics given for GBS: Yes     Induction: balloon dilation (Knapp);misoprostol     Indications for induction:  Severe Preeclampsia     Augmentation: amniotomy;oxytocin     Indications for augmentation: Preeclampsia;Ineffective Contraction Pattern     Labor complications: None     Additional complications:          Cervical ripening:                     Delivery:      Episiotomy: None   "   Indication for Episiotomy:       Perineal Lacerations: None Repaired:      Periurethral Laceration: right Repaired: Yes   Labial Laceration:   Repaired:     Sulcus Laceration:   Repaired:     Vaginal Laceration: Yes Repaired: Yes   Cervical Laceration:   Repaired:     Repair suture:       Repair # of packets: 2     Last Value - EBL - Nursing (mL):       Sum - EBL - Nursing (mL): 0     Last Value - EBL - Anesthesia (mL):        Calculated QBL (mL): 300        Vaginal Sweep Performed: Yes     Surgicount Correct: Yes       Other providers:       Anesthesia    Method: Epidural          Details (if applicable):  Trial of Labor      Categorization:      Priority:     Indications for :     Incision Type:       Additional  information:  Forceps:    Vacuum:    Breech:    Observed anomalies    Other (Comments):       I was present for the entire procedure, and agree with the above resident's assessment of findings and description of procedure.  Holley Hernandez M.D.

## 2022-05-09 NOTE — LACTATION NOTE
Lactation visited pt to review breastfeeding basics and breastfeeding plan. Discussed with pt expectation of a later  baby and breastfeeding behaviors. Pt wanted to attempt latch with the baby. Baby placed in football hold on the right breast. Baby began crying and displaying stridor sounds. The attempt to latch was stopped and baby placed skin to skin and the stridor stopped. Pt encouraged to keep the baby skin to skin. Hand expression assistance offered. Mom requested to not hand express/ or pump today and rest. Pt plans to feed the baby 8 or more times in 24hrs on cue with donor milk and use the pump or hand expression if she feels like she can.    22 1630   Maternal Assessment   Breast Shape Bilateral:;pendulous   Breast Density Bilateral:;soft   Areola Bilateral:;elastic   Nipples Bilateral:;everted;bulbous   Maternal Infant Feeding   Maternal Emotional State assist needed   Latch Assistance yes   Equipment Type   Breast Pump Type double electric, hospital grade   Breast Pump Flange Type hard   Breast Pump Flange Size 27 mm   Breast Pumping   Breast Pumping Interventions post-feed pumping encouraged;frequent pumping encouraged   Breast Pumping hand expression utilized;double electric breast pump utilized

## 2022-05-09 NOTE — PLAN OF CARE
Pt ambulating and voiding without difficulty. Patient safety maintained, side rails up, bed low and locked position.  Pain well controlled with PRN pain medication. Fundus midline, firm, with moderate lochia. VSS. Intake and out managed. Pt receiving continuous IV fluids of Mag/LR as ordered. Patient has denied any visual changes, RUQ pain, or headaches. Significant other at bedside; parents responding to infant cues and bonding appropriately. Will continue to monitor.

## 2022-05-09 NOTE — PROGRESS NOTES
POSTPARTUM PROGRESS NOTE    Subjective:     PPD/POD#: 1   Procedure:    EGA: 36w5d   N/V: No   F/C: No   Abd Pain: Mild, well-controlled with oral pain medication   Lochia: Mild   Voiding: Yes   Ambulating: Yes   Bowel fnc: Yes   Breastfeeding: Yes   Contraception: Phexxi   Circumcision: N/A, female     Objective:      Temp:  [96.8 °F (36 °C)-98.4 °F (36.9 °C)] 98.4 °F (36.9 °C)  Pulse:  [73-97] 88  Resp:  [18-20] 18  SpO2:  [94 %-100 %] 94 %  BP: (116-188)/(57-98) 128/70    Lung: Normal respiratory effort   Abdomen: Soft, appropriately tender   Uterus: Firm, no fundal tenderness   Incision: N/A   : Deferred   Extremities: Bilateral trace edema     Lab Review    Recent Labs   Lab 22  1142 22  0632 22  1455    137  --    K 5.6* 4.2  --     105  --    CO2 18* 20*  --    BUN 11 8  --    CREATININE 0.7 0.7  --    * 100  --    PROT 7.1 6.3  --    BILITOT 0.3 0.6  --    ALKPHOS 125 129  --    ALT 16 13  --    AST 52* 19  --    MG  --   --  5.3*       Recent Labs   Lab 22  1400 22  1142 22  0632 22  2235   WBC 11.69 9.35 15.04*  --    HGB 11.9* 10.4* 11.6*  --    HCT 35.7* 31.5* 35.1* 54   MCV 90 91 92  --     186 237  --          I/O    Intake/Output Summary (Last 24 hours) at 2022 0609  Last data filed at 2022 0330  Gross per 24 hour   Intake 1090.7 ml   Output 3420 ml   Net -2329.3 ml        Assessment and Plan:   Postpartum care:  - Patient doing well.  - Continue routine management and advances.    cHTN with SI PreE w/SF  - BP as above  - asymptomatic  - preE labs as above  - UOP: 0.80 cc/kg/hr  - Mag: continue  - Hypertensive agent s/p labetalol 20, continue labetalol 200 BID    DM2  - NPH 22 during pregnancy, NPH 10 post partum  - Fasting glucose pending collection  - Continue trending BG fasting/pre prandial    MO  - Pre Preg BMI >40  - Continue Lovenox 40 BID    Anemia   - H/H as above  - QBL: 300  - asymptomatic    Mood Disorder  -  Mood stable  - Medications: lexapro  - Will need 1-2 week postpartum mood check    RNI  - MMR post partum        Katherine C Boecking

## 2022-05-09 NOTE — ANESTHESIA POSTPROCEDURE EVALUATION
Anesthesia Post Evaluation    Patient: Marcie Cheek    Procedure(s) Performed: * No procedures listed *    Final Anesthesia Type: epidural      Patient location during evaluation: labor & delivery  Patient participation: Yes- Able to Participate  Level of consciousness: awake and alert  Post-procedure vital signs: reviewed and stable  Pain management: adequate  Airway patency: patent    PONV status at discharge: No PONV  Anesthetic complications: no      Cardiovascular status: blood pressure returned to baseline and stable  Respiratory status: room air, unassisted and spontaneous ventilation  Hydration status: euvolemic  Follow-up not needed.          Vitals Value Taken Time   /66 05/09/22 1506   Temp 36.9 °C (98.4 °F) 05/09/22 1200   Pulse 153 05/09/22 1459   Resp 17 05/09/22 1200   SpO2 79 % 05/09/22 1500   Vitals shown include unvalidated device data.      No case tracking events are documented in the log.      Pain/Alla Score: Pain Rating Prior to Med Admin: 5 (5/9/2022 11:21 AM)  Pain Rating Post Med Admin: 2 (5/9/2022 12:20 PM)

## 2022-05-10 VITALS
WEIGHT: 293 LBS | OXYGEN SATURATION: 97 % | HEIGHT: 67 IN | RESPIRATION RATE: 16 BRPM | SYSTOLIC BLOOD PRESSURE: 146 MMHG | BODY MASS INDEX: 45.99 KG/M2 | TEMPERATURE: 98 F | HEART RATE: 84 BPM | DIASTOLIC BLOOD PRESSURE: 72 MMHG

## 2022-05-10 LAB — POCT GLUCOSE: 96 MG/DL (ref 70–110)

## 2022-05-10 PROCEDURE — 25000003 PHARM REV CODE 250: Performed by: STUDENT IN AN ORGANIZED HEALTH CARE EDUCATION/TRAINING PROGRAM

## 2022-05-10 PROCEDURE — 63600175 PHARM REV CODE 636 W HCPCS: Performed by: STUDENT IN AN ORGANIZED HEALTH CARE EDUCATION/TRAINING PROGRAM

## 2022-05-10 RX ORDER — ACETAMINOPHEN 325 MG/1
650 TABLET ORAL EVERY 6 HOURS PRN
Qty: 60 TABLET | Refills: 2 | Status: SHIPPED | OUTPATIENT
Start: 2022-05-10 | End: 2022-06-21

## 2022-05-10 RX ORDER — DOCUSATE SODIUM 100 MG/1
200 CAPSULE, LIQUID FILLED ORAL 2 TIMES DAILY PRN
Qty: 60 CAPSULE | Refills: 2 | Status: SHIPPED | OUTPATIENT
Start: 2022-05-10 | End: 2022-06-21

## 2022-05-10 RX ORDER — IBUPROFEN 800 MG/1
800 TABLET ORAL EVERY 8 HOURS PRN
Qty: 60 TABLET | Refills: 1 | Status: SHIPPED | OUTPATIENT
Start: 2022-05-10 | End: 2022-06-21

## 2022-05-10 RX ORDER — LABETALOL 200 MG/1
200 TABLET, FILM COATED ORAL EVERY 12 HOURS
Qty: 60 TABLET | Refills: 11 | Status: SHIPPED | OUTPATIENT
Start: 2022-05-10 | End: 2022-05-11 | Stop reason: SDUPTHER

## 2022-05-10 RX ADMIN — ESCITALOPRAM OXALATE 20 MG: 10 TABLET ORAL at 08:05

## 2022-05-10 RX ADMIN — ENOXAPARIN SODIUM 40 MG: 100 INJECTION SUBCUTANEOUS at 08:05

## 2022-05-10 RX ADMIN — LABETALOL HYDROCHLORIDE 200 MG: 200 TABLET, FILM COATED ORAL at 08:05

## 2022-05-10 RX ADMIN — IBUPROFEN 600 MG: 600 TABLET ORAL at 01:05

## 2022-05-10 RX ADMIN — IBUPROFEN 600 MG: 600 TABLET ORAL at 05:05

## 2022-05-10 RX ADMIN — HYDROCODONE BITARTRATE AND ACETAMINOPHEN 1 TABLET: 5; 325 TABLET ORAL at 03:05

## 2022-05-10 NOTE — PLAN OF CARE
Problem:  Fall Injury Risk  Goal: Absence of Fall, Infant Drop and Related Injury  Outcome: Ongoing, Progressing     Problem: Infection  Goal: Absence of Infection Signs and Symptoms  Outcome: Ongoing, Progressing     Problem: Adult Inpatient Plan of Care  Goal: Plan of Care Review  Outcome: Ongoing, Progressing  Goal: Patient-Specific Goal (Individualized)  Outcome: Ongoing, Progressing  Goal: Absence of Hospital-Acquired Illness or Injury  Outcome: Ongoing, Progressing  Goal: Optimal Comfort and Wellbeing  Outcome: Ongoing, Progressing  Goal: Readiness for Transition of Care  Outcome: Ongoing, Progressing     Problem: Bariatric Environmental Safety  Goal: Safety Maintained with Care  Outcome: Ongoing, Progressing     Problem: Diabetes Comorbidity  Goal: Blood Glucose Level Within Targeted Range  Outcome: Ongoing, Progressing     Problem: Breastfeeding  Goal: Effective Breastfeeding  Outcome: Ongoing, Progressing     Problem: Adjustment to Role Transition (Postpartum Vaginal Delivery)  Goal: Successful Maternal Role Transition  Outcome: Ongoing, Progressing     Problem: Bleeding (Postpartum Vaginal Delivery)  Goal: Hemostasis  Outcome: Ongoing, Progressing     Problem: Infection (Postpartum Vaginal Delivery)  Goal: Absence of Infection Signs/Symptoms  Outcome: Ongoing, Progressing     Problem: Pain (Postpartum Vaginal Delivery)  Goal: Acceptable Pain Control  Outcome: Ongoing, Progressing     Problem: Hypertensive Disorders in Pregnancy  Goal: Maternal-Fetal Stabilization  Outcome: Ongoing, Progressing   Pt VSS, ambulating independently, pain well managed with PO meds, fundus firm and midline and moderate lochia rubra noted upon assessment. Pt tearful/crying with infant in NICU. PP f/u scheduled for 1 week to assessment BP and mood. DCT and LCT completed. To be discharged home.

## 2022-05-10 NOTE — DISCHARGE SUMMARY
Delivery Discharge Summary  Obstetrics      Primary OB Clinician: Holley Hernandez MD     Admission date: 2022  Discharge date: 05/10/2022    Disposition: To home, self care    Discharge Diagnosis List:  Patient Active Problem List   Diagnosis    Foot dermatitis    Obesity affecting pregnancy in third trimester    Rubella non-immune status, antepartum    Elevated liver enzymes    Depression affecting pregnancy    Diabetes mellitus affecting pregnancy in third trimester    Chronic hypertension affecting pregnancy    Pre-eclampsia in third trimester     (spontaneous vaginal delivery)       Procedure:      Hospital Course:  Marcie Cheek is a 31 y.o. now , PPD #2 who was admitted on 2022 at 36w3d for Pre E w/ SF (HA, BP). Magnesium admin for 24hr postpartum. Patient was subsequently admitted to labor and delivery unit with signed consents.     Labor course was uncomplicated and resulted in  without complications.     Labetalol BID started on PPD 1.   Please see delivery note for further details. Her postpartum course was uncomplicated. On discharge day, patient's pain is controlled with oral pain medications. Pt is tolerating ambulation without SOB or CP, and regular diet without N/V. Reports lochia is mild. Denies any HA, vision changes, F/C, LE swelling. Denies any breast pain/soreness.    Pt in stable condition and ready for discharge. She has been instructed to continue NPH 10u nightly and/or continue medications and follow up with her obstetrics provider as listed below.     Pertinent studies:  CBC  Recent Labs   Lab 22  1142 22  0632 22  2235 22  0526   WBC 9.35 15.04*  --  17.02*   HGB 10.4* 11.6*  --  10.6*   HCT 31.5* 35.1* 54 32.4*   MCV 91 92  --  92    237  --  229          Immunization History   Administered Date(s) Administered    Tdap 2022        Delivery:    Episiotomy: None   Lacerations: None   Repair suture:     Repair # of  packets: 2   Blood loss (ml):       Birth information:  YOB: 2023   Time of birth: 10:14 PM   Sex: female   Delivery type: Vaginal, Spontaneous   Gestational Age: 36w4d    Delivery Clinician:      Other providers:       Additional  information:  Forceps:    Vacuum:    Breech:    Observed anomalies      Living?:           APGARS  One minute Five minutes Ten minutes   Skin color:         Heart rate:         Grimace:         Muscle tone:         Breathing:         Totals: 4  6  8      Placenta: Delivered:       appearance      Patient Instructions:   Current Discharge Medication List      START taking these medications    Details   acetaminophen (TYLENOL) 325 MG tablet Take 2 tablets (650 mg total) by mouth every 6 (six) hours as needed.  Qty: 60 tablet, Refills: 2      docusate sodium (COLACE) 100 MG capsule Take 2 capsules (200 mg total) by mouth 2 (two) times daily as needed for Constipation.  Qty: 60 capsule, Refills: 2      ibuprofen (ADVIL,MOTRIN) 800 MG tablet Take 1 tablet (800 mg total) by mouth every 8 (eight) hours as needed for Pain (Take scheduled for next 5-7 days, then as needed for pain).  Qty: 60 tablet, Refills: 1      labetaloL (NORMODYNE) 200 MG tablet Take 1 tablet (200 mg total) by mouth every 12 (twelve) hours.  Qty: 60 tablet, Refills: 11    Comments: .         CONTINUE these medications which have NOT CHANGED    Details   cyclobenzaprine (FLEXERIL) 10 MG tablet Take 0.5 tablets (5 mg total) by mouth daily as needed for Muscle spasms.  Qty: 5 tablet, Refills: 0      EScitalopram oxalate (LEXAPRO) 20 MG tablet Take 1 tablet (20 mg total) by mouth once daily.  Qty: 30 tablet, Refills: 2      famotidine (PEPCID ORAL) Take by mouth as needed.      magnesium oxide 400 mg magnesium Tab Take 400 mg by mouth every 12 (twelve) hours as needed (headaches).  Qty: 10 tablet, Refills: 0      PNV no.1/iron,carb/docus/folic (PREN VIT COMB.1-IRON CB-FA-DSS ORAL) Take by mouth.         STOP taking  "these medications       aspirin (ECOTRIN) 81 MG EC tablet Comments:   Reason for Stopping:         blood-glucose meter kit Comments:   Reason for Stopping:         insulin lispro (HUMALOG KWIKPEN INSULIN) 100 unit/mL pen Comments:   Reason for Stopping:         insulin NPH isoph U-100 human (HUMULIN N NPH INSULIN KWIKPEN) 100 unit/mL (3 mL) InPn Comments:   Reason for Stopping:         insulin syringe-needle U-100 1 mL 29 gauge x 1/2" Syrg Comments:   Reason for Stopping:         lancets Misc Comments:   Reason for Stopping:         pen needle, diabetic 32 gauge x 5/32" Ndle Comments:   Reason for Stopping:         TRUE METRIX GLUCOSE TEST STRIP Strp Comments:   Reason for Stopping:         TRUEPLUS LANCETS 30 gauge Misc Comments:   Reason for Stopping:               Discharge Procedure Orders   Diet Adult Regular     Lifting restrictions   Order Comments: Nothing heavier than baby for 6 weeks     No driving until:   Order Comments: Until not taking narcotics     Pelvic Rest   Order Comments: Nothing in the vagina for 6 weeks     Notify your health care provider if you experience any of the following:  temperature >100.4     Notify your health care provider if you experience any of the following:  persistent nausea and vomiting or diarrhea     Notify your health care provider if you experience any of the following:  severe uncontrolled pain     Notify your health care provider if you experience any of the following:  severe persistent headache     Notify your health care provider if you experience any of the following:   Order Comments: Heavy vaginal bleeding, saturating one pad/hour for two consecutive hours     Activity as tolerated        Follow-up Information     Holley Hernandez MD Follow up in 6 week(s).    Specialty: Obstetrics and Gynecology  Why: Postpartum follow up,  Blood pressure check 1-2 weeks  Contact information:  [address] [place] [address] [id number] [phone]                      "   Katherine Boecking MD   Ob/Gyn PGY-2

## 2022-05-10 NOTE — LACTATION NOTE
LC did DC teaching for NICU mother pumping for her baby. Mother has NICU Mother's Breastfeeding Guide. Reviewed how to work pump, how to keep track of pumpings, how to label nicu breastmilk, how to clean pump parts and bring milk to NICU even if it is only a drop of milk. NICU uses mother's milk for mouth care so even small amounts are ok to bring to NICU. Mother aware to pump 8 or more times a day for 15-20 minutes. She pumped 5 times in 24hrs. Mother is aware of proper techniques for transporting her breastmilk and is aware of the written instructions in her Mother's NICU Breastfeeding Guide. Mother is using a Medela PIS Max flow pump at home and is aware that she can use the Symphony breastpump at the baby's bedside when she visits. Mother has the [facility] phone number and the NICU  phone number to call for further questions.

## 2022-05-10 NOTE — PROGRESS NOTES
POSTPARTUM PROGRESS NOTE    Subjective:     PPD/POD#: 2   Procedure:    EGA: 36w5d   N/V: No   F/C: No   Abd Pain: Mild, well-controlled with oral pain medication   Lochia: Mild   Voiding: Yes   Ambulating: Yes   Bowel fnc: Yes   Breastfeeding: Yes   Contraception: Phexxi   Circumcision: N/A, female     Objective:      Temp:  [97.4 °F (36.3 °C)-98.4 °F (36.9 °C)] 98.3 °F (36.8 °C)  Pulse:  [77-91] 84  Resp:  [16-18] 16  SpO2:  [94 %-99 %] 97 %  BP: (111-157)/(58-85) 146/72    Lung: Normal respiratory effort   Abdomen: Soft, appropriately tender   Uterus: Firm, no fundal tenderness   Incision: N/A   : Deferred   Extremities: Bilateral trace edema     Lab Review    Recent Labs   Lab 22  1142 22  0632 22  1455    137  --    K 5.6* 4.2  --     105  --    CO2 18* 20*  --    BUN 11 8  --    CREATININE 0.7 0.7  --    * 100  --    PROT 7.1 6.3  --    BILITOT 0.3 0.6  --    ALKPHOS 125 129  --    ALT 16 13  --    AST 52* 19  --    MG  --   --  5.3*       Recent Labs   Lab 22  1142 22  0632 22  2235 22  0526   WBC 9.35 15.04*  --  17.02*   HGB 10.4* 11.6*  --  10.6*   HCT 31.5* 35.1* 54 32.4*   MCV 91 92  --  92    237  --  229         I/O    Intake/Output Summary (Last 24 hours) at 5/10/2022 1038  Last data filed at 5/10/2022 0500  Gross per 24 hour   Intake 1518.89 ml   Output 3150 ml   Net -1631.11 ml        Assessment and Plan:   Postpartum care:  - Patient doing well.  - Continue routine management and advances.    cHTN with SI PreE w/SF  - BP as above  - asymptomatic  - preE labs as above  - UOP: 0.80 cc/kg/hr  - Mag: s/p  - Hypertensive agent: continue labetalol 200 BID on discharge home    DM2  - NPH 22 during pregnancy, NPH 10u nightly post partum  - Fasting glucose: 96  - Recommend continue trending BG fasting/pre prandial    MO  - Pre Preg BMI >40  - Continue Lovenox 40 BID    Anemia   - H/H as above  - QBL: 300  - asymptomatic    Mood  Disorder  - Mood stable  - Medications: lexapro  - Will need 1-2 week postpartum mood check    RNI  - MMR post partum        Sun Urrutia MD

## 2022-05-10 NOTE — PLAN OF CARE
Plan of care reviewed with patient at bedside, questions answered and support provided - pt verbalized understanding of POC. VSS over night. Fundus is firm and midline, bleeding light moderate. Pain well controlled with ordered pain medications. Ambulating independently. Voiding spontaneously. Magnesium gtt discontinued overnight. Bonding appropriately with new born.  See flow sheets for more details.

## 2022-05-11 ENCOUNTER — HOSPITAL ENCOUNTER (EMERGENCY)
Facility: OTHER | Age: 32
Discharge: HOME | End: 2022-05-11
Attending: OBSTETRICS & GYNECOLOGY
Payer: COMMERCIAL

## 2022-05-11 VITALS
TEMPERATURE: 98 F | SYSTOLIC BLOOD PRESSURE: 142 MMHG | HEART RATE: 79 BPM | DIASTOLIC BLOOD PRESSURE: 80 MMHG | OXYGEN SATURATION: 100 % | RESPIRATION RATE: 17 BRPM

## 2022-05-11 DIAGNOSIS — I10 HYPERTENSION, UNSPECIFIED TYPE: ICD-10-CM

## 2022-05-11 DIAGNOSIS — R06.02 SHORTNESS OF BREATH: ICD-10-CM

## 2022-05-11 DIAGNOSIS — R06.02 SOB (SHORTNESS OF BREATH): Primary | ICD-10-CM

## 2022-05-11 LAB
ALBUMIN SERPL BCP-MCNC: 2.6 G/DL (ref 3.5–5.2)
ALP SERPL-CCNC: 102 U/L (ref 55–135)
ALT SERPL W/O P-5'-P-CCNC: 12 U/L (ref 10–44)
ANION GAP SERPL CALC-SCNC: 12 MMOL/L (ref 8–16)
AST SERPL-CCNC: 23 U/L (ref 10–40)
BASOPHILS # BLD AUTO: 0.03 K/UL (ref 0–0.2)
BASOPHILS NFR BLD: 0.3 % (ref 0–1.9)
BILIRUB SERPL-MCNC: 0.2 MG/DL (ref 0.1–1)
BUN SERPL-MCNC: 14 MG/DL (ref 6–20)
CALCIUM SERPL-MCNC: 8.8 MG/DL (ref 8.7–10.5)
CHLORIDE SERPL-SCNC: 107 MMOL/L (ref 95–110)
CO2 SERPL-SCNC: 21 MMOL/L (ref 23–29)
CREAT SERPL-MCNC: 0.8 MG/DL (ref 0.5–1.4)
DIFFERENTIAL METHOD: ABNORMAL
EOSINOPHIL # BLD AUTO: 0.2 K/UL (ref 0–0.5)
EOSINOPHIL NFR BLD: 1.8 % (ref 0–8)
ERYTHROCYTE [DISTWIDTH] IN BLOOD BY AUTOMATED COUNT: 16.9 % (ref 11.5–14.5)
EST. GFR  (AFRICAN AMERICAN): >60 ML/MIN/1.73 M^2
EST. GFR  (NON AFRICAN AMERICAN): >60 ML/MIN/1.73 M^2
GLUCOSE SERPL-MCNC: 75 MG/DL (ref 70–110)
HCT VFR BLD AUTO: 29.9 % (ref 37–48.5)
HGB BLD-MCNC: 9.6 G/DL (ref 12–16)
IMM GRANULOCYTES # BLD AUTO: 0.03 K/UL (ref 0–0.04)
IMM GRANULOCYTES NFR BLD AUTO: 0.3 % (ref 0–0.5)
LYMPHOCYTES # BLD AUTO: 2.4 K/UL (ref 1–4.8)
LYMPHOCYTES NFR BLD: 24.8 % (ref 18–48)
MCH RBC QN AUTO: 30.6 PG (ref 27–31)
MCHC RBC AUTO-ENTMCNC: 32.1 G/DL (ref 32–36)
MCV RBC AUTO: 95 FL (ref 82–98)
MONOCYTES # BLD AUTO: 0.7 K/UL (ref 0.3–1)
MONOCYTES NFR BLD: 7.5 % (ref 4–15)
NEUTROPHILS # BLD AUTO: 6.2 K/UL (ref 1.8–7.7)
NEUTROPHILS NFR BLD: 65.3 % (ref 38–73)
NRBC BLD-RTO: 0 /100 WBC
PLATELET # BLD AUTO: 182 K/UL (ref 150–450)
PMV BLD AUTO: 11.4 FL (ref 9.2–12.9)
POTASSIUM SERPL-SCNC: 4.4 MMOL/L (ref 3.5–5.1)
PROT SERPL-MCNC: 6.3 G/DL (ref 6–8.4)
RBC # BLD AUTO: 3.14 M/UL (ref 4–5.4)
SARS-COV-2 RDRP RESP QL NAA+PROBE: NEGATIVE
SODIUM SERPL-SCNC: 140 MMOL/L (ref 136–145)
WBC # BLD AUTO: 9.54 K/UL (ref 3.9–12.7)

## 2022-05-11 PROCEDURE — 63600175 PHARM REV CODE 636 W HCPCS

## 2022-05-11 PROCEDURE — U0002 COVID-19 LAB TEST NON-CDC: HCPCS

## 2022-05-11 PROCEDURE — 99284 EMERGENCY DEPT VISIT MOD MDM: CPT | Mod: 24,,, | Performed by: OBSTETRICS & GYNECOLOGY

## 2022-05-11 PROCEDURE — 25000003 PHARM REV CODE 250: Performed by: STUDENT IN AN ORGANIZED HEALTH CARE EDUCATION/TRAINING PROGRAM

## 2022-05-11 PROCEDURE — 99284 PR EMERGENCY DEPT VISIT,LEVEL IV: ICD-10-PCS | Mod: 24,,, | Performed by: OBSTETRICS & GYNECOLOGY

## 2022-05-11 PROCEDURE — 99284 EMERGENCY DEPT VISIT MOD MDM: CPT | Mod: 25

## 2022-05-11 PROCEDURE — 85025 COMPLETE CBC W/AUTO DIFF WBC: CPT

## 2022-05-11 PROCEDURE — 80053 COMPREHEN METABOLIC PANEL: CPT

## 2022-05-11 PROCEDURE — 96374 THER/PROPH/DIAG INJ IV PUSH: CPT

## 2022-05-11 RX ORDER — FUROSEMIDE 10 MG/ML
20 INJECTION INTRAMUSCULAR; INTRAVENOUS ONCE
Status: COMPLETED | OUTPATIENT
Start: 2022-05-11 | End: 2022-05-11

## 2022-05-11 RX ORDER — LABETALOL HYDROCHLORIDE 5 MG/ML
20 INJECTION, SOLUTION INTRAVENOUS ONCE
Status: DISCONTINUED | OUTPATIENT
Start: 2022-05-11 | End: 2022-05-11 | Stop reason: HOSPADM

## 2022-05-11 RX ORDER — NIFEDIPINE 10 MG/1
10 CAPSULE ORAL ONCE
Status: COMPLETED | OUTPATIENT
Start: 2022-05-11 | End: 2022-05-11

## 2022-05-11 RX ORDER — LABETALOL 200 MG/1
300 TABLET, FILM COATED ORAL EVERY 12 HOURS
Qty: 90 TABLET | Refills: 11 | Status: ON HOLD | OUTPATIENT
Start: 2022-05-11 | End: 2022-05-13 | Stop reason: HOSPADM

## 2022-05-11 RX ORDER — FUROSEMIDE 20 MG/1
20 TABLET ORAL DAILY
Qty: 4 TABLET | Refills: 0 | Status: SHIPPED | OUTPATIENT
Start: 2022-05-11 | End: 2022-06-21

## 2022-05-11 RX ADMIN — NIFEDIPINE 10 MG: 10 CAPSULE ORAL at 03:05

## 2022-05-11 RX ADMIN — FUROSEMIDE 20 MG: 10 INJECTION, SOLUTION INTRAMUSCULAR; INTRAVENOUS at 04:05

## 2022-05-11 NOTE — ED PROVIDER NOTES
Encounter Date: 2022       History     Chief Complaint   Patient presents with    Shortness of Breath     Marcie Cheek is a 31 y.o.  PPD#3 s/p  presenting for concerns of shortness of breath. Patient was discharged yesterday. She has had difficulty catching her breath and feels like she cannot take a deep breath. Also reports occasional palpitations, stating her heart feels like it is facing for a few seconds at a time. She states these symptoms are similar to those she experienced during labor and her postpartum course. Denies F/C, headaches, vision changes, chest pain, RUQ pain. Lochia is mild. This IUP was complicated by cHTN w/ DIANE w/ SF (HA, BP), DM2, MO, anemia, depression/anxiety, RNI. She was started on labetalol 200 BID, last took this at 9pm.           Review of patient's allergies indicates:  No Known Allergies  Past Medical History:   Diagnosis Date    Anxiety     Depression     Diabetes mellitus affecting pregnancy in third trimester 3/14/2022    Hypertension      Past Surgical History:   Procedure Laterality Date    HYSTEROSCOPY      WISDOM TOOTH EXTRACTION       Family History   Problem Relation Age of Onset    Thyroid disease Maternal Grandmother     Breast cancer Neg Hx     Colon cancer Neg Hx     Ovarian cancer Neg Hx      Social History     Tobacco Use    Smoking status: Never Smoker    Smokeless tobacco: Never Used   Substance Use Topics    Alcohol use: Not Currently    Drug use: Never     Review of Systems   Constitutional: Negative for chills and fever.   HENT: Negative for congestion and sore throat.    Eyes: Negative for visual disturbance.   Respiratory: Positive for shortness of breath. Negative for cough and chest tightness.    Cardiovascular: Positive for palpitations. Negative for chest pain.   Gastrointestinal: Negative for nausea.   Genitourinary: Positive for vaginal bleeding (mild lochia). Negative for dysuria and vaginal discharge.    Musculoskeletal: Negative for back pain.   Skin: Negative for rash.   Neurological: Negative for weakness and headaches.   Hematological: Does not bruise/bleed easily.       Physical Exam     Initial Vitals [05/11/22 0220]   BP Pulse Resp Temp SpO2   (!) 185/88 75 17 98.1 °F (36.7 °C) 100 %      MAP       --         Physical Exam    Vitals reviewed.  Constitutional: She appears well-developed and well-nourished. No distress.   HENT:   Head: Normocephalic and atraumatic.   Eyes: EOM are normal.   Neck:   Normal range of motion.  Cardiovascular: Normal rate, regular rhythm and normal heart sounds. Exam reveals no gallop and no friction rub.    No murmur heard.  Pulmonary/Chest: Breath sounds normal. No respiratory distress. She has no wheezes. She has no rhonchi. She has no rales.   Abdominal: Abdomen is soft. There is no abdominal tenderness. There is no rebound and no guarding.   Musculoskeletal:         General: Edema present.      Cervical back: Normal range of motion.     Neurological: She is alert and oriented to person, place, and time.   Skin: Skin is warm and dry.   Psychiatric: She has a normal mood and affect. Thought content normal.         ED Course   Procedures  Labs Reviewed   CBC W/ AUTO DIFFERENTIAL - Abnormal; Notable for the following components:       Result Value    RBC 3.14 (*)     Hemoglobin 9.6 (*)     Hematocrit 29.9 (*)     RDW 16.9 (*)     All other components within normal limits   COMPREHENSIVE METABOLIC PANEL - Abnormal; Notable for the following components:    CO2 21 (*)     Albumin 2.6 (*)     All other components within normal limits   SARS-COV-2 RNA AMPLIFICATION, QUAL          Imaging Results          X-Ray Chest PA And Lateral (Final result)  Result time 05/11/22 03:17:51    Final result by Negrito Younger MD (05/11/22 03:17:51)                 Impression:      Bilateral interstitial opacities, most pronounced at the lung bases, which may relate to interstitial edema.  Possible  trace bilateral pleural effusions.  Clinical correlation advised.      Electronically signed by: eNgrito Younger MD  Date:    05/11/2022  Time:    03:17             Narrative:    EXAMINATION:  XR CHEST PA AND LATERAL    CLINICAL HISTORY:  SOB, postpartum;    TECHNIQUE:  PA and lateral views of the chest were performed.    COMPARISON:  None    FINDINGS:  The cardiomediastinal silhouette appears within normal limits.  The lungs are symmetrically expanded.  There is bilateral increased interstitial attenuation which appears most pronounced at the lung bases.  No large confluent airspace consolidation appreciated.  There are possible trace bilateral pleural effusions.  There is no evidence of pneumothorax.  The visualized osseous structures appear intact.                                 Medications   NIFEdipine capsule 10 mg (10 mg Oral Given 5/11/22 0302)   furosemide injection 20 mg (20 mg Intravenous Given 5/11/22 0439)     Medical Decision Making:   ED Management:  - Afebrile   - /88 > 186/85 > Procardia 10 > 165/76 > labetalol 20 ordered but not given prior to next 15 min BP which was 136/60 > 158/70 > 146/66  - COVID neg   - CBC 9/9.6/29.9/182  - Cr 0.8, AST/ALT 23/12  - EKG: nsr   - CXR: concern for bilateral interstitial edema   - Patient reports symptoms have improved since arrival to [facility], however, due to concern for interstitial edema IV lasix 20mg administered   - Walk test performed after IV lasix and patient maintained O2 saturations >95%  - Patient continues to report improvement in symptoms   - Will increase labetalol to 300 BID and send Rx for 4 day course of lasix   - Patient stable for discharge at this time  - ED return precautions given  - She voiced understanding and is in agreement with the plan            Attending Attestation:   Physician Attestation Statement for Resident:  As the supervising MD   Physician Attestation Statement: I have personally seen and examined this patient.   I agree  with the above history. -:   As the supervising MD I agree with the above PE.    As the supervising MD I agree with the above treatment, course, plan, and disposition.   -: Patient evaluated and found to be stable, agree with resident's assessment and plan.  Bps severe range but resolved prior to being treated.  CXR with normal cardiac silhouette, trace bilateral pleural effusions.  Pre-E labs normal. No desaturation with ambulation.  Increase labetalol to 300mg po bid and lasix Rx.  Agree with discharge to home.    I was personally present during the critical portions of the procedure(s) performed by the resident and was immediately available in the ED to provide services and assistance as needed during the entire procedure.  I have reviewed and agree with the residents interpretation of the following: lab data.  I have reviewed the following: old records at this facility.                         -   Clinical Impression:   Final diagnoses:  [R06.02] SOB (shortness of breath) (Primary)  [I10] Hypertension, unspecified type          ED Disposition Condition    Discharge Stable        ED Prescriptions     Medication Sig Dispense Start Date End Date Auth. Provider    furosemide (LASIX) 20 MG tablet Take 1 tablet (20 mg total) by mouth once daily. for 4 days 4 tablet 5/11/2022 5/15/2022 Nickie Ya MD    labetaloL (NORMODYNE) 200 MG tablet (Expires today) Take 1.5 tablets (300 mg total) by mouth every 12 (twelve) hours. 90 tablet 5/11/2022 5/13/2022 Nickie Ya MD        Follow-up Information    None          Nickie Ya MD  OBGYN, PGY-1       Nickie Ya MD  Resident  05/11/22 0784       Louisa Luong MD  05/13/22 1584

## 2022-05-12 ENCOUNTER — TELEPHONE (OUTPATIENT)
Dept: OBSTETRICS AND GYNECOLOGY | Facility: CLINIC | Age: 32
End: 2022-05-12
Payer: COMMERCIAL

## 2022-05-12 ENCOUNTER — PATIENT MESSAGE (OUTPATIENT)
Dept: OBSTETRICS AND GYNECOLOGY | Facility: CLINIC | Age: 32
End: 2022-05-12
Payer: COMMERCIAL

## 2022-05-12 ENCOUNTER — HOSPITAL ENCOUNTER (OUTPATIENT)
Facility: OTHER | Age: 32
Discharge: HOME | End: 2022-05-13
Attending: OBSTETRICS & GYNECOLOGY | Admitting: OBSTETRICS & GYNECOLOGY
Payer: COMMERCIAL

## 2022-05-12 DIAGNOSIS — O11.9 CHRONIC HYPERTENSION WITH SUPERIMPOSED PREECLAMPSIA: Primary | ICD-10-CM

## 2022-05-12 DIAGNOSIS — O24.410 DIET CONTROLLED GESTATIONAL DIABETES MELLITUS (GDM) IN SECOND TRIMESTER: ICD-10-CM

## 2022-05-12 DIAGNOSIS — O24.913 DIABETES MELLITUS AFFECTING PREGNANCY IN THIRD TRIMESTER: ICD-10-CM

## 2022-05-12 LAB
POCT GLUCOSE: 85 MG/DL (ref 70–110)
POCT GLUCOSE: 86 MG/DL (ref 70–110)
POCT GLUCOSE: 92 MG/DL (ref 70–110)

## 2022-05-12 PROCEDURE — 99285 PR EMERGENCY DEPT VISIT,LEVEL V: ICD-10-PCS | Mod: ,,, | Performed by: OBSTETRICS & GYNECOLOGY

## 2022-05-12 PROCEDURE — 99285 EMERGENCY DEPT VISIT HI MDM: CPT | Mod: ,,, | Performed by: OBSTETRICS & GYNECOLOGY

## 2022-05-12 PROCEDURE — 99285 EMERGENCY DEPT VISIT HI MDM: CPT

## 2022-05-12 PROCEDURE — 25000003 PHARM REV CODE 250

## 2022-05-12 PROCEDURE — G0378 HOSPITAL OBSERVATION PER HR: HCPCS

## 2022-05-12 RX ORDER — DIPHENHYDRAMINE HYDROCHLORIDE 50 MG/ML
25 INJECTION INTRAMUSCULAR; INTRAVENOUS EVERY 4 HOURS PRN
Status: CANCELLED | OUTPATIENT
Start: 2022-05-12

## 2022-05-12 RX ORDER — HYDROCODONE BITARTRATE AND ACETAMINOPHEN 5; 325 MG/1; MG/1
1 TABLET ORAL EVERY 4 HOURS PRN
Status: CANCELLED | OUTPATIENT
Start: 2022-05-12

## 2022-05-12 RX ORDER — DIPHENHYDRAMINE HCL 25 MG
25 CAPSULE ORAL EVERY 4 HOURS PRN
Status: CANCELLED | OUTPATIENT
Start: 2022-05-12

## 2022-05-12 RX ORDER — ACETAMINOPHEN 325 MG/1
650 TABLET ORAL EVERY 6 HOURS PRN
Status: DISCONTINUED | OUTPATIENT
Start: 2022-05-12 | End: 2022-05-13 | Stop reason: HOSPADM

## 2022-05-12 RX ORDER — NIFEDIPINE 10 MG/1
10 CAPSULE ORAL ONCE
Status: COMPLETED | OUTPATIENT
Start: 2022-05-12 | End: 2022-05-12

## 2022-05-12 RX ORDER — IBUPROFEN 600 MG/1
600 TABLET ORAL EVERY 6 HOURS
Status: CANCELLED | OUTPATIENT
Start: 2022-05-12

## 2022-05-12 RX ORDER — ONDANSETRON 8 MG/1
8 TABLET, ORALLY DISINTEGRATING ORAL EVERY 8 HOURS PRN
Status: DISCONTINUED | OUTPATIENT
Start: 2022-05-12 | End: 2022-05-13 | Stop reason: HOSPADM

## 2022-05-12 RX ORDER — HYDROCORTISONE 25 MG/G
CREAM TOPICAL 3 TIMES DAILY PRN
Status: CANCELLED | OUTPATIENT
Start: 2022-05-12

## 2022-05-12 RX ORDER — ACETAMINOPHEN 325 MG/1
650 TABLET ORAL EVERY 6 HOURS PRN
Status: CANCELLED | OUTPATIENT
Start: 2022-05-12

## 2022-05-12 RX ORDER — FUROSEMIDE 20 MG/1
20 TABLET ORAL DAILY
Status: DISCONTINUED | OUTPATIENT
Start: 2022-05-12 | End: 2022-05-13 | Stop reason: HOSPADM

## 2022-05-12 RX ORDER — TALC
6 POWDER (GRAM) TOPICAL NIGHTLY PRN
Status: DISCONTINUED | OUTPATIENT
Start: 2022-05-12 | End: 2022-05-13 | Stop reason: HOSPADM

## 2022-05-12 RX ORDER — IBUPROFEN 400 MG/1
800 TABLET ORAL EVERY 8 HOURS PRN
Status: DISCONTINUED | OUTPATIENT
Start: 2022-05-12 | End: 2022-05-13 | Stop reason: HOSPADM

## 2022-05-12 RX ORDER — LABETALOL HYDROCHLORIDE 5 MG/ML
20 INJECTION, SOLUTION INTRAVENOUS ONCE
Status: DISCONTINUED | OUTPATIENT
Start: 2022-05-12 | End: 2022-05-12

## 2022-05-12 RX ORDER — SODIUM CHLORIDE 0.9 % (FLUSH) 0.9 %
10 SYRINGE (ML) INJECTION
Status: DISCONTINUED | OUTPATIENT
Start: 2022-05-12 | End: 2022-05-13 | Stop reason: HOSPADM

## 2022-05-12 RX ORDER — HYDROCODONE BITARTRATE AND ACETAMINOPHEN 10; 325 MG/1; MG/1
1 TABLET ORAL EVERY 4 HOURS PRN
Status: CANCELLED | OUTPATIENT
Start: 2022-05-12

## 2022-05-12 RX ORDER — CYCLOBENZAPRINE HCL 5 MG
5 TABLET ORAL DAILY PRN
Status: DISCONTINUED | OUTPATIENT
Start: 2022-05-12 | End: 2022-05-13 | Stop reason: HOSPADM

## 2022-05-12 RX ORDER — ESCITALOPRAM OXALATE 10 MG/1
20 TABLET ORAL DAILY
Status: DISCONTINUED | OUTPATIENT
Start: 2022-05-12 | End: 2022-05-13 | Stop reason: HOSPADM

## 2022-05-12 RX ORDER — DOCUSATE SODIUM 100 MG/1
200 CAPSULE, LIQUID FILLED ORAL 2 TIMES DAILY PRN
Status: CANCELLED | OUTPATIENT
Start: 2022-05-12

## 2022-05-12 RX ORDER — PROCHLORPERAZINE EDISYLATE 5 MG/ML
5 INJECTION INTRAMUSCULAR; INTRAVENOUS EVERY 6 HOURS PRN
Status: DISCONTINUED | OUTPATIENT
Start: 2022-05-12 | End: 2022-05-13 | Stop reason: HOSPADM

## 2022-05-12 RX ORDER — OXYTOCIN/RINGER'S LACTATE 30/500 ML
95 PLASTIC BAG, INJECTION (ML) INTRAVENOUS ONCE
Status: CANCELLED | OUTPATIENT
Start: 2022-05-12 | End: 2022-05-12

## 2022-05-12 RX ADMIN — ACETAMINOPHEN 650 MG: 325 TABLET, FILM COATED ORAL at 12:05

## 2022-05-12 RX ADMIN — LABETALOL HYDROCHLORIDE 300 MG: 200 TABLET, FILM COATED ORAL at 12:05

## 2022-05-12 RX ADMIN — NIFEDIPINE 10 MG: 10 CAPSULE ORAL at 09:05

## 2022-05-12 RX ADMIN — LABETALOL HYDROCHLORIDE 300 MG: 200 TABLET, FILM COATED ORAL at 08:05

## 2022-05-12 RX ADMIN — Medication 6 MG: at 08:05

## 2022-05-12 NOTE — LACTATION NOTE
This note was copied from a baby's chart.  Mother/Baby being followed by lactation.  Infant awake and alert with hunger cues- sucking on hands. Infant placed football hold to left breast. Shallow latch with brief intermittent suckling. Large nipple vs small infant mouth. Encouraged practice latching  With early cues x 10 min then offer full volume supplement of ebm or formula after breast. Discussed transition to exclusive at the breast feeds as infant shows signs of good latch and transfer of milk at breast.   Feeding plan for home:  Encouraged mother to continue transition to exclusive breast feeding under the guidance of the Pediatrician by increasing frequency and duration at breast, and gradually decreasing supplement volume; encouraged mother to put baby to breast on demand when early hunger cues are observed 3-4 times in 24-hour period; (progress to more at the breast feeds as infant shows more interest and ability) if signs of an effective latch and active milk transfer are noted, mother to allow baby to nurse 10-15 minutes; mother to use compression with breast feeding as discussed; mother to continue supplement of expressed breast milk (or formula) as needed until exclusive breast feeding is well established; mother to closely monitor for signs that baby is getting enough (hydration, calories) at breast AEB at least 5-6 heavy, wet diapers/day, 3-4 loose, yellow seedy stools/day, and a continued weight gain of 5-7 ounces/week; mother to follow-up with the Pediatrician for weight checks and as scheduled/needed.  Mom encouraged to double pump until empty after breast feeding and at least 8 x/day until infant fully established at breast and a full breast milk supply is established.    Early feeding cues: Sucking on fingers or hands or bringing hands toward the mouth                                  Sucking motions with mouth or tongue                                  Rooting or turning toward an object that  brushes your baby's mouth                                  Acting fretful           Try to latch the baby onto the breast until deep latch occurs or until 10 minutes pass. If unable to latch baby onto breasts or you do not see or hear any signs that baby is getting milk from your breast, bottle feed your baby.  Ssuu Allen, BSN, RNC, CLC, IBCLC

## 2022-05-12 NOTE — ED NOTES
05/12/22 0843 05/12/22 0900   Vital Signs   BP (!) 179/83 (!) 174/93   Dr. Rodriguez and Dr. Martin notified. MD will place order for antihypertensive.

## 2022-05-12 NOTE — ED PROVIDER NOTES
Encounter Date: 2022    Marcie Cheek is a 31 y.o. [id number] PPD#4 presents complaining of elevated BP while visiting her baby in NICU. Denies HA, SOB, cough, chest pain, vision changes, RUQ pain, edema.    Of note, pt meds up-titrated yesterday morning to labetalol 300 BID (from labetalol 200BID) and given 4 day Rx of lasix.       This IUP was complicated by cHTN w/ DIANE w/ SF (HA, BP), DM2, MO, anemia, depression/anxiety, RNI. She was started on labetalol 300 BID yesterday morning, last took this morning. Pt reports compliance with her meds.     History     Chief Complaint   Patient presents with    Hypertension     HPI  Review of patient's allergies indicates:  No Known Allergies  Past Medical History:   Diagnosis Date    Anxiety     Depression     Diabetes mellitus affecting pregnancy in third trimester 3/14/2022    Hypertension      Past Surgical History:   Procedure Laterality Date    HYSTEROSCOPY      WISDOM TOOTH EXTRACTION       Family History   Problem Relation Age of Onset    Thyroid disease Maternal Grandmother     Breast cancer Neg Hx     Colon cancer Neg Hx     Ovarian cancer Neg Hx      Social History     Tobacco Use    Smoking status: Never Smoker    Smokeless tobacco: Never Used   Substance Use Topics    Alcohol use: Not Currently    Drug use: Never     Review of Systems   Constitutional: Positive for fatigue. Negative for chills, fever and unexpected weight change.   HENT: Negative for sneezing and sore throat.    Respiratory: Negative for cough and shortness of breath.    Cardiovascular: Negative for chest pain and palpitations.   Gastrointestinal: Negative for abdominal distention, abdominal pain, constipation, diarrhea, nausea and vomiting.   Endocrine: Negative for cold intolerance and heat intolerance.   Genitourinary: Negative for difficulty urinating, dyspareunia, dysuria, genital sores, pelvic pain, urgency and vaginal pain.   Neurological: Negative for  dizziness, syncope and weakness.   Hematological: Does not bruise/bleed easily.       Physical Exam     Initial Vitals   BP Pulse Resp Temp SpO2   05/12/22 0843 05/12/22 0843 05/12/22 0900 05/12/22 0859 05/12/22 0900   (!) 179/83 80 16 98.4 °F (36.9 °C) 99 %      MAP       --                Physical Exam    Constitutional: She appears well-developed and well-nourished. No distress.   HENT:   Head: Normocephalic and atraumatic.   Eyes: Conjunctivae are normal. Pupils are equal, round, and reactive to light.   Neck:   Normal range of motion.  Cardiovascular: Normal rate.   Pulmonary/Chest: No respiratory distress.   Abdominal: There is no abdominal tenderness. There is no rebound and no guarding.   Musculoskeletal:         General: No tenderness or edema. Normal range of motion.      Cervical back: Normal range of motion.     Neurological: She is alert and oriented to person, place, and time.   Skin: Skin is warm and dry.   Psychiatric: She has a normal mood and affect. Thought content normal.         ED Course   Procedures  Labs Reviewed          Imaging Results    None          Medications   cyclobenzaprine tablet 5 mg (has no administration in time range)   EScitalopram oxalate tablet 20 mg (has no administration in time range)   furosemide tablet 20 mg (has no administration in time range)   sodium chloride 0.9% flush 10 mL (has no administration in time range)   ondansetron disintegrating tablet 8 mg (has no administration in time range)   prochlorperazine injection Soln 5 mg (has no administration in time range)   ibuprofen tablet 800 mg (has no administration in time range)   acetaminophen tablet 650 mg (has no administration in time range)   labetaloL tablet 300 mg (has no administration in time range)   NIFEdipine capsule 10 mg (10 mg Oral Given 5/12/22 0911)     Medical Decision Making:   ED Management:  Two sustained severe range BP  -procardia 10 given  All other VSS and WNL  -BP WNL s/p procardia  CXR  completed  Yesterday:   Impression:  Bilateral interstitial opacities, most pronounced at the lung bases, which may relate to interstitial edema.  Possible trace bilateral pleural effusions.  Clinical correlation advised.>>pt given rx of 4 days of lasix   Labs yesterday all WNL  Will admit pt for BP monitoring and up-titration of meds as needed  Pt agrees with plan  All questions answered     Other:   I discussed test(s) with the performing physician.  I have discussed this case with another health care provider.            Attending Attestation:   Physician Attestation Statement for Resident:  As the supervising MD   Physician Attestation Statement: I have personally seen and examined this patient.   I agree with the above history. -:   As the supervising MD I agree with the above PE.    As the supervising MD I agree with the above treatment, course, plan, and disposition.   -: Given repeat JENNI visit with continued severe range BP that require treatment, agree with planned admission for BP control.  No indication for repeat magnesium.  I was personally present during the critical portions of the procedure(s) performed by the resident and was immediately available in the ED to provide services and assistance as needed during the entire procedure.                       Ilsa Mensah MD  OBGYN PGY-1    Clinical Impression:   Final diagnoses:  [I10] Hypertension, unspecified type (Primary)                 Ilsa Mensah MD  Resident  05/12/22 1106       Jovita Rodriguez MD  05/12/22 1600

## 2022-05-13 VITALS
OXYGEN SATURATION: 99 % | HEART RATE: 83 BPM | DIASTOLIC BLOOD PRESSURE: 58 MMHG | RESPIRATION RATE: 18 BRPM | SYSTOLIC BLOOD PRESSURE: 123 MMHG | TEMPERATURE: 98 F

## 2022-05-13 LAB
POCT GLUCOSE: 74 MG/DL (ref 70–110)
POCT GLUCOSE: 76 MG/DL (ref 70–110)
POCT GLUCOSE: 79 MG/DL (ref 70–110)

## 2022-05-13 PROCEDURE — 25000003 PHARM REV CODE 250: Performed by: STUDENT IN AN ORGANIZED HEALTH CARE EDUCATION/TRAINING PROGRAM

## 2022-05-13 PROCEDURE — G0378 HOSPITAL OBSERVATION PER HR: HCPCS

## 2022-05-13 RX ORDER — LABETALOL 200 MG/1
400 TABLET, FILM COATED ORAL 3 TIMES DAILY
Qty: 180 TABLET | Refills: 11 | Status: SHIPPED | OUTPATIENT
Start: 2022-05-13 | End: 2022-06-21

## 2022-05-13 RX ORDER — DEXTROSE 4 G
TABLET,CHEWABLE ORAL
Qty: 1 EACH | Refills: 0 | Status: SHIPPED | OUTPATIENT
Start: 2022-05-13 | End: 2022-06-21

## 2022-05-13 RX ORDER — LABETALOL 200 MG/1
400 TABLET, FILM COATED ORAL 3 TIMES DAILY
Status: DISCONTINUED | OUTPATIENT
Start: 2022-05-13 | End: 2022-05-13 | Stop reason: HOSPADM

## 2022-05-13 RX ADMIN — ESCITALOPRAM OXALATE 20 MG: 10 TABLET ORAL at 09:05

## 2022-05-13 RX ADMIN — LABETALOL HYDROCHLORIDE 400 MG: 200 TABLET, FILM COATED ORAL at 03:05

## 2022-05-13 RX ADMIN — FUROSEMIDE 20 MG: 20 TABLET ORAL at 09:05

## 2022-05-13 RX ADMIN — LABETALOL HYDROCHLORIDE 400 MG: 200 TABLET, FILM COATED ORAL at 09:05

## 2022-05-13 NOTE — PLAN OF CARE
Problem: Adult Inpatient Plan of Care  Goal: Plan of Care Review  Outcome: Met  Goal: Patient-Specific Goal (Individualized)  Outcome: Met  Goal: Absence of Hospital-Acquired Illness or Injury  Outcome: Met  Goal: Optimal Comfort and Wellbeing  Outcome: Met  Goal: Readiness for Transition of Care  Outcome: Met     Problem: Diabetes Comorbidity  Goal: Blood Glucose Level Within Targeted Range  Outcome: Met     Problem: Bariatric Environmental Safety  Goal: Safety Maintained with Care  Outcome: Met     Problem: Hypertension Acute  Goal: Blood Pressure Within Desired Range  Outcome: Met     Problem: Adjustment to Role Transition (Postpartum Vaginal Delivery)  Goal: Successful Maternal Role Transition  Outcome: Met     Problem: Bleeding (Postpartum Vaginal Delivery)  Goal: Hemostasis  Outcome: Met     Problem: Infection (Postpartum Vaginal Delivery)  Goal: Absence of Infection Signs/Symptoms  Outcome: Met     Problem: Pain (Postpartum Vaginal Delivery)  Goal: Acceptable Pain Control  Outcome: Met     Problem: Urinary Retention (Postpartum Vaginal Delivery)  Goal: Effective Urinary Elimination  Outcome: Met

## 2022-05-13 NOTE — PROGRESS NOTES
POSTPARTUM PROGRESS NOTE  Post Partum Readmit  Subjective:     PPD/POD#: 6   Procedure:    EGA: 36w4d   N/V: No   F/C: No   Abd Pain: Mild, well-controlled with oral pain medication   Lochia: Mild   Voiding: Yes   Ambulating: Yes   Bowel fnc: Yes   Breastfeeding: Yes   Contraception: Per primary OB   Circumcision: N/A, female-NICU     Denies HA, vision changes, RUQ pain, SOB, chest pain, palpitations, edema.    Objective:      Temp:  [97.8 °F (36.6 °C)-98.4 °F (36.9 °C)] 97.8 °F (36.6 °C)  Pulse:  [80-97] 87  Resp:  [16-18] 17  SpO2:  [94 %-99 %] 96 %  BP: (127-179)/(59-93) 141/77    Lung: Normal respiratory effort   Abdomen: Soft, appropriately tender   Uterus: Firm, no fundal tenderness   Incision: N/A   : Deferred   Extremities: No edema     Lab Review    Recent Labs   Lab 22  1142 22  0632 22  1455 22  0252    137  --  140   K 5.6* 4.2  --  4.4    105  --  107   CO2 18* 20*  --  21*   BUN 11 8  --  14   CREATININE 0.7 0.7  --  0.8   * 100  --  75   PROT 7.1 6.3  --  6.3   BILITOT 0.3 0.6  --  0.2   ALKPHOS 125 129  --  102   ALT 16 13  --  12   AST 52* 19  --  23   MG  --   --  5.3*  --        Recent Labs   Lab 22  0632 22  2235 22  0526 22  0252   WBC 15.04*  --  17.02* 9.54   HGB 11.6*  --  10.6* 9.6*   HCT 35.1* 54 32.4* 29.9*   MCV 92  --  92 95     --  229 182         I/O  No intake or output data in the 24 hours ending 22 0613     Assessment and Plan:   Postpartum care:  - Patient doing well.  - Continue routine management and advances.    1. cHTN with Superimposed Pre-Eclampsia w/ Severe Features:   - Asymptomatic   -BP: (127-179)/(59-93) 141/77  - Received procardia 10 in OB ED, pressures responded   - Home labetalol 300 BID>up-titrated to 300 TID> 400 TID (new )  - Magnesium not indicated due to no lab abnormalities and no symptoms; continues to deny SOB, headaches, RUQ pain, vision changes     2. T2DM   -POCT  glucose 74<86<92<85<96  -Preprandial and fasting glucoses ordered  -NPH 22 during pregnancy, NPH 10u nightly post partum        3. Morbid Obesity  - Encourage ambulation  - SCDs in place      4. Anemia   - Continue home fe/colace   -asymptomatic     5. Depression/ Anxiety:   - Continue home enzo Mensah MD  OBGYN PGY-1

## 2022-05-13 NOTE — PLAN OF CARE
Patient ambulating and voiding independently. Postpartum assessment WNL. Patient with scant amount of bleeding. Unable to palpate fundus. Patient denies dizziness HA, or blurred vision. BG WNL. Denies pain. Will continue to monitor.

## 2022-05-13 NOTE — NURSING
1830: Discharge paperwork gone over. All questions & concerns addressed. No sign of distress. Pt stable. Pt discharged to home at this time.

## 2022-05-13 NOTE — DISCHARGE SUMMARY
Delivery Discharge Summary  Obstetrics      Primary OB Clinician: Sun Urrutia MD      Admission date: 2022  Discharge date: 2022    Disposition: To home, self care    Discharge Diagnosis List:      Patient Active Problem List   Diagnosis    Foot dermatitis    Obesity affecting pregnancy in third trimester    Rubella non-immune status, antepartum    Elevated liver enzymes    Depression affecting pregnancy    Diabetes mellitus affecting pregnancy in third trimester    Chronic hypertension affecting pregnancy    Pre-eclampsia in third trimester     (spontaneous vaginal delivery)    Chronic hypertension with superimposed preeclampsia       Procedure:     Hospital Course:  Marcie Cheek is a 31 y.o. now , PPD #5 who was re-admitted on 2022 for elevated blood pressures. Pt's pregnancy was complicated by cHTN with SI preE w/ SF (BP) for which she was on magnesium throughout her labor and until 24 hours PP.     Pt's HTN meds were up-titrated from labetalol 200 BID to labetalol 400 TID. Pt remained asymptomatic throughout her admission.      On discharge day, patient's pain is controlled with oral pain medications. BP well controlled with labetalol 400 TID. Pt is tolerating ambulation without SOB or CP, and regular diet without N/V. Her blood sugars were well controlled on NPH 10 units qPM. Reports lochia is mild. Denies any HA, vision changes, F/C, LE swelling. Denies any breast pain/soreness.    Pt in stable condition and ready for discharge. She has been instructed to start and/or continue medications and follow up with her obstetrics provider as listed below.    Pertinent studies:  CBC  Recent Labs   Lab 22  0632 22  2235 22  0526 22  0252   WBC 15.04*  --  17.02* 9.54   HGB 11.6*  --  10.6* 9.6*   HCT 35.1* 54 32.4* 29.9*   MCV 92  --  92 95     --  229 182          Immunization History   Administered Date(s) Administered    Tdap  03/23/2022        Delivery:    Episiotomy: None   Lacerations: None   Repair suture:     Repair # of packets: 2   Blood loss (ml):       Birth information:  YOB: 2023   Time of birth: 10:14 PM   Sex: female   Delivery type: Vaginal, Spontaneous   Gestational Age: 36w4d    Delivery Clinician:      Other providers:       Additional  information:  Forceps:    Vacuum:    Breech:    Observed anomalies      Living?:           APGARS  One minute Five minutes Ten minutes   Skin color:         Heart rate:         Grimace:         Muscle tone:         Breathing:         Totals: 4  6  8      Placenta: Delivered:       appearance      Patient Instructions:   Current Discharge Medication List      START taking these medications    Details   blood-glucose meter Misc To check BG 4 times daily, to use with insurance preferred meter  Qty: 1 each, Refills: 0    Associated Diagnoses: Diet controlled gestational diabetes mellitus (GDM) in second trimester      insulin  unit/mL injection Inject 10 Units into the skin every evening.  Qty: 3 mL, Refills: 0         CONTINUE these medications which have CHANGED    Details   labetaloL (NORMODYNE) 200 MG tablet Take 2 tablets (400 mg total) by mouth 3 (three) times daily.  Qty: 180 tablet, Refills: 11    Comments: .         CONTINUE these medications which have NOT CHANGED    Details   acetaminophen (TYLENOL) 325 MG tablet Take 2 tablets (650 mg total) by mouth every 6 (six) hours as needed.  Qty: 60 tablet, Refills: 2      cyclobenzaprine (FLEXERIL) 10 MG tablet Take 0.5 tablets (5 mg total) by mouth daily as needed for Muscle spasms.  Qty: 5 tablet, Refills: 0      docusate sodium (COLACE) 100 MG capsule Take 2 capsules (200 mg total) by mouth 2 (two) times daily as needed for Constipation.  Qty: 60 capsule, Refills: 2      EScitalopram oxalate (LEXAPRO) 20 MG tablet Take 1 tablet (20 mg total) by mouth once daily.  Qty: 30 tablet, Refills: 2      famotidine (PEPCID  ORAL) Take by mouth as needed.      furosemide (LASIX) 20 MG tablet Take 1 tablet (20 mg total) by mouth once daily. for 4 days  Qty: 4 tablet, Refills: 0      ibuprofen (ADVIL,MOTRIN) 800 MG tablet Take 1 tablet (800 mg total) by mouth every 8 (eight) hours as needed for Pain (Take scheduled for next 5-7 days, then as needed for pain).  Qty: 60 tablet, Refills: 1      magnesium oxide 400 mg magnesium Tab Take 400 mg by mouth every 12 (twelve) hours as needed (headaches).  Qty: 10 tablet, Refills: 0      PNV no.1/iron,carb/docus/folic (PREN VIT COMB.1-IRON CB-FA-DSS ORAL) Take by mouth.         STOP taking these medications       aspirin (ECOTRIN) 81 MG EC tablet Comments:   Reason for Stopping:         blood-glucose meter kit Comments:   Reason for Stopping:         insulin lispro (HUMALOG KWIKPEN INSULIN) 100 unit/mL pen Comments:   Reason for Stopping:         insulin NPH isoph U-100 human (HUMULIN N NPH INSULIN KWIKPEN) 100 unit/mL (3 mL) InPn Comments:   Reason for Stopping:               Discharge Procedure Orders   Diet Adult Regular     Diet Adult Regular     Ice to affected area     Other restrictions (specify):   Order Comments: Do not drive while on narcotics. Do not drive until you feel comfortable placing your foot on the break without pain/discomfort.     Pelvic Rest   Order Comments: Do not put anything in your vagina until evaluated by your doctor at your next visit. This includes douching, tampons, sex.     Remove dressing in 24 hours     Notify your health care provider if you experience any of the following:  temperature >100.4     Notify your health care provider if you experience any of the following:  persistent nausea and vomiting or diarrhea     Notify your health care provider if you experience any of the following:  severe uncontrolled pain     Notify your health care provider if you experience any of the following:  redness, tenderness, or signs of infection (pain, swelling, redness, odor  or green/yellow discharge around incision site)     Notify your health care provider if you experience any of the following:  difficulty breathing or increased cough     Notify your health care provider if you experience any of the following:  severe persistent headache     Notify your health care provider if you experience any of the following:  worsening rash     Notify your health care provider if you experience any of the following:  persistent dizziness, light-headedness, or visual disturbances     Notify your health care provider if you experience any of the following:  increased confusion or weakness     Lifting restrictions   Order Comments: Nothing heavier than baby for 6 weeks     No driving until:   Order Comments: Until not taking narcotics     Pelvic Rest   Order Comments: Nothing in the vagina for 6 weeks     Notify your health care provider if you experience any of the following:  temperature >100.4     Notify your health care provider if you experience any of the following:  persistent nausea and vomiting or diarrhea     Notify your health care provider if you experience any of the following:  severe uncontrolled pain     Notify your health care provider if you experience any of the following:  severe persistent headache     Notify your health care provider if you experience any of the following:   Order Comments: Heavy vaginal bleeding saturating one pad/hour for two hours     Activity as tolerated     Activity as tolerated        Follow-up Information     Holley Hernandez MD. Schedule an appointment as soon as possible for a visit in 1 week(s).    Specialty: Obstetrics and Gynecology  Why: BP check  Contact information:  [address] [address] [phone]                          Ilsa Mensah MD  OBGYN PGY-1    Seen and examined.  Agree with note.  All questions answered  WILLIAM Urrutia MD

## 2022-05-13 NOTE — NURSING
The following note was sent to dr santana staff on 5/10: Hi, I wanted to let dr rene know my thought process on ms raymundo and let her change things if she'd like. Pt was very flat affect when reviewing d/c teaching, I see she has an appt on 5/18 w/dr robins and a mood checkk w/dr rene on 5/23. Pt says she has a connected mom but one is not listed in the computer. I went ahead and made her an appt at the walk in clinic on 5/16 for a bp check and a bg log review (I encouraged her to check bg fasting and pre-meals as per md note) at home. Thank you.

## 2022-05-13 NOTE — LACTATION NOTE
This note was copied from a baby's chart.  LC present for latch assist:  Mahendra VERY sleepy. We attempted to arouse with unswaddling, weight on scale, diaper change and temperature taking. Despite all the above and parents' best efforts, she would not arouse,nor did she show ANY feeding cues. Mom held S2S while full gavage feeding infused. Discussed IDF protocol-handout to parents.     NICU Lactation Discharge Note:  Latch assist: Plan to try again at 1700 today.  Discussed importance of a deep latch, signs of a good latch, signs of milk transfer, and how to know if baby is getting enough.  Feeding plan for home: Mom to continue latching for practice and provide full ordered feeding volumes of ebm (formula as needed). Mom has lactation f/u outpatient scheduled with PETEY garcia and plans to have assist from lactation and pediatrician to move toward more exclusive breast feeding over time.   Completed NICU lactation discharge teaching with good understanding verbalized by mother.  Provided mother with written handouts to reinforce verbal instructions.  Encouraged mother to participate in a breast feeding support group to facilitate meeting her breast feeding goals.  Provided mother with list of lactation community resources as well as NICU lactation contact numbers.

## 2022-05-13 NOTE — LACTATION NOTE
This note was copied from a baby's chart.  Mahendra much more awake,showing feeding cues:  She did successfully latch deeply on both left and right breasts on/off,time limited to 10min with few audible swallows (not sustained or rhythmic yet). Encouraged mom to practice 2-3 times daily up to 10min at breastfeeding(no scale use until she becomes rhythmic/sustained for >10min) followed by bottle feeding as per feeding cues. IDF reiterated(handout provided). Ongoing support/encouragement provided.

## 2022-05-25 ENCOUNTER — POSTPARTUM VISIT (OUTPATIENT)
Dept: OBSTETRICS AND GYNECOLOGY | Facility: CLINIC | Age: 32
End: 2022-05-25
Payer: COMMERCIAL

## 2022-05-25 VITALS
WEIGHT: 264.56 LBS | SYSTOLIC BLOOD PRESSURE: 138 MMHG | DIASTOLIC BLOOD PRESSURE: 82 MMHG | HEIGHT: 67 IN | BODY MASS INDEX: 41.52 KG/M2

## 2022-05-25 DIAGNOSIS — O14.93 PRE-ECLAMPSIA IN THIRD TRIMESTER: ICD-10-CM

## 2022-05-25 PROCEDURE — 0502F SUBSEQUENT PRENATAL CARE: CPT | Mod: CPTII,S$GLB,, | Performed by: STUDENT IN AN ORGANIZED HEALTH CARE EDUCATION/TRAINING PROGRAM

## 2022-05-25 PROCEDURE — 99999 PR PBB SHADOW E&M-EST. PATIENT-LVL III: CPT | Mod: PBBFAC,,, | Performed by: STUDENT IN AN ORGANIZED HEALTH CARE EDUCATION/TRAINING PROGRAM

## 2022-05-25 PROCEDURE — 0502F PR SUBSEQUENT PRENATAL CARE: ICD-10-PCS | Mod: CPTII,S$GLB,, | Performed by: STUDENT IN AN ORGANIZED HEALTH CARE EDUCATION/TRAINING PROGRAM

## 2022-05-25 PROCEDURE — 99999 PR PBB SHADOW E&M-EST. PATIENT-LVL III: ICD-10-PCS | Mod: PBBFAC,,, | Performed by: STUDENT IN AN ORGANIZED HEALTH CARE EDUCATION/TRAINING PROGRAM

## 2022-05-25 NOTE — PROGRESS NOTES
Ike presents for BP and mood check  She had   without complications  Postpartum course was complicated by difficulty getting BP under control  She is now on no medications and doing fantastic. BP And glucoses have been normal.  Feeling well overall. Pain none. Bleeding minimal. No fevers, N/V. Normal bowel and bladder.   Pumping is going well. No breast issues.  Feels good from a mood perspective. Taking her SSRI as prescribed.    Reviewed recs for 2hour GTT  RTC for routine PP on   Holley Hernandez M.D.

## 2022-06-06 ENCOUNTER — PATIENT MESSAGE (OUTPATIENT)
Dept: OBSTETRICS AND GYNECOLOGY | Facility: CLINIC | Age: 32
End: 2022-06-06
Payer: COMMERCIAL

## 2022-06-21 ENCOUNTER — LAB VISIT (OUTPATIENT)
Dept: LAB | Facility: OTHER | Age: 32
End: 2022-06-21
Attending: STUDENT IN AN ORGANIZED HEALTH CARE EDUCATION/TRAINING PROGRAM
Payer: COMMERCIAL

## 2022-06-21 ENCOUNTER — POSTPARTUM VISIT (OUTPATIENT)
Dept: OBSTETRICS AND GYNECOLOGY | Facility: CLINIC | Age: 32
End: 2022-06-21
Payer: COMMERCIAL

## 2022-06-21 VITALS
BODY MASS INDEX: 40.87 KG/M2 | HEIGHT: 67 IN | SYSTOLIC BLOOD PRESSURE: 116 MMHG | WEIGHT: 260.38 LBS | DIASTOLIC BLOOD PRESSURE: 80 MMHG

## 2022-06-21 DIAGNOSIS — Z30.09 ENCOUNTER FOR GENERAL COUNSELING AND ADVICE ON CONTRACEPTIVE MANAGEMENT: ICD-10-CM

## 2022-06-21 DIAGNOSIS — O24.913 DIABETES MELLITUS AFFECTING PREGNANCY IN THIRD TRIMESTER: ICD-10-CM

## 2022-06-21 DIAGNOSIS — O99.820 GBS (GROUP B STREPTOCOCCUS CARRIER), +RV CULTURE, CURRENTLY PREGNANT: ICD-10-CM

## 2022-06-21 LAB
GLUCOSE SERPL-MCNC: 130 MG/DL
GLUCOSE SERPL-MCNC: 86 MG/DL (ref 70–110)
GLUCOSE SERPL-MCNC: 98 MG/DL

## 2022-06-21 PROCEDURE — 87624 HPV HI-RISK TYP POOLED RSLT: CPT | Performed by: STUDENT IN AN ORGANIZED HEALTH CARE EDUCATION/TRAINING PROGRAM

## 2022-06-21 PROCEDURE — 0503F POSTPARTUM CARE VISIT: CPT | Mod: CPTII,S$GLB,, | Performed by: STUDENT IN AN ORGANIZED HEALTH CARE EDUCATION/TRAINING PROGRAM

## 2022-06-21 PROCEDURE — 88175 CYTOPATH C/V AUTO FLUID REDO: CPT | Performed by: STUDENT IN AN ORGANIZED HEALTH CARE EDUCATION/TRAINING PROGRAM

## 2022-06-21 PROCEDURE — 82951 GLUCOSE TOLERANCE TEST (GTT): CPT | Performed by: STUDENT IN AN ORGANIZED HEALTH CARE EDUCATION/TRAINING PROGRAM

## 2022-06-21 PROCEDURE — 99999 PR PBB SHADOW E&M-EST. PATIENT-LVL II: ICD-10-PCS | Mod: PBBFAC,,, | Performed by: STUDENT IN AN ORGANIZED HEALTH CARE EDUCATION/TRAINING PROGRAM

## 2022-06-21 PROCEDURE — 99999 PR PBB SHADOW E&M-EST. PATIENT-LVL II: CPT | Mod: PBBFAC,,, | Performed by: STUDENT IN AN ORGANIZED HEALTH CARE EDUCATION/TRAINING PROGRAM

## 2022-06-21 PROCEDURE — 0503F PR POSTPARTUM CARE VISIT: ICD-10-PCS | Mod: CPTII,S$GLB,, | Performed by: STUDENT IN AN ORGANIZED HEALTH CARE EDUCATION/TRAINING PROGRAM

## 2022-06-21 PROCEDURE — 36415 COLL VENOUS BLD VENIPUNCTURE: CPT | Performed by: STUDENT IN AN ORGANIZED HEALTH CARE EDUCATION/TRAINING PROGRAM

## 2022-06-21 RX ORDER — LACTIC ACID, L-, CITRIC ACID MONOHYDRATE, AND POTASSIUM BITARTRATE 90; 50; 20 MG/5G; MG/5G; MG/5G
1 GEL VAGINAL ONCE AS NEEDED
Qty: 120 G | Refills: 11 | Status: SHIPPED | OUTPATIENT
Start: 2022-06-21 | End: 2022-06-21

## 2022-06-21 NOTE — PROGRESS NOTES
History & Physical  Gynecology      SUBJECTIVE:     Chief Complaint: No chief complaint on file.       History of Present Illness:    Marcie Cheek is here for her postaprtum visit after  delivery on . Delivery and postpartum course was uncomplicated other than swelling and re admit for pre E. Pregnancy was complicated by GDM, Obesity, cHTN, anxiety. She is feeling great today. She denies bleeding, pain, F/C, N/V. Normal bowel and bladder function. Breastfeeding is going well. Has had sex since the delivery and had no issues. She denies si/sx of PPD.  Baby is doing well. Contraceptive plans Phexxi.         Review of patient's allergies indicates:  No Known Allergies    Past Medical History:   Diagnosis Date    Anxiety     Depression     Diabetes mellitus affecting pregnancy in third trimester 3/14/2022    Hypertension      Past Surgical History:   Procedure Laterality Date    HYSTEROSCOPY      WISDOM TOOTH EXTRACTION       OB History        1    Para   1    Term           1    AB        Living   1       SAB        IAB        Ectopic        Multiple   0    Live Births   1               Family History   Problem Relation Age of Onset    Thyroid disease Maternal Grandmother     Breast cancer Neg Hx     Colon cancer Neg Hx     Ovarian cancer Neg Hx      Social History     Tobacco Use    Smoking status: Never Smoker    Smokeless tobacco: Never Used   Substance Use Topics    Alcohol use: Not Currently    Drug use: Never       Current Outpatient Medications   Medication Sig    acetaminophen (TYLENOL) 325 MG tablet Take 2 tablets (650 mg total) by mouth every 6 (six) hours as needed. (Patient not taking: Reported on 2022)    blood-glucose meter Misc To check BG 4 times daily, to use with insurance preferred meter (Patient not taking: Reported on 2022)    cyclobenzaprine (FLEXERIL) 10 MG tablet Take 0.5 tablets (5 mg total) by mouth daily as needed for Muscle spasms.  (Patient not taking: Reported on 5/25/2022)    docusate sodium (COLACE) 100 MG capsule Take 2 capsules (200 mg total) by mouth 2 (two) times daily as needed for Constipation. (Patient not taking: Reported on 5/25/2022)    EScitalopram oxalate (LEXAPRO) 20 MG tablet Take 1 tablet (20 mg total) by mouth once daily.    famotidine (PEPCID ORAL) Take by mouth as needed.    furosemide (LASIX) 20 MG tablet Take 1 tablet (20 mg total) by mouth once daily. for 4 days    ibuprofen (ADVIL,MOTRIN) 800 MG tablet Take 1 tablet (800 mg total) by mouth every 8 (eight) hours as needed for Pain (Take scheduled for next 5-7 days, then as needed for pain).    insulin  unit/mL injection Inject 10 Units into the skin every evening. (Patient not taking: Reported on 5/25/2022)    labetaloL (NORMODYNE) 200 MG tablet Take 2 tablets (400 mg total) by mouth 3 (three) times daily. (Patient not taking: Reported on 5/25/2022)    magnesium oxide 400 mg magnesium Tab Take 400 mg by mouth every 12 (twelve) hours as needed (headaches). (Patient not taking: Reported on 5/25/2022)    PNV no.1/iron,carb/docus/folic (PREN VIT COMB.1-IRON CB-FA-DSS ORAL) Take by mouth.     No current facility-administered medications for this visit.         Review of Systems:  Review of Systems   Constitutional: Negative for activity change, appetite change, chills and fever.   Respiratory: Negative for shortness of breath.    Cardiovascular: Negative for chest pain.   Gastrointestinal: Negative for abdominal pain, blood in stool, constipation, diarrhea, nausea and vomiting.   Endocrine: Negative for diabetes.   Genitourinary: Negative for dyspareunia, dysuria, hematuria, pelvic pain, vaginal bleeding, vaginal discharge, vaginal pain and postcoital bleeding.   Integumentary:  Negative for breast mass.   Neurological: Negative for headaches.   Psychiatric/Behavioral: Negative for depression. The patient is not nervous/anxious.    Breast: Negative for mass  and mastodynia       OBJECTIVE:     Physical Exam:  Physical Exam  Vitals reviewed.   Constitutional:       General: She is not in acute distress.     Appearance: She is well-developed. She is not diaphoretic.   HENT:      Head: Normocephalic and atraumatic.   Eyes:      Conjunctiva/sclera: Conjunctivae normal.   Cardiovascular:      Rate and Rhythm: Normal rate.   Pulmonary:      Effort: Pulmonary effort is normal.   Abdominal:      General: There is no distension.      Palpations: Abdomen is soft. There is no mass.      Tenderness: There is no abdominal tenderness. There is no guarding or rebound.   Genitourinary:     Labia:         Right: No rash, tenderness, lesion or injury.         Left: No rash, tenderness, lesion or injury.       Vagina: No signs of injury and foreign body. No vaginal discharge, erythema, tenderness or bleeding.      Cervix: No cervical motion tenderness, discharge or friability.      Uterus: Not deviated, not enlarged, not fixed and not tender.       Adnexa:         Right: No mass, tenderness or fullness.          Left: No mass, tenderness or fullness.     Musculoskeletal:         General: Normal range of motion.      Cervical back: Normal range of motion.   Skin:     General: Skin is warm and dry.   Neurological:      Mental Status: She is alert.           ASSESSMENT:       ICD-10-CM ICD-9-CM    1.  (spontaneous vaginal delivery)  O80 650           Plan:      Assessment and Plan:  Pt is doing well postpartum. No complaints.  Laceration healing well  PPD screen negative. Pt to taper down lexapro  T2DM screen underway  Pap and cot test collected  BP good without meds  Contraceptive plans:  The patient elected to use the Phexxi. Proper use discussed as well as information on the effects of this medication on breastfeeding and on the postpartum patient. We also discussed keeping the package insert for quick access to instructions on how to properly make up for missed doses.   Pt is cleared  for all activity    RTC for annual or PRN  Counseling time: 15 minutes    Holley Hernandez

## 2022-06-22 ENCOUNTER — TELEPHONE (OUTPATIENT)
Dept: RESEARCH | Facility: OTHER | Age: 32
End: 2022-06-22
Payer: COMMERCIAL

## 2022-06-22 NOTE — TELEPHONE ENCOUNTER
Spoke with subject regarding her $100 clincard stipend from the [facility] study.  Pts states she received the card and spent the $100 with no problem

## 2022-08-15 PROBLEM — O14.93 PRE-ECLAMPSIA IN THIRD TRIMESTER: Status: RESOLVED | Noted: 2022-05-07 | Resolved: 2022-08-15

## 2022-12-05 ENCOUNTER — PATIENT MESSAGE (OUTPATIENT)
Dept: OBSTETRICS AND GYNECOLOGY | Facility: CLINIC | Age: 32
End: 2022-12-05
Payer: COMMERCIAL

## 2022-12-05 ENCOUNTER — TELEPHONE (OUTPATIENT)
Dept: OBSTETRICS AND GYNECOLOGY | Facility: CLINIC | Age: 32
End: 2022-12-05
Payer: COMMERCIAL

## 2022-12-05 DIAGNOSIS — N91.2 AMENORRHEA: Primary | ICD-10-CM

## 2022-12-22 ENCOUNTER — HOSPITAL ENCOUNTER (OUTPATIENT)
Dept: PERINATAL CARE | Facility: OTHER | Age: 32
Discharge: HOME | End: 2022-12-22
Attending: STUDENT IN AN ORGANIZED HEALTH CARE EDUCATION/TRAINING PROGRAM
Payer: COMMERCIAL

## 2022-12-22 ENCOUNTER — OFFICE VISIT (OUTPATIENT)
Dept: OBSTETRICS AND GYNECOLOGY | Facility: CLINIC | Age: 32
End: 2022-12-22
Payer: COMMERCIAL

## 2022-12-22 VITALS
DIASTOLIC BLOOD PRESSURE: 88 MMHG | WEIGHT: 274.69 LBS | SYSTOLIC BLOOD PRESSURE: 122 MMHG | BODY MASS INDEX: 43.02 KG/M2

## 2022-12-22 DIAGNOSIS — Z32.01 POSITIVE PREGNANCY TEST: ICD-10-CM

## 2022-12-22 DIAGNOSIS — Z86.32 HISTORY OF GESTATIONAL DIABETES: ICD-10-CM

## 2022-12-22 DIAGNOSIS — N91.2 AMENORRHEA: ICD-10-CM

## 2022-12-22 DIAGNOSIS — Z87.59 HISTORY OF PRE-ECLAMPSIA: ICD-10-CM

## 2022-12-22 DIAGNOSIS — N91.4 SECONDARY OLIGOMENORRHEA: Primary | ICD-10-CM

## 2022-12-22 PROBLEM — Z28.39 RUBELLA NON-IMMUNE STATUS, ANTEPARTUM: Status: RESOLVED | Noted: 2021-12-01 | Resolved: 2022-12-22

## 2022-12-22 PROBLEM — R74.8 ELEVATED LIVER ENZYMES: Status: RESOLVED | Noted: 2021-12-01 | Resolved: 2022-12-22

## 2022-12-22 PROBLEM — F32.A DEPRESSION AFFECTING PREGNANCY: Status: RESOLVED | Noted: 2021-12-01 | Resolved: 2022-12-22

## 2022-12-22 PROBLEM — O99.340 DEPRESSION AFFECTING PREGNANCY: Status: RESOLVED | Noted: 2021-12-01 | Resolved: 2022-12-22

## 2022-12-22 PROBLEM — O09.899 RUBELLA NON-IMMUNE STATUS, ANTEPARTUM: Status: RESOLVED | Noted: 2021-12-01 | Resolved: 2022-12-22

## 2022-12-22 LAB
CREAT UR-MCNC: 188 MG/DL (ref 15–325)
PROT UR-MCNC: 9 MG/DL (ref 0–15)
PROT/CREAT UR: 0.05 MG/G{CREAT} (ref 0–0.2)

## 2022-12-22 PROCEDURE — 76801 PR US, OB <14WKS, TRANSABD, SINGLE GESTATION: ICD-10-PCS | Mod: 26,,, | Performed by: OBSTETRICS & GYNECOLOGY

## 2022-12-22 PROCEDURE — 84156 ASSAY OF PROTEIN URINE: CPT | Performed by: NURSE PRACTITIONER

## 2022-12-22 PROCEDURE — 1160F PR REVIEW ALL MEDS BY PRESCRIBER/CLIN PHARMACIST DOCUMENTED: ICD-10-PCS | Mod: CPTII,S$GLB,, | Performed by: NURSE PRACTITIONER

## 2022-12-22 PROCEDURE — 99999 PR PBB SHADOW E&M-EST. PATIENT-LVL III: ICD-10-PCS | Mod: PBBFAC,,, | Performed by: NURSE PRACTITIONER

## 2022-12-22 PROCEDURE — 3044F HG A1C LEVEL LT 7.0%: CPT | Mod: CPTII,S$GLB,, | Performed by: NURSE PRACTITIONER

## 2022-12-22 PROCEDURE — 1159F MED LIST DOCD IN RCRD: CPT | Mod: CPTII,S$GLB,, | Performed by: NURSE PRACTITIONER

## 2022-12-22 PROCEDURE — 3079F PR MOST RECENT DIASTOLIC BLOOD PRESSURE 80-89 MM HG: ICD-10-PCS | Mod: CPTII,S$GLB,, | Performed by: NURSE PRACTITIONER

## 2022-12-22 PROCEDURE — 81025 URINE PREGNANCY TEST: CPT | Mod: S$GLB,,, | Performed by: NURSE PRACTITIONER

## 2022-12-22 PROCEDURE — 1159F PR MEDICATION LIST DOCUMENTED IN MEDICAL RECORD: ICD-10-PCS | Mod: CPTII,S$GLB,, | Performed by: NURSE PRACTITIONER

## 2022-12-22 PROCEDURE — 99213 PR OFFICE/OUTPT VISIT, EST, LEVL III, 20-29 MIN: ICD-10-PCS | Mod: S$GLB,,, | Performed by: NURSE PRACTITIONER

## 2022-12-22 PROCEDURE — 76801 OB US < 14 WKS SINGLE FETUS: CPT | Mod: 26,,, | Performed by: OBSTETRICS & GYNECOLOGY

## 2022-12-22 PROCEDURE — 3079F DIAST BP 80-89 MM HG: CPT | Mod: CPTII,S$GLB,, | Performed by: NURSE PRACTITIONER

## 2022-12-22 PROCEDURE — 3074F PR MOST RECENT SYSTOLIC BLOOD PRESSURE < 130 MM HG: ICD-10-PCS | Mod: CPTII,S$GLB,, | Performed by: NURSE PRACTITIONER

## 2022-12-22 PROCEDURE — 99213 OFFICE O/P EST LOW 20 MIN: CPT | Mod: S$GLB,,, | Performed by: NURSE PRACTITIONER

## 2022-12-22 PROCEDURE — 3008F BODY MASS INDEX DOCD: CPT | Mod: CPTII,S$GLB,, | Performed by: NURSE PRACTITIONER

## 2022-12-22 PROCEDURE — 1160F RVW MEDS BY RX/DR IN RCRD: CPT | Mod: CPTII,S$GLB,, | Performed by: NURSE PRACTITIONER

## 2022-12-22 PROCEDURE — 3044F PR MOST RECENT HEMOGLOBIN A1C LEVEL <7.0%: ICD-10-PCS | Mod: CPTII,S$GLB,, | Performed by: NURSE PRACTITIONER

## 2022-12-22 PROCEDURE — 76801 OB US < 14 WKS SINGLE FETUS: CPT

## 2022-12-22 PROCEDURE — 3008F PR BODY MASS INDEX (BMI) DOCUMENTED: ICD-10-PCS | Mod: CPTII,S$GLB,, | Performed by: NURSE PRACTITIONER

## 2022-12-22 PROCEDURE — 81025 PR  URINE PREGNANCY TEST: ICD-10-PCS | Mod: S$GLB,,, | Performed by: NURSE PRACTITIONER

## 2022-12-22 PROCEDURE — 99999 PR PBB SHADOW E&M-EST. PATIENT-LVL III: CPT | Mod: PBBFAC,,, | Performed by: NURSE PRACTITIONER

## 2022-12-22 PROCEDURE — 3074F SYST BP LT 130 MM HG: CPT | Mod: CPTII,S$GLB,, | Performed by: NURSE PRACTITIONER

## 2022-12-22 NOTE — PROGRESS NOTES
CC: Positive Pregnancy Test    HISTORY OF PRESENT ILLNESS:    Marcie Cheek is a 32 y.o. female, ,  Presents today for a routine exam complaining of amenorrhea and positive home urine pregnancy test.  Patient's last menstrual period was 10/19/2022 (approximate).  New to me- pt of Dr. Hernandez. Second pregnancy- see dating scan results. Prior vaginal delivery at 36 weeks for pre-eclampsia. History of GDM (passed postpartum 2 hour glucose). Only taking a prenatal vitamin. Stopped lexapro. Feels okay just overwhelmed- daughter at home is 7 months old. She is no longer breast feeding. First pregnancy was IUI, this was a spontaneous pregnancy. No bleeding or pain. Reports nausea mainly at night- no vomiting. Here with her . Hoping to find out gender of baby- first was a surprise. Concerned about being high risk again this pregnancy.     Pregnancy   =========   [place] pregnancy. Number of embryos: 1     Dating   ======   LMP on: 10/19/2022   GA by LMP 9 w + 1 d   MANPREET by LMP: 2023   Ultrasound examination on: 2022   GA by U/S based upon: CRL   GA by U/S 8 w + 3 d   MANPREET by U/S: 2023   Assigned: based on ultrasound (CRL), selected on 2022   Assigned GA 8 w + 3 d   Assigned MANPREET: 2023     Assessment   ==========   Gestational sac: visualized   Location: intrauterine   Yolk sac: visualized   Amniotic sac: visualized   Embryo: visualized   CRL 18.9 mm 8w 3d [place]   Cardiac activity: present    bpm       ROS:  GENERAL: No weight changes. No swelling. No fatigue. No fever. + nausea  CARDIOVASCULAR: No chest pain. No shortness of breath. No leg cramps.   NEUROLOGICAL: No headaches. No vision changes.  BREASTS: No pain. No lumps. No discharge.  ABDOMEN: No pain. No diarrhea. No constipation.  REPRODUCTIVE: No abnormal bleeding.   VULVA: No pain. No lesions. No itching.  VAGINA: No relaxation. No itching. No odor. No discharge. No lesions.  URINARY: No incontinence. No  nocturia. No frequency. No dysuria.    MEDICATIONS AND ALLERGIES:  Reviewed    COMPREHENSIVE GYN HISTORY:  PAP History: Denies abnormal Paps.  Infection History: Denies STDs. Denies PID.  Benign History: Denies uterine fibroids. Denies ovarian cysts. Denies endometriosis. Denies other conditions.  Cancer History: Denies cervical cancer. Denies uterine cancer or hyperplasia. Denies ovarian cancer. Denies vulvar cancer or pre-cancer. Denies vaginal cancer or pre-cancer. Denies breast cancer. Denies colon cancer.  Sexual Activity History: Reports currently being sexually active  Menstrual History: None.  Contraception: None    /88   Wt 124.6 kg (274 lb 11.1 oz)   LMP 10/19/2022 (Approximate)   BMI 43.02 kg/m²     PE:  AFFECT: Calm, alert and oriented X 3. Interactive during exam  GENERAL: Appears well-nourished, well-developed, in no acute distress.  HEAD: Normocephalic, atruamatic  TEETH: Good dentition.  THYROID: No thyromegally   BREASTS: No masses, skin changes, nipple discharge or adenopathy bilaterally.  SKIN: Normal for race, warm, & dry. No lesions or rashes.    PROCEDURES:  UPT Positive  Genprobe    ASSESSMENT/PLAN:  Amenorrhea  Positive urine pregnancy test   -  Routine prenatal care    Nausea and vomiting in pregnancy    -  Education regarding lifestyle and dietary modifications    -  Advised use of B6/Unisom. Pt will notify us if no relief/worsening symptoms, will consider Zofran if needed.    1st TRIMESTER COUNSELING: Discussed all, booklet provided:  Common complaints of pregnancy  HIV and other routine prenatal tests including  genetic screening  Risk factors identified by prenatal history  Oriented to practice - discussed anticipated course of prenatal care & indications for Ultrasound  Childbirth classes/Hospital facilities   Nutrition and weight gain counseling  Toxoplasmosis precautions (Cats/Raw Meat)  Sexual activity and exercise  Environmental/Work hazards  Travel  Tobacco (Ask,  Advise, Assess, Assist, and Arrange), as well as alcohol and drug use  Use of any medications (Including supplements, Vitamins, Herbs, or OTC Drugs)  Domestic violence  Seat belt use    TERATOLOGY COUNSELING:   Discussed indications and options for aneuploidy screening - pamphlets given     FOLLOW-UP in 4 weeks with Dr. Hernandez  Labs today, along with baseline preE labs  Pap current  GC and urine cx pending  SS ordered  Start baby asa at 12 weeks, will do connected mom    Tammi Galvan, NP  OB/GYN

## 2022-12-22 NOTE — PATIENT INSTRUCTIONS
LABOR AND DELIVERY PHONE NUMBER, [phone] (OPEN 24/7, LOCATED ON 6TH FLOOR OF HOSPITAL)  [address] PHONE NUMBER, [phone] (OPEN MON-FRI, 8a-5p)    1) Eat small frequent meals through the day versus three large meals  2) Try ginger ale or sprite to help settle the stomach   3) Eat crackers or dry toast before getting out of bed in the morning   4) Stay hydrated by drinking plenty of water-do not immediately eat or drink something after vomiting. Give your stomach a rest for 20-30 minutes. Slowly reintroduce ice chips, small sips water, crackers, etc.    5) Try OTC Unisom (use the tablets that have doxylamine not diphenhydramine in them, can use generic brands like Equate) and vitamin B6  (25 mg, can find on [employer]) together at night before bed. This can help with the nausea in the morning and is safe to use during pregnancy. You can also take the vitamin B6 alone, every 8 hours to help with the nausea.     If you are unable to keep anything down and constantly vomiting for more that 24 hours, let the office know so that dehydration can be avoided. We would recommend being seen in the emergency department if this is the case.     The sequential screen is a test done around 12-14 weeks that consists of an ultrasound that measures the nuchal fold (neck thickness) of baby and blood work on the same day. You get a second set of labs done a few weeks later after 15 weeks. Based on all three of these results, they are able to tell you if you are considered to be low risk or high risk regarding neural tube defects and chromosomal abnormalities like Down Syndrome. If you are at all interested, I usually place the order today so we do not miss the window. Someone will give you a phone call in a week or two to schedule this. If you do not want it, just tell them no thank you. This test is typically covered by your insurance and is performed by the Maternal Fetal Medicine (MFM) department here at [facility]. It will not  tell you the sex of the baby.    Call [phone] to see about coverage and any out of pocket costs regarding the [id number] (MT21) testing. This is done any day after 11 weeks and is blood work only. It checks for any chromosomal abnormalities like Down Syndrome. You can also find out the sex of the baby if you choose to know. Once you find out coverage and decide to proceed, send either myself or your doctor a message and we can see what date you can do it. It is done at our second floor lab at [facility].

## 2023-01-14 ENCOUNTER — PATIENT MESSAGE (OUTPATIENT)
Dept: OBSTETRICS AND GYNECOLOGY | Facility: CLINIC | Age: 33
End: 2023-01-14
Payer: COMMERCIAL

## 2023-01-17 ENCOUNTER — TELEPHONE (OUTPATIENT)
Dept: OBSTETRICS AND GYNECOLOGY | Facility: CLINIC | Age: 33
End: 2023-01-17
Payer: COMMERCIAL

## 2023-01-17 ENCOUNTER — PATIENT MESSAGE (OUTPATIENT)
Dept: OBSTETRICS AND GYNECOLOGY | Facility: CLINIC | Age: 33
End: 2023-01-17
Payer: COMMERCIAL

## 2023-01-17 DIAGNOSIS — Z87.59 HISTORY OF PRE-ECLAMPSIA: Primary | ICD-10-CM

## 2023-01-17 DIAGNOSIS — Z86.32 HISTORY OF GESTATIONAL DIABETES: ICD-10-CM

## 2023-02-01 ENCOUNTER — PATIENT MESSAGE (OUTPATIENT)
Dept: ADMINISTRATIVE | Facility: OTHER | Age: 33
End: 2023-02-01
Payer: COMMERCIAL

## 2023-02-01 ENCOUNTER — INITIAL PRENATAL (OUTPATIENT)
Dept: OBSTETRICS AND GYNECOLOGY | Facility: CLINIC | Age: 33
End: 2023-02-01
Payer: COMMERCIAL

## 2023-02-01 VITALS
BODY MASS INDEX: 45.34 KG/M2 | WEIGHT: 289.44 LBS | SYSTOLIC BLOOD PRESSURE: 125 MMHG | DIASTOLIC BLOOD PRESSURE: 77 MMHG

## 2023-02-01 DIAGNOSIS — Z3A.14 14 WEEKS GESTATION OF PREGNANCY: Primary | ICD-10-CM

## 2023-02-01 PROCEDURE — 0500F INITIAL PRENATAL CARE VISIT: CPT | Mod: CPTII,S$GLB,, | Performed by: NURSE PRACTITIONER

## 2023-02-01 PROCEDURE — 87591 N.GONORRHOEAE DNA AMP PROB: CPT | Performed by: NURSE PRACTITIONER

## 2023-02-01 PROCEDURE — 87086 URINE CULTURE/COLONY COUNT: CPT | Performed by: NURSE PRACTITIONER

## 2023-02-01 PROCEDURE — 99999 PR PBB SHADOW E&M-EST. PATIENT-LVL III: CPT | Mod: PBBFAC,,, | Performed by: NURSE PRACTITIONER

## 2023-02-01 PROCEDURE — 0500F PR INITIAL PRENATAL CARE VISIT: ICD-10-PCS | Mod: CPTII,S$GLB,, | Performed by: NURSE PRACTITIONER

## 2023-02-01 PROCEDURE — 99999 PR PBB SHADOW E&M-EST. PATIENT-LVL III: ICD-10-PCS | Mod: PBBFAC,,, | Performed by: NURSE PRACTITIONER

## 2023-02-01 NOTE — PATIENT INSTRUCTIONS
LABOR AND DELIVERY PHONE NUMBER, [phone] (OPEN 24/7, LOCATED ON 6TH FLOOR OF HOSPITAL)  [address] PHONE NUMBER, [phone] (OPEN MON-FRI, 8a-5p)

## 2023-02-01 NOTE — PROGRESS NOTES
Here for routine OB appt at 14w2d, with no complaints. Started feeling some flutters, plans to find out sex with ultrasound. Has 8 mo at home.   Denies VB and cramping.  Pain, bleeding, and PTL precautions given.  F/U scheduled 4 weeks   Signed up for connected mom  Start baby asa  Anatomy scan ordered  Declines aneuploidy screening

## 2023-02-03 LAB
BACTERIA UR CULT: NORMAL
BACTERIA UR CULT: NORMAL
C TRACH DNA SPEC QL NAA+PROBE: NOT DETECTED
N GONORRHOEA DNA SPEC QL NAA+PROBE: NOT DETECTED

## 2023-02-13 ENCOUNTER — PATIENT MESSAGE (OUTPATIENT)
Dept: OTHER | Facility: OTHER | Age: 33
End: 2023-02-13
Payer: COMMERCIAL

## 2023-02-20 ENCOUNTER — PATIENT MESSAGE (OUTPATIENT)
Dept: OTHER | Facility: OTHER | Age: 33
End: 2023-02-20
Payer: COMMERCIAL

## 2023-03-07 ENCOUNTER — PATIENT MESSAGE (OUTPATIENT)
Dept: MATERNAL FETAL MEDICINE | Facility: CLINIC | Age: 33
End: 2023-03-07
Payer: COMMERCIAL

## 2023-03-08 ENCOUNTER — PROCEDURE VISIT (OUTPATIENT)
Dept: MATERNAL FETAL MEDICINE | Facility: CLINIC | Age: 33
End: 2023-03-08
Payer: COMMERCIAL

## 2023-03-08 DIAGNOSIS — Z3A.14 14 WEEKS GESTATION OF PREGNANCY: ICD-10-CM

## 2023-03-08 DIAGNOSIS — Z36.89 ENCOUNTER FOR ULTRASOUND TO ASSESS FETAL GROWTH: Primary | ICD-10-CM

## 2023-03-08 PROCEDURE — 76811 OB US DETAILED SNGL FETUS: CPT | Mod: S$GLB,,, | Performed by: OBSTETRICS & GYNECOLOGY

## 2023-03-08 PROCEDURE — 76811 US MFM PROCEDURE (VIEWPOINT): ICD-10-PCS | Mod: S$GLB,,, | Performed by: OBSTETRICS & GYNECOLOGY

## 2023-03-13 ENCOUNTER — PATIENT MESSAGE (OUTPATIENT)
Dept: OTHER | Facility: OTHER | Age: 33
End: 2023-03-13
Payer: COMMERCIAL

## 2023-04-10 ENCOUNTER — PATIENT MESSAGE (OUTPATIENT)
Dept: OTHER | Facility: OTHER | Age: 33
End: 2023-04-10
Payer: COMMERCIAL

## 2023-04-12 ENCOUNTER — LAB VISIT (OUTPATIENT)
Dept: LAB | Facility: OTHER | Age: 33
End: 2023-04-12
Attending: STUDENT IN AN ORGANIZED HEALTH CARE EDUCATION/TRAINING PROGRAM
Payer: COMMERCIAL

## 2023-04-12 ENCOUNTER — ROUTINE PRENATAL (OUTPATIENT)
Dept: OBSTETRICS AND GYNECOLOGY | Facility: CLINIC | Age: 33
End: 2023-04-12
Payer: COMMERCIAL

## 2023-04-12 VITALS — SYSTOLIC BLOOD PRESSURE: 130 MMHG | BODY MASS INDEX: 45.99 KG/M2 | DIASTOLIC BLOOD PRESSURE: 88 MMHG | WEIGHT: 293 LBS

## 2023-04-12 DIAGNOSIS — Z86.32 HISTORY OF GESTATIONAL DIABETES: ICD-10-CM

## 2023-04-12 DIAGNOSIS — Z87.59 HISTORY OF PRE-ECLAMPSIA: ICD-10-CM

## 2023-04-12 DIAGNOSIS — O99.212 OBESITY AFFECTING PREGNANCY IN SECOND TRIMESTER: Primary | ICD-10-CM

## 2023-04-12 LAB
BASOPHILS # BLD AUTO: 0.02 K/UL (ref 0–0.2)
BASOPHILS NFR BLD: 0.2 % (ref 0–1.9)
DIFFERENTIAL METHOD: ABNORMAL
EOSINOPHIL # BLD AUTO: 0.1 K/UL (ref 0–0.5)
EOSINOPHIL NFR BLD: 0.4 % (ref 0–8)
ERYTHROCYTE [DISTWIDTH] IN BLOOD BY AUTOMATED COUNT: 15.4 % (ref 11.5–14.5)
GLUCOSE SERPL-MCNC: 185 MG/DL (ref 70–140)
HCT VFR BLD AUTO: 36.1 % (ref 37–48.5)
HGB BLD-MCNC: 12 G/DL (ref 12–16)
IMM GRANULOCYTES # BLD AUTO: 0.04 K/UL (ref 0–0.04)
IMM GRANULOCYTES NFR BLD AUTO: 0.3 % (ref 0–0.5)
LYMPHOCYTES # BLD AUTO: 2.8 K/UL (ref 1–4.8)
LYMPHOCYTES NFR BLD: 23.1 % (ref 18–48)
MCH RBC QN AUTO: 29.9 PG (ref 27–31)
MCHC RBC AUTO-ENTMCNC: 33.2 G/DL (ref 32–36)
MCV RBC AUTO: 90 FL (ref 82–98)
MONOCYTES # BLD AUTO: 0.7 K/UL (ref 0.3–1)
MONOCYTES NFR BLD: 6 % (ref 4–15)
NEUTROPHILS # BLD AUTO: 8.3 K/UL (ref 1.8–7.7)
NEUTROPHILS NFR BLD: 70 % (ref 38–73)
NRBC BLD-RTO: 0 /100 WBC
PLATELET # BLD AUTO: 240 K/UL (ref 150–450)
PMV BLD AUTO: 11.2 FL (ref 9.2–12.9)
RBC # BLD AUTO: 4.02 M/UL (ref 4–5.4)
WBC # BLD AUTO: 11.89 K/UL (ref 3.9–12.7)

## 2023-04-12 PROCEDURE — 99999 PR PBB SHADOW E&M-EST. PATIENT-LVL III: ICD-10-PCS | Mod: PBBFAC,,, | Performed by: STUDENT IN AN ORGANIZED HEALTH CARE EDUCATION/TRAINING PROGRAM

## 2023-04-12 PROCEDURE — 36415 COLL VENOUS BLD VENIPUNCTURE: CPT | Performed by: STUDENT IN AN ORGANIZED HEALTH CARE EDUCATION/TRAINING PROGRAM

## 2023-04-12 PROCEDURE — 0502F PR SUBSEQUENT PRENATAL CARE: ICD-10-PCS | Mod: CPTII,S$GLB,, | Performed by: STUDENT IN AN ORGANIZED HEALTH CARE EDUCATION/TRAINING PROGRAM

## 2023-04-12 PROCEDURE — 85025 COMPLETE CBC W/AUTO DIFF WBC: CPT | Performed by: STUDENT IN AN ORGANIZED HEALTH CARE EDUCATION/TRAINING PROGRAM

## 2023-04-12 PROCEDURE — 82950 GLUCOSE TEST: CPT | Performed by: STUDENT IN AN ORGANIZED HEALTH CARE EDUCATION/TRAINING PROGRAM

## 2023-04-12 PROCEDURE — 99999 PR PBB SHADOW E&M-EST. PATIENT-LVL III: CPT | Mod: PBBFAC,,, | Performed by: STUDENT IN AN ORGANIZED HEALTH CARE EDUCATION/TRAINING PROGRAM

## 2023-04-12 PROCEDURE — 0502F SUBSEQUENT PRENATAL CARE: CPT | Mod: CPTII,S$GLB,, | Performed by: STUDENT IN AN ORGANIZED HEALTH CARE EDUCATION/TRAINING PROGRAM

## 2023-04-12 NOTE — PATIENT INSTRUCTIONS
JENNI, Labor and Delivery is on the 6th floor of [facility], [place]: [phone]    [email or web address]      Tdap or Dtap for close family if not had in the past five years

## 2023-04-12 NOTE — PROGRESS NOTES
Pt doing well. Definitely having more pelvic pressure sooner than last time. Mood is good  Denies vaginal bleeding, LOF, contractions. Reports regular fetal movement. Denies symptoms of pre E.  VS reviewed.  glucola underway  Reviewed tdap recs   Labor and pre E precautions reviewed.   RTC: 4 weeks

## 2023-04-18 ENCOUNTER — PATIENT MESSAGE (OUTPATIENT)
Dept: MATERNAL FETAL MEDICINE | Facility: CLINIC | Age: 33
End: 2023-04-18
Payer: COMMERCIAL

## 2023-04-19 ENCOUNTER — OFFICE VISIT (OUTPATIENT)
Dept: ANESTHESIOLOGY | Facility: OTHER | Age: 33
End: 2023-04-19
Attending: STUDENT IN AN ORGANIZED HEALTH CARE EDUCATION/TRAINING PROGRAM
Payer: COMMERCIAL

## 2023-04-19 ENCOUNTER — PROCEDURE VISIT (OUTPATIENT)
Dept: MATERNAL FETAL MEDICINE | Facility: CLINIC | Age: 33
End: 2023-04-19
Payer: COMMERCIAL

## 2023-04-19 DIAGNOSIS — Z36.89 ENCOUNTER FOR ULTRASOUND TO ASSESS FETAL GROWTH: ICD-10-CM

## 2023-04-19 DIAGNOSIS — O99.212 OBESITY AFFECTING PREGNANCY IN SECOND TRIMESTER: ICD-10-CM

## 2023-04-19 PROCEDURE — 76816 US MFM PROCEDURE (VIEWPOINT): ICD-10-PCS | Mod: S$GLB,,, | Performed by: OBSTETRICS & GYNECOLOGY

## 2023-04-19 PROCEDURE — 76816 OB US FOLLOW-UP PER FETUS: CPT | Mod: S$GLB,,, | Performed by: OBSTETRICS & GYNECOLOGY

## 2023-04-24 ENCOUNTER — PATIENT MESSAGE (OUTPATIENT)
Dept: OTHER | Facility: OTHER | Age: 33
End: 2023-04-24
Payer: COMMERCIAL

## 2023-05-08 ENCOUNTER — PATIENT MESSAGE (OUTPATIENT)
Dept: OTHER | Facility: OTHER | Age: 33
End: 2023-05-08
Payer: COMMERCIAL

## 2023-05-09 ENCOUNTER — PATIENT MESSAGE (OUTPATIENT)
Dept: OBSTETRICS AND GYNECOLOGY | Facility: CLINIC | Age: 33
End: 2023-05-09
Payer: COMMERCIAL

## 2023-05-09 DIAGNOSIS — Z86.32 HISTORY OF GESTATIONAL DIABETES: Primary | ICD-10-CM

## 2023-05-10 ENCOUNTER — ROUTINE PRENATAL (OUTPATIENT)
Dept: OBSTETRICS AND GYNECOLOGY | Facility: CLINIC | Age: 33
End: 2023-05-10
Payer: COMMERCIAL

## 2023-05-10 ENCOUNTER — PATIENT MESSAGE (OUTPATIENT)
Dept: MATERNAL FETAL MEDICINE | Facility: CLINIC | Age: 33
End: 2023-05-10
Payer: COMMERCIAL

## 2023-05-10 ENCOUNTER — CLINICAL SUPPORT (OUTPATIENT)
Dept: OBSTETRICS AND GYNECOLOGY | Facility: CLINIC | Age: 33
End: 2023-05-10
Payer: COMMERCIAL

## 2023-05-10 ENCOUNTER — LAB VISIT (OUTPATIENT)
Dept: LAB | Facility: OTHER | Age: 33
End: 2023-05-10
Attending: STUDENT IN AN ORGANIZED HEALTH CARE EDUCATION/TRAINING PROGRAM
Payer: COMMERCIAL

## 2023-05-10 VITALS — BODY MASS INDEX: 47.41 KG/M2 | WEIGHT: 293 LBS | DIASTOLIC BLOOD PRESSURE: 86 MMHG | SYSTOLIC BLOOD PRESSURE: 142 MMHG

## 2023-05-10 DIAGNOSIS — Z23 NEED FOR TDAP VACCINATION: Primary | ICD-10-CM

## 2023-05-10 DIAGNOSIS — O24.419 GESTATIONAL DIABETES MELLITUS (GDM) AFFECTING PREGNANCY: Primary | ICD-10-CM

## 2023-05-10 DIAGNOSIS — Z86.32 HISTORY OF GESTATIONAL DIABETES: ICD-10-CM

## 2023-05-10 LAB
GLUCOSE SERPL-MCNC: 118 MG/DL (ref 70–110)
GLUCOSE SERPL-MCNC: 130 MG/DL
GLUCOSE SERPL-MCNC: 157 MG/DL
GLUCOSE SERPL-MCNC: 207 MG/DL

## 2023-05-10 PROCEDURE — 90471 TDAP VACCINE GREATER THAN OR EQUAL TO 7YO IM: ICD-10-PCS | Mod: S$GLB,,, | Performed by: STUDENT IN AN ORGANIZED HEALTH CARE EDUCATION/TRAINING PROGRAM

## 2023-05-10 PROCEDURE — 82951 GLUCOSE TOLERANCE TEST (GTT): CPT | Performed by: STUDENT IN AN ORGANIZED HEALTH CARE EDUCATION/TRAINING PROGRAM

## 2023-05-10 PROCEDURE — 0502F PR SUBSEQUENT PRENATAL CARE: ICD-10-PCS | Mod: CPTII,S$GLB,, | Performed by: STUDENT IN AN ORGANIZED HEALTH CARE EDUCATION/TRAINING PROGRAM

## 2023-05-10 PROCEDURE — 99999 PR PBB SHADOW E&M-EST. PATIENT-LVL III: ICD-10-PCS | Mod: PBBFAC,,, | Performed by: STUDENT IN AN ORGANIZED HEALTH CARE EDUCATION/TRAINING PROGRAM

## 2023-05-10 PROCEDURE — 0502F SUBSEQUENT PRENATAL CARE: CPT | Mod: CPTII,S$GLB,, | Performed by: STUDENT IN AN ORGANIZED HEALTH CARE EDUCATION/TRAINING PROGRAM

## 2023-05-10 PROCEDURE — 90471 IMMUNIZATION ADMIN: CPT | Mod: S$GLB,,, | Performed by: STUDENT IN AN ORGANIZED HEALTH CARE EDUCATION/TRAINING PROGRAM

## 2023-05-10 PROCEDURE — 99999 PR PBB SHADOW E&M-EST. PATIENT-LVL I: ICD-10-PCS | Mod: PBBFAC,,,

## 2023-05-10 PROCEDURE — 90715 TDAP VACCINE 7 YRS/> IM: CPT | Mod: S$GLB,,, | Performed by: STUDENT IN AN ORGANIZED HEALTH CARE EDUCATION/TRAINING PROGRAM

## 2023-05-10 PROCEDURE — 99999 PR PBB SHADOW E&M-EST. PATIENT-LVL I: CPT | Mod: PBBFAC,,,

## 2023-05-10 PROCEDURE — 36415 COLL VENOUS BLD VENIPUNCTURE: CPT | Performed by: STUDENT IN AN ORGANIZED HEALTH CARE EDUCATION/TRAINING PROGRAM

## 2023-05-10 PROCEDURE — 90715 TDAP VACCINE GREATER THAN OR EQUAL TO 7YO IM: ICD-10-PCS | Mod: S$GLB,,, | Performed by: STUDENT IN AN ORGANIZED HEALTH CARE EDUCATION/TRAINING PROGRAM

## 2023-05-10 PROCEDURE — 99999 PR PBB SHADOW E&M-EST. PATIENT-LVL III: CPT | Mod: PBBFAC,,, | Performed by: STUDENT IN AN ORGANIZED HEALTH CARE EDUCATION/TRAINING PROGRAM

## 2023-05-10 NOTE — PROGRESS NOTES
Pt doing well. Failed 3hour test today. GDM diagnosed. MFM referral placed. Has glucometer from last pregnancy along with strips and lancets. She admits that diet hasn't been the best, so wants to try diet before going to insulin. Metformin last pregnancy did not work  Denies vaginal bleeding. Reports regular fetal movement. Denies symptoms of pre E.  Tdap today  VS reviewed.  Labor and pre E precautions reviewed.   RTC: 4 weeks

## 2023-05-10 NOTE — PATIENT INSTRUCTIONS
JENNI, Labor and Delivery is on the 6th floor of [facility]: [phone]    [email or web address]      Goals:  Fasting <95  2 hours after meal<120

## 2023-05-22 ENCOUNTER — PATIENT MESSAGE (OUTPATIENT)
Dept: OTHER | Facility: OTHER | Age: 33
End: 2023-05-22
Payer: COMMERCIAL

## 2023-05-23 ENCOUNTER — PATIENT MESSAGE (OUTPATIENT)
Dept: OBSTETRICS AND GYNECOLOGY | Facility: CLINIC | Age: 33
End: 2023-05-23
Payer: COMMERCIAL

## 2023-06-05 ENCOUNTER — PATIENT MESSAGE (OUTPATIENT)
Dept: OTHER | Facility: OTHER | Age: 33
End: 2023-06-05
Payer: COMMERCIAL

## 2023-06-07 ENCOUNTER — OFFICE VISIT (OUTPATIENT)
Dept: MATERNAL FETAL MEDICINE | Facility: CLINIC | Age: 33
End: 2023-06-07
Payer: COMMERCIAL

## 2023-06-07 ENCOUNTER — ROUTINE PRENATAL (OUTPATIENT)
Dept: OBSTETRICS AND GYNECOLOGY | Facility: CLINIC | Age: 33
End: 2023-06-07
Payer: COMMERCIAL

## 2023-06-07 ENCOUNTER — PROCEDURE VISIT (OUTPATIENT)
Dept: MATERNAL FETAL MEDICINE | Facility: CLINIC | Age: 33
End: 2023-06-07
Payer: COMMERCIAL

## 2023-06-07 ENCOUNTER — PATIENT MESSAGE (OUTPATIENT)
Dept: MATERNAL FETAL MEDICINE | Facility: CLINIC | Age: 33
End: 2023-06-07

## 2023-06-07 VITALS
DIASTOLIC BLOOD PRESSURE: 86 MMHG | BODY MASS INDEX: 45.95 KG/M2 | HEIGHT: 67 IN | SYSTOLIC BLOOD PRESSURE: 138 MMHG | WEIGHT: 292.75 LBS

## 2023-06-07 VITALS — SYSTOLIC BLOOD PRESSURE: 126 MMHG | WEIGHT: 293 LBS | BODY MASS INDEX: 45.99 KG/M2 | DIASTOLIC BLOOD PRESSURE: 86 MMHG

## 2023-06-07 DIAGNOSIS — O24.419 GESTATIONAL DIABETES MELLITUS (GDM) AFFECTING PREGNANCY: Primary | ICD-10-CM

## 2023-06-07 DIAGNOSIS — O10.919 CHRONIC HYPERTENSION AFFECTING PREGNANCY: ICD-10-CM

## 2023-06-07 DIAGNOSIS — Z36.89 ENCOUNTER FOR ULTRASOUND TO ASSESS FETAL GROWTH: ICD-10-CM

## 2023-06-07 DIAGNOSIS — Z3A.32 32 WEEKS GESTATION OF PREGNANCY: Primary | ICD-10-CM

## 2023-06-07 PROCEDURE — 3079F PR MOST RECENT DIASTOLIC BLOOD PRESSURE 80-89 MM HG: ICD-10-PCS | Mod: CPTII,S$GLB,, | Performed by: OBSTETRICS & GYNECOLOGY

## 2023-06-07 PROCEDURE — 99999 PR PBB SHADOW E&M-EST. PATIENT-LVL III: ICD-10-PCS | Mod: PBBFAC,,, | Performed by: OBSTETRICS & GYNECOLOGY

## 2023-06-07 PROCEDURE — 3008F PR BODY MASS INDEX (BMI) DOCUMENTED: ICD-10-PCS | Mod: CPTII,S$GLB,, | Performed by: OBSTETRICS & GYNECOLOGY

## 2023-06-07 PROCEDURE — 3008F BODY MASS INDEX DOCD: CPT | Mod: CPTII,S$GLB,, | Performed by: OBSTETRICS & GYNECOLOGY

## 2023-06-07 PROCEDURE — 76816 OB US FOLLOW-UP PER FETUS: CPT | Mod: S$GLB,,, | Performed by: OBSTETRICS & GYNECOLOGY

## 2023-06-07 PROCEDURE — 99999 PR PBB SHADOW E&M-EST. PATIENT-LVL III: ICD-10-PCS | Mod: PBBFAC,,, | Performed by: NURSE PRACTITIONER

## 2023-06-07 PROCEDURE — 99214 PR OFFICE/OUTPT VISIT, EST, LEVL IV, 30-39 MIN: ICD-10-PCS | Mod: 25,S$GLB,, | Performed by: OBSTETRICS & GYNECOLOGY

## 2023-06-07 PROCEDURE — 0502F PR SUBSEQUENT PRENATAL CARE: ICD-10-PCS | Mod: CPTII,S$GLB,, | Performed by: NURSE PRACTITIONER

## 2023-06-07 PROCEDURE — 99999 PR PBB SHADOW E&M-EST. PATIENT-LVL III: CPT | Mod: PBBFAC,,, | Performed by: OBSTETRICS & GYNECOLOGY

## 2023-06-07 PROCEDURE — 3079F DIAST BP 80-89 MM HG: CPT | Mod: CPTII,S$GLB,, | Performed by: OBSTETRICS & GYNECOLOGY

## 2023-06-07 PROCEDURE — 0502F SUBSEQUENT PRENATAL CARE: CPT | Mod: CPTII,S$GLB,, | Performed by: NURSE PRACTITIONER

## 2023-06-07 PROCEDURE — 3075F SYST BP GE 130 - 139MM HG: CPT | Mod: CPTII,S$GLB,, | Performed by: OBSTETRICS & GYNECOLOGY

## 2023-06-07 PROCEDURE — 3075F PR MOST RECENT SYSTOLIC BLOOD PRESS GE 130-139MM HG: ICD-10-PCS | Mod: CPTII,S$GLB,, | Performed by: OBSTETRICS & GYNECOLOGY

## 2023-06-07 PROCEDURE — 76816 US MFM PROCEDURE (VIEWPOINT): ICD-10-PCS | Mod: S$GLB,,, | Performed by: OBSTETRICS & GYNECOLOGY

## 2023-06-07 PROCEDURE — 99214 OFFICE O/P EST MOD 30 MIN: CPT | Mod: 25,S$GLB,, | Performed by: OBSTETRICS & GYNECOLOGY

## 2023-06-07 PROCEDURE — 99999 PR PBB SHADOW E&M-EST. PATIENT-LVL III: CPT | Mod: PBBFAC,,, | Performed by: NURSE PRACTITIONER

## 2023-06-07 RX ORDER — PEN NEEDLE, DIABETIC 30 GX3/16"
1 NEEDLE, DISPOSABLE MISCELLANEOUS NIGHTLY
Qty: 90 EACH | Refills: 0 | Status: ON HOLD | OUTPATIENT
Start: 2023-06-07 | End: 2023-06-29 | Stop reason: HOSPADM

## 2023-06-07 RX ORDER — INSULIN GLARGINE 100 [IU]/ML
10 INJECTION, SOLUTION SUBCUTANEOUS NIGHTLY
Qty: 3 ML | Refills: 1 | Status: ON HOLD | OUTPATIENT
Start: 2023-06-07 | End: 2023-06-29

## 2023-06-07 NOTE — PROGRESS NOTES
"MATERNAL-FETAL MEDICINE   CONSULT NOTE    Provider requesting consultation: Dr. Hernandez    SUBJECTIVE:     Ms. Marcie Cheek is a 33 y.o.  female with IUP at 32w2d who is seen in consultation by MFM for evaluation and management of:  Problem   Gestational Diabetes Mellitus (Gdm) Affecting Pregnancy   Chronic Hypertension Affecting Pregnancy            Medication List with Changes/Refills   Current Medications    PNV NO.1/IRON,CARB/DOCUS/FOLIC (PREN VIT COMB.1-IRON CB-FA-DSS ORAL)    Take by mouth.       Review of patient's allergies indicates:  No Known Allergies    PMH:  Past Medical History:   Diagnosis Date    Anxiety     Depression     Diabetes mellitus affecting pregnancy in third trimester 3/14/2022    Hypertension        PObHx:  OB History    Para Term  AB Living   2 1   1   1   SAB IAB Ectopic Multiple Live Births         0 1      # Outcome Date GA Lbr [place]/2nd Weight Sex Delivery Anes PTL Lv   2 Current            1  22 36w4d 31:20 / 01:16 2.79 kg (6 lb 2.4 oz) F Vag-Spont EPI Y TOM       PSH:  Past Surgical History:   Procedure Laterality Date    HYSTEROSCOPY      WISDOM TOOTH EXTRACTION         Family history:family history includes Thyroid disease in her maternal grandmother.    Social history: reports that she has never smoked. She has never used smokeless tobacco. She reports that she does not currently use alcohol. She reports that she does not use drugs.    Genetic history:  The patient denies any inherited genetic diseases or birth defects in herself or her partner's personal history or family.    Objective:   /86 (BP Location: Left arm, Patient Position: Sitting, BP Method: Large (Manual))   Ht 5' 7" (1.702 m)   Wt 132.8 kg (292 lb 12.3 oz)   LMP 10/19/2022 (Approximate)   BMI 45.85 kg/m²     Ultrasound performed. See viewpoint for full ultrasound report.  1. An LGA growth profile is identified on today's study. While the EFW plots at the 62% (2511 " g), the AC plots at the >99%.  2. Limited repeat anatomy assessment is normal, though several cardiac views remain suboptimal. No findings are concerning for an anomaly that would modify timing, mode or location of delivery. Given advancing gestational age, further scans are unlikely to complete the survey. Fetal US is insufficient to detect all anomalies.    3. AFV is normal.    Significant labs/imaging:  OB Glucose Screen   Date/Time Value Ref Range Status   2023 02:02  (H) 70 - 140 mg/dL Final     Gluc 1 HR   Date/Time Value Ref Range Status   05/10/2023 08:05  mg/dL Final     Gluc 2 HR   Date/Time Value Ref Range Status   05/10/2023 08:05  mg/dL Final     Gluc 3 HR   Date/Time Value Ref Range Status   05/10/2023 08:05  mg/dL Final     Lab Results   Component Value Date     2023    AST 23 2022    ALT 34 2022    CREATININE 0.8 2022       ASSESSMENT/PLAN:     33 y.o.  female with IUP at 32w2d     Gestational diabetes mellitus (GDM) affecting pregnancy  Prior questionable DM dx vs pre-diabetes (A1c 5.8% on metformin prior to P1), managed with insulin  Negative 2hGTT postpartum  Positive [employer] screen this pregnancy    Current regimen:  - Diet control    Blood sugar review(7 days):  No log today, but pt reports regular blood sugar assessment  Fastings have never been less than 100  Postprandials have ranged from 95 to 160    Gestational diabetes increases risk for LGA, macrosomia,  hypoglycemia,  jaundice and birth trauma, including shoulder dystocia. Mothers with gestational diabetes have increased risk of preeclampsia and  delivery.  We also discussed that she would be at an increased risk to develop diabetes later in life, and for this reason we recommend that she inform her primary care provider of her gestational diabetes for regular glucose screening.? We also recommend that the patient have a 2 hour glucose  tolerance test on postpartum day 2 vs at her 6 week postpartum visit.     Recommendations:  1. New regimen:  - Lantus 10 u PM  2. Fasting and 2 hour postprandial blood sugars three times daily  3. Initiate weekly PNT, return in 1 week for BPP and MFM blood sugar check  4. Continue serial monthly growth assessment, next due at 4 weeks, follow with MFM at that time  5. Delivery timing per CHTN guidelines  6. Recommend postpartum screening for underlying glucose intolerance. This can be done at either 6 weeks postpartum -or- on postpartum day 2 while still inpatient.  7. Diabetes screening with primary care provider every 1-3 years    Chronic hypertension affecting pregnancy  PreE with severe features in G1 requiring 36w delivery  Not taking ASA  Pre-pregnancy regimen:  - No meds  Current regimen:  - No meds  Baseline preE labs:  - Cr 0.8  - AST/ALT 23/34    Chronic hypertension is an increasingly common diagnosis affecting pregnancy. The disease is a risk factor for a number of adverse pregnancy outcomes, both maternal and fetal. Women with preexisting hypertension develop preeclampsia in a quarter of their pregnancies, compared to just 4% of women without preexisting hypertensive disease. CHTN is a risk for intrauterine growth restriction, abruption and stillbirth.    Recommendations:  1. Monthly fetal growth US and prenatal testing as per GDM guidelines  2. Continue current regimen   - Targets for therapy should be 140/90, though at this EGA I recommend against outpatient blood pressure medication initiation/titration   - Pt on Connected MOM but irregular with uploads, recommend daily BP assessment  3.  Delivery timing: No meds with BMI >40 - 38 weeks (current)  Single antihypertensive agent with comorbid conditions - 37-38 weeks  Poor control or requiring ? 2 antihypertensive agents - 36-37 weeks      FOLLOW UP:   F/u in 1 week for US/MFM visit        Luis Torres III  Maternal-Fetal  Medicine    Electronically Signed by Luis Torres III June 7, 2023

## 2023-06-07 NOTE — PROGRESS NOTES
Here for routine OB appt at 32w2d, with no complaints.  Reports good FM.  Denies LOF, denies VB, denies contractions.  Reviewed warning signs of Labor and Preeclampsia.  Daily FM counts reinforced.  F/U scheduled 2 weeks  GDM- saw MFM today, started on insulin  Starting PNT twice weekly, fu with MFM in 2 weeks   Received Tdap

## 2023-06-07 NOTE — ASSESSMENT & PLAN NOTE
Prior questionable DM dx vs pre-diabetes (A1c 5.8% on metformin prior to P1), managed with insulin  Negative 2hGTT postpartum  Positive [employer] screen this pregnancy    Current regimen:  - Diet control    Blood sugar review(7 days):  No log today, but pt reports regular blood sugar assessment  Fastings have never been less than 100  Postprandials have ranged from 95 to 160    Gestational diabetes increases risk for LGA, macrosomia,  hypoglycemia,  jaundice and birth trauma, including shoulder dystocia. Mothers with gestational diabetes have increased risk of preeclampsia and  delivery.  We also discussed that she would be at an increased risk to develop diabetes later in life, and for this reason we recommend that she inform her primary care provider of her gestational diabetes for regular glucose screening.? We also recommend that the patient have a 2 hour glucose tolerance test on postpartum day 2 vs at her 6 week postpartum visit.     Recommendations:  1. New regimen:  - Lantus 10 u PM  2. Fasting and 2 hour postprandial blood sugars three times daily  3. Initiate weekly PNT, return in 1 week for BPP and MFM blood sugar check  4. Continue serial monthly growth assessment, next due at 4 weeks, follow with MFM at that time  5. Delivery timing per CHTN guidelines  6. Recommend postpartum screening for underlying glucose intolerance. This can be done at either 6 weeks postpartum -or- on postpartum day 2 while still inpatient.  7. Diabetes screening with primary care provider every 1-3 years

## 2023-06-07 NOTE — PATIENT INSTRUCTIONS
LABOR AND DELIVERY PHONE NUMBER, [phone] (OPEN 24/7, LOCATED ON 6TH FLOOR OF HOSPITAL)  [address] PHONE NUMBER, [phone] (OPEN MON-FRI, 8a-5p)

## 2023-06-07 NOTE — ASSESSMENT & PLAN NOTE
PreE with severe features in G1 requiring 36w delivery  Not taking ASA  Pre-pregnancy regimen:  - No meds  Current regimen:  - No meds  Baseline preE labs:  - Cr 0.8  - AST/ALT 23/34    Chronic hypertension is an increasingly common diagnosis affecting pregnancy. The disease is a risk factor for a number of adverse pregnancy outcomes, both maternal and fetal. Women with preexisting hypertension develop preeclampsia in a quarter of their pregnancies, compared to just 4% of women without preexisting hypertensive disease. CHTN is a risk for intrauterine growth restriction, abruption and stillbirth.    Recommendations:  1. Monthly fetal growth US and prenatal testing as per GDM guidelines  2. Continue current regimen   - Targets for therapy should be 140/90, though at this EGA I recommend against outpatient blood pressure medication initiation/titration   - Pt on Connected MOM but irregular with uploads, recommend daily BP assessment  3.  Delivery timing: No meds with BMI >40 - 38 weeks (current)  Single antihypertensive agent with comorbid conditions - 37-38 weeks  Poor control or requiring ? 2 antihypertensive agents - 36-37 weeks

## 2023-06-09 ENCOUNTER — HOSPITAL ENCOUNTER (OUTPATIENT)
Dept: PERINATAL CARE | Facility: OTHER | Age: 33
Discharge: HOME | End: 2023-06-09
Attending: STUDENT IN AN ORGANIZED HEALTH CARE EDUCATION/TRAINING PROGRAM
Payer: COMMERCIAL

## 2023-06-09 ENCOUNTER — ANESTHESIA EVENT (OUTPATIENT)
Dept: OBSTETRICS AND GYNECOLOGY | Facility: OTHER | Age: 33
End: 2023-06-09

## 2023-06-09 ENCOUNTER — ANESTHESIA (OUTPATIENT)
Dept: OBSTETRICS AND GYNECOLOGY | Facility: OTHER | Age: 33
End: 2023-06-09

## 2023-06-09 ENCOUNTER — HOSPITAL ENCOUNTER (INPATIENT)
Facility: OTHER | Age: 33
LOS: 3 days | Discharge: HOME | DRG: 833 | End: 2023-06-12
Attending: OBSTETRICS & GYNECOLOGY | Admitting: OBSTETRICS & GYNECOLOGY
Payer: COMMERCIAL

## 2023-06-09 DIAGNOSIS — O14.13 SEVERE PRE-ECLAMPSIA IN THIRD TRIMESTER: ICD-10-CM

## 2023-06-09 DIAGNOSIS — O16.9 HYPERTENSION AFFECTING PREGNANCY: ICD-10-CM

## 2023-06-09 DIAGNOSIS — O11.9 CHRONIC HYPERTENSION WITH SUPERIMPOSED PREECLAMPSIA: Primary | ICD-10-CM

## 2023-06-09 DIAGNOSIS — O24.414 INSULIN CONTROLLED GESTATIONAL DIABETES MELLITUS (GDM) IN THIRD TRIMESTER: ICD-10-CM

## 2023-06-09 DIAGNOSIS — F41.9 ANXIETY: ICD-10-CM

## 2023-06-09 DIAGNOSIS — O24.419 GESTATIONAL DIABETES MELLITUS (GDM) AFFECTING PREGNANCY: ICD-10-CM

## 2023-06-09 DIAGNOSIS — Z3A.32 32 WEEKS GESTATION OF PREGNANCY: ICD-10-CM

## 2023-06-09 LAB
ABO + RH BLD: NORMAL
ALBUMIN SERPL BCP-MCNC: 3.3 G/DL (ref 3.5–5.2)
ALP SERPL-CCNC: 104 U/L (ref 55–135)
ALT SERPL W/O P-5'-P-CCNC: 24 U/L (ref 10–44)
ANION GAP SERPL CALC-SCNC: 13 MMOL/L (ref 8–16)
AST SERPL-CCNC: 28 U/L (ref 10–40)
BASOPHILS # BLD AUTO: 0.02 K/UL (ref 0–0.2)
BASOPHILS NFR BLD: 0.2 % (ref 0–1.9)
BILIRUB SERPL-MCNC: 0.3 MG/DL (ref 0.1–1)
BLD GP AB SCN CELLS X3 SERPL QL: NORMAL
BUN SERPL-MCNC: 6 MG/DL (ref 6–20)
CALCIUM SERPL-MCNC: 9.1 MG/DL (ref 8.7–10.5)
CHLORIDE SERPL-SCNC: 105 MMOL/L (ref 95–110)
CO2 SERPL-SCNC: 20 MMOL/L (ref 23–29)
CREAT SERPL-MCNC: 0.7 MG/DL (ref 0.5–1.4)
CREAT UR-MCNC: 61.2 MG/DL (ref 15–325)
DIFFERENTIAL METHOD: ABNORMAL
EOSINOPHIL # BLD AUTO: 0.1 K/UL (ref 0–0.5)
EOSINOPHIL NFR BLD: 0.8 % (ref 0–8)
ERYTHROCYTE [DISTWIDTH] IN BLOOD BY AUTOMATED COUNT: 15.9 % (ref 11.5–14.5)
EST. GFR  (NO RACE VARIABLE): >60 ML/MIN/1.73 M^2
GLUCOSE SERPL-MCNC: 118 MG/DL (ref 70–110)
HCT VFR BLD AUTO: 35.9 % (ref 37–48.5)
HGB BLD-MCNC: 12 G/DL (ref 12–16)
HIV 1+2 AB+HIV1 P24 AG SERPL QL IA: NEGATIVE
IMM GRANULOCYTES # BLD AUTO: 0.03 K/UL (ref 0–0.04)
IMM GRANULOCYTES NFR BLD AUTO: 0.3 % (ref 0–0.5)
LYMPHOCYTES # BLD AUTO: 2.4 K/UL (ref 1–4.8)
LYMPHOCYTES NFR BLD: 25.8 % (ref 18–48)
MCH RBC QN AUTO: 30.2 PG (ref 27–31)
MCHC RBC AUTO-ENTMCNC: 33.4 G/DL (ref 32–36)
MCV RBC AUTO: 90 FL (ref 82–98)
MONOCYTES # BLD AUTO: 0.6 K/UL (ref 0.3–1)
MONOCYTES NFR BLD: 6.9 % (ref 4–15)
NEUTROPHILS # BLD AUTO: 6.1 K/UL (ref 1.8–7.7)
NEUTROPHILS NFR BLD: 66 % (ref 38–73)
NRBC BLD-RTO: 0 /100 WBC
PLATELET # BLD AUTO: 215 K/UL (ref 150–450)
PMV BLD AUTO: 11.2 FL (ref 9.2–12.9)
POCT GLUCOSE: 165 MG/DL (ref 70–110)
POTASSIUM SERPL-SCNC: 3.7 MMOL/L (ref 3.5–5.1)
PROT SERPL-MCNC: 7 G/DL (ref 6–8.4)
PROT UR-MCNC: 7 MG/DL (ref 0–15)
PROT/CREAT UR: 0.11 MG/G{CREAT} (ref 0–0.2)
RBC # BLD AUTO: 3.97 M/UL (ref 4–5.4)
SODIUM SERPL-SCNC: 138 MMOL/L (ref 136–145)
SPECIMEN OUTDATE: NORMAL
WBC # BLD AUTO: 9.27 K/UL (ref 3.9–12.7)

## 2023-06-09 PROCEDURE — 59025 PR FETAL 2N-STRESS TEST: ICD-10-PCS | Mod: 26,59,, | Performed by: OBSTETRICS & GYNECOLOGY

## 2023-06-09 PROCEDURE — 84156 ASSAY OF PROTEIN URINE: CPT | Performed by: OBSTETRICS & GYNECOLOGY

## 2023-06-09 PROCEDURE — 25000003 PHARM REV CODE 250: Performed by: STUDENT IN AN ORGANIZED HEALTH CARE EDUCATION/TRAINING PROGRAM

## 2023-06-09 PROCEDURE — 96376 TX/PRO/DX INJ SAME DRUG ADON: CPT

## 2023-06-09 PROCEDURE — 59025 FETAL NON-STRESS TEST: CPT | Mod: 26,,, | Performed by: OBSTETRICS & GYNECOLOGY

## 2023-06-09 PROCEDURE — 96374 THER/PROPH/DIAG INJ IV PUSH: CPT

## 2023-06-09 PROCEDURE — 36415 COLL VENOUS BLD VENIPUNCTURE: CPT | Performed by: STUDENT IN AN ORGANIZED HEALTH CARE EDUCATION/TRAINING PROGRAM

## 2023-06-09 PROCEDURE — 99285 EMERGENCY DEPT VISIT HI MDM: CPT | Mod: 25,,, | Performed by: OBSTETRICS & GYNECOLOGY

## 2023-06-09 PROCEDURE — 59025 PR FETAL 2N-STRESS TEST: ICD-10-PCS | Mod: 26,,, | Performed by: OBSTETRICS & GYNECOLOGY

## 2023-06-09 PROCEDURE — 80053 COMPREHEN METABOLIC PANEL: CPT | Performed by: OBSTETRICS & GYNECOLOGY

## 2023-06-09 PROCEDURE — 76815 OB US LIMITED FETUS(S): CPT | Mod: 26,,, | Performed by: OBSTETRICS & GYNECOLOGY

## 2023-06-09 PROCEDURE — 86592 SYPHILIS TEST NON-TREP QUAL: CPT | Performed by: OBSTETRICS & GYNECOLOGY

## 2023-06-09 PROCEDURE — 25000003 PHARM REV CODE 250

## 2023-06-09 PROCEDURE — 85025 COMPLETE CBC W/AUTO DIFF WBC: CPT | Performed by: OBSTETRICS & GYNECOLOGY

## 2023-06-09 PROCEDURE — 63600175 PHARM REV CODE 636 W HCPCS: Performed by: OBSTETRICS & GYNECOLOGY

## 2023-06-09 PROCEDURE — 11000001 HC ACUTE MED/SURG PRIVATE ROOM

## 2023-06-09 PROCEDURE — 76815 PR  US,PREGNANT UTERUS,LIMITED, 1/> FETUSES: ICD-10-PCS | Mod: 26,,, | Performed by: OBSTETRICS & GYNECOLOGY

## 2023-06-09 PROCEDURE — 59025 PRENATAL TESTING - NST/AFI: ICD-10-PCS | Mod: 26,,, | Performed by: OBSTETRICS & GYNECOLOGY

## 2023-06-09 PROCEDURE — 99222 1ST HOSP IP/OBS MODERATE 55: CPT | Mod: 25,,, | Performed by: OBSTETRICS & GYNECOLOGY

## 2023-06-09 PROCEDURE — 99285 EMERGENCY DEPT VISIT HI MDM: CPT | Mod: 25

## 2023-06-09 PROCEDURE — 99222 PR INITIAL HOSPITAL CARE,LEVL II: ICD-10-PCS | Mod: 25,,, | Performed by: OBSTETRICS & GYNECOLOGY

## 2023-06-09 PROCEDURE — 25000003 PHARM REV CODE 250: Performed by: GENERAL PRACTICE

## 2023-06-09 PROCEDURE — 83036 HEMOGLOBIN GLYCOSYLATED A1C: CPT | Performed by: STUDENT IN AN ORGANIZED HEALTH CARE EDUCATION/TRAINING PROGRAM

## 2023-06-09 PROCEDURE — 87389 HIV-1 AG W/HIV-1&-2 AB AG IA: CPT | Performed by: OBSTETRICS & GYNECOLOGY

## 2023-06-09 PROCEDURE — 86900 BLOOD TYPING SEROLOGIC ABO: CPT | Performed by: OBSTETRICS & GYNECOLOGY

## 2023-06-09 PROCEDURE — 59025 FETAL NON-STRESS TEST: CPT | Mod: 26,59,, | Performed by: OBSTETRICS & GYNECOLOGY

## 2023-06-09 PROCEDURE — 99285 PR EMERGENCY DEPT VISIT,LEVEL V: ICD-10-PCS | Mod: 25,,, | Performed by: OBSTETRICS & GYNECOLOGY

## 2023-06-09 PROCEDURE — 25000003 PHARM REV CODE 250: Performed by: OBSTETRICS & GYNECOLOGY

## 2023-06-09 PROCEDURE — 96372 THER/PROPH/DIAG INJ SC/IM: CPT | Performed by: OBSTETRICS & GYNECOLOGY

## 2023-06-09 RX ORDER — SIMETHICONE 80 MG
1 TABLET,CHEWABLE ORAL EVERY 6 HOURS PRN
Status: DISCONTINUED | OUTPATIENT
Start: 2023-06-10 | End: 2023-06-12 | Stop reason: HOSPADM

## 2023-06-09 RX ORDER — NIFEDIPINE 10 MG/1
10 CAPSULE ORAL ONCE
Status: DISCONTINUED | OUTPATIENT
Start: 2023-06-09 | End: 2023-06-09

## 2023-06-09 RX ORDER — LABETALOL HYDROCHLORIDE 5 MG/ML
20 INJECTION, SOLUTION INTRAVENOUS ONCE
Status: COMPLETED | OUTPATIENT
Start: 2023-06-09 | End: 2023-06-09

## 2023-06-09 RX ORDER — MAGNESIUM SULFATE HEPTAHYDRATE 40 MG/ML
2 INJECTION, SOLUTION INTRAVENOUS CONTINUOUS
Status: DISCONTINUED | OUTPATIENT
Start: 2023-06-09 | End: 2023-06-10

## 2023-06-09 RX ORDER — SODIUM CHLORIDE, SODIUM LACTATE, POTASSIUM CHLORIDE, CALCIUM CHLORIDE 600; 310; 30; 20 MG/100ML; MG/100ML; MG/100ML; MG/100ML
INJECTION, SOLUTION INTRAVENOUS CONTINUOUS
Status: DISCONTINUED | OUTPATIENT
Start: 2023-06-09 | End: 2023-06-10

## 2023-06-09 RX ORDER — BETAMETHASONE SODIUM PHOSPHATE AND BETAMETHASONE ACETATE 3; 3 MG/ML; MG/ML
12 INJECTION, SUSPENSION INTRA-ARTICULAR; INTRALESIONAL; INTRAMUSCULAR; SOFT TISSUE
Status: COMPLETED | OUTPATIENT
Start: 2023-06-09 | End: 2023-06-10

## 2023-06-09 RX ORDER — DIPHENHYDRAMINE HYDROCHLORIDE 50 MG/ML
25 INJECTION INTRAMUSCULAR; INTRAVENOUS EVERY 4 HOURS PRN
Status: DISCONTINUED | OUTPATIENT
Start: 2023-06-10 | End: 2023-06-12 | Stop reason: HOSPADM

## 2023-06-09 RX ORDER — NIFEDIPINE 10 MG/1
10 CAPSULE ORAL ONCE
Status: COMPLETED | OUTPATIENT
Start: 2023-06-09 | End: 2023-06-09

## 2023-06-09 RX ORDER — LABETALOL HYDROCHLORIDE 5 MG/ML
40 INJECTION, SOLUTION INTRAVENOUS ONCE
Status: COMPLETED | OUTPATIENT
Start: 2023-06-09 | End: 2023-06-09

## 2023-06-09 RX ORDER — INSULIN ASPART 100 [IU]/ML
1-10 INJECTION, SOLUTION INTRAVENOUS; SUBCUTANEOUS
Status: DISCONTINUED | OUTPATIENT
Start: 2023-06-10 | End: 2023-06-10

## 2023-06-09 RX ORDER — PRENATAL WITH FERROUS FUM AND FOLIC ACID 3080; 920; 120; 400; 22; 1.84; 3; 20; 10; 1; 12; 200; 27; 25; 2 [IU]/1; [IU]/1; MG/1; [IU]/1; MG/1; MG/1; MG/1; MG/1; MG/1; MG/1; UG/1; MG/1; MG/1; MG/1; MG/1
1 TABLET ORAL DAILY
Status: DISCONTINUED | OUTPATIENT
Start: 2023-06-10 | End: 2023-06-12 | Stop reason: HOSPADM

## 2023-06-09 RX ORDER — MAGNESIUM SULFATE HEPTAHYDRATE 40 MG/ML
4 INJECTION, SOLUTION INTRAVENOUS ONCE
Status: COMPLETED | OUTPATIENT
Start: 2023-06-09 | End: 2023-06-09

## 2023-06-09 RX ORDER — DIPHENHYDRAMINE HCL 25 MG
25 CAPSULE ORAL EVERY 4 HOURS PRN
Status: DISCONTINUED | OUTPATIENT
Start: 2023-06-10 | End: 2023-06-12 | Stop reason: HOSPADM

## 2023-06-09 RX ORDER — ACETAMINOPHEN 325 MG/1
650 TABLET ORAL EVERY 6 HOURS PRN
Status: DISCONTINUED | OUTPATIENT
Start: 2023-06-10 | End: 2023-06-12 | Stop reason: HOSPADM

## 2023-06-09 RX ORDER — ONDANSETRON 8 MG/1
8 TABLET, ORALLY DISINTEGRATING ORAL EVERY 8 HOURS PRN
Status: DISCONTINUED | OUTPATIENT
Start: 2023-06-10 | End: 2023-06-12 | Stop reason: HOSPADM

## 2023-06-09 RX ORDER — SODIUM CHLORIDE 0.9 % (FLUSH) 0.9 %
10 SYRINGE (ML) INJECTION
Status: DISCONTINUED | OUTPATIENT
Start: 2023-06-10 | End: 2023-06-12 | Stop reason: HOSPADM

## 2023-06-09 RX ORDER — PROCHLORPERAZINE EDISYLATE 5 MG/ML
5 INJECTION INTRAMUSCULAR; INTRAVENOUS EVERY 6 HOURS PRN
Status: DISCONTINUED | OUTPATIENT
Start: 2023-06-10 | End: 2023-06-12 | Stop reason: HOSPADM

## 2023-06-09 RX ORDER — GLUCAGON 1 MG
1 KIT INJECTION
Status: DISCONTINUED | OUTPATIENT
Start: 2023-06-10 | End: 2023-06-10

## 2023-06-09 RX ORDER — DEXTROSE 40 %
15 GEL (GRAM) ORAL
Status: DISCONTINUED | OUTPATIENT
Start: 2023-06-10 | End: 2023-06-10

## 2023-06-09 RX ORDER — DEXTROSE 40 %
30 GEL (GRAM) ORAL
Status: DISCONTINUED | OUTPATIENT
Start: 2023-06-10 | End: 2023-06-10

## 2023-06-09 RX ORDER — AMOXICILLIN 250 MG
1 CAPSULE ORAL NIGHTLY PRN
Status: DISCONTINUED | OUTPATIENT
Start: 2023-06-10 | End: 2023-06-12 | Stop reason: HOSPADM

## 2023-06-09 RX ORDER — NIFEDIPINE 20 MG/1
20 CAPSULE ORAL ONCE
Status: COMPLETED | OUTPATIENT
Start: 2023-06-09 | End: 2023-06-09

## 2023-06-09 RX ORDER — FAMOTIDINE 20 MG/1
20 TABLET, FILM COATED ORAL ONCE
Status: COMPLETED | OUTPATIENT
Start: 2023-06-09 | End: 2023-06-09

## 2023-06-09 RX ORDER — CALCIUM GLUCONATE 98 MG/ML
1 INJECTION, SOLUTION INTRAVENOUS
Status: DISCONTINUED | OUTPATIENT
Start: 2023-06-09 | End: 2023-06-10

## 2023-06-09 RX ORDER — LABETALOL HYDROCHLORIDE 5 MG/ML
40 INJECTION, SOLUTION INTRAVENOUS ONCE
Status: DISCONTINUED | OUTPATIENT
Start: 2023-06-09 | End: 2023-06-10

## 2023-06-09 RX ADMIN — FAMOTIDINE 20 MG: 20 TABLET, FILM COATED ORAL at 11:06

## 2023-06-09 RX ADMIN — LABETALOL HYDROCHLORIDE 20 MG: 5 INJECTION INTRAVENOUS at 07:06

## 2023-06-09 RX ADMIN — MAGNESIUM SULFATE HEPTAHYDRATE 4 G: 40 INJECTION, SOLUTION INTRAVENOUS at 08:06

## 2023-06-09 RX ADMIN — LABETALOL HYDROCHLORIDE 40 MG: 5 INJECTION INTRAVENOUS at 08:06

## 2023-06-09 RX ADMIN — SODIUM CHLORIDE, POTASSIUM CHLORIDE, SODIUM LACTATE AND CALCIUM CHLORIDE: 600; 310; 30; 20 INJECTION, SOLUTION INTRAVENOUS at 08:06

## 2023-06-09 RX ADMIN — NIFEDIPINE 10 MG: 10 CAPSULE ORAL at 04:06

## 2023-06-09 RX ADMIN — INSULIN DETEMIR 10 UNITS: 100 INJECTION, SOLUTION SUBCUTANEOUS at 11:06

## 2023-06-09 RX ADMIN — BETAMETHASONE SODIUM PHOSPHATE AND BETAMETHASONE ACETATE 12 MG: 3; 3 INJECTION, SUSPENSION INTRA-ARTICULAR; INTRALESIONAL; INTRAMUSCULAR at 08:06

## 2023-06-09 RX ADMIN — NIFEDIPINE 20 MG: 20 CAPSULE ORAL at 05:06

## 2023-06-09 RX ADMIN — MAGNESIUM SULFATE IN WATER 2 G/HR: 40 INJECTION, SOLUTION INTRAVENOUS at 08:06

## 2023-06-09 NOTE — ED PROVIDER NOTES
"Encounter Date: 2023       History     Chief Complaint   Patient presents with    Hypertension     HPI    Marcie Cheek is a 33 y.o. [id number] at 32w4d presents from prenatal testing due to severe ranging /100. Patient denies headache, dizziness, visual changes, chest pain, shortness of breath, nausea or vomiting and right upper quadrant pain  She reports feeling fine "like any other day". Pt is upset and crying upon entry into [facility] due to fear she will be admitted repeatedly like last pregnancy. Her partner is at home with their daughter and she reports being alone in the hospital increases her anxiety. She initially declines fetal monitoring, however is agreeable after discussion      This IUP is complicated by A2 gestational diabetes, chronic hypertension with hx PreEclampsia with Severe Features in last pregnancy ( at 36 weeks), AC >99%, short interval pregnancy, and morbid obesity.  Patient denies contractions, denies vaginal bleeding, denies LOF.   Fetal Movement: normal.    Review of patient's allergies indicates:  No Known Allergies  Past Medical History:   Diagnosis Date    Anxiety     Depression     Diabetes mellitus affecting pregnancy in third trimester 3/14/2022    Hypertension      Past Surgical History:   Procedure Laterality Date    HYSTEROSCOPY      WISDOM TOOTH EXTRACTION       Family History   Problem Relation Age of Onset    Thyroid disease Maternal Grandmother     Breast cancer Neg Hx     Colon cancer Neg Hx     Ovarian cancer Neg Hx      Social History     Tobacco Use    Smoking status: Never    Smokeless tobacco: Never   Substance Use Topics    Alcohol use: Not Currently    Drug use: Never     Review of Systems   Constitutional:  Negative for activity change, appetite change and chills.   HENT:  Negative for congestion, sinus pressure and sinus pain.    Eyes:  Negative for photophobia and visual disturbance.   Respiratory:  Negative for chest tightness and shortness of " breath.    Cardiovascular:  Negative for chest pain and palpitations.   Gastrointestinal:  Negative for abdominal distention, abdominal pain and anal bleeding.   Genitourinary:  Negative for pelvic pain.   Musculoskeletal:  Negative for arthralgias and back pain.   Skin:  Negative for color change.   Neurological:  Negative for light-headedness, numbness and headaches.   Psychiatric/Behavioral:  Positive for agitation. Negative for self-injury and suicidal ideas. The patient is nervous/anxious.      Physical Exam     Initial Vitals [06/09/23 1630]   BP Pulse Resp Temp SpO2   (!) 175/86 95 -- -- 99 %      MAP       --         Physical Exam    Vitals reviewed.  Constitutional: She appears well-developed. She is not diaphoretic. She appears distressed.   HENT:   Head: Normocephalic.   Nose: Nose normal.   Eyes: Conjunctivae are normal. No scleral icterus.   Neck:   Normal range of motion.  Cardiovascular:  Normal rate, regular rhythm, normal heart sounds and intact distal pulses.           Pulmonary/Chest: Breath sounds normal. No respiratory distress. She has no wheezes. She exhibits no tenderness.   Abdominal: Abdomen is soft. Bowel sounds are normal. She exhibits no distension. There is no abdominal tenderness.   Large Pannus, reflected for USG to reveal breech presentation and anteflexed uterus   Musculoskeletal:      Cervical back: Normal range of motion.     Neurological: She is alert and oriented to person, place, and time. She has normal strength.   Skin: Skin is warm and dry.   Psychiatric: Thought content normal.   anxious       ED Course   Obtain Fetal nonstress test (NST)    Date/Time: 6/10/2023 4:07 AM  Performed by: Augusta Andrea MD  Authorized by: Valeri Obrien MD     Nonstress Test:     Variability:  6-25 BPM    Decelerations:  None    Accelerations:  15 bpm    Acoustic Stimulator: No      Baseline:  120    Uterine Irritability: No      Contractions:  Not present  Biophysical Profile:      Nonstress Test Interpretation: reactive      Overall Impression:  Reassuring  Post-procedure:     Patient tolerance:  Patient tolerated the procedure well with no immediate complications  Labs Reviewed   PROTEIN / CREATININE RATIO, URINE   CBC W/ AUTO DIFFERENTIAL   COMPREHENSIVE METABOLIC PANEL   RPR   HIV 1 / 2 ANTIBODY          Imaging Results    None     USG: Luis breech presentation, subjectively normal GABBY with good FM.      Medications - No data to display             Attending Attestation:   Physician Attestation Statement for Resident:  As the supervising MD   Physician Attestation Statement: I have personally seen and examined this patient.                    Attending ED Notes:   I have personally seen and examined the patient. I agree with the resident's note . Questions welcomed and answered to patient satisfaction. I have spent 60 minutes with patient discussing risks of preeclampsia with severe features, risks of stroke, abruption, seizure and death for her or infant. We discussed BMZ for infant benefit and goal of reaching 48 hours of steroid benefit if hypertension can be controlled. We discussed limitations of ECV at this gestational age, habitus and uterus, however given variable position and subjectively normal GABBY- infant may spontaneously revert to cephalic. Counseled patient to contact family in [place] regarding admission for childcare support so that  may be present and comfort to her.      @ 32w4d  presenting for Superimposed PreEclampsia with Severe Features  NST: 120/mod/+accels/-decels  [place] quiescent  See counseling notes by myself and Dr. Boecking for [facility] events and initial pt refusals.   Now s/p procardia 10, 20, labetalol 20, 40    Admit for BMZ, magnesium, and hypertensive control under M service. Orders for glycemic control placed.     Augusta Andrea MD  6/10/2023 4:09 AM                     Clinical Impression:     :  Hypertension affecting  pregnancy  Chronic hypertension with superimposed preeclampsia (Primary)  Severe pre-eclampsia in third trimester  32 weeks gestation of pregnancy  Anxiety  Insulin controlled gestational diabetes mellitus (GDM) in third trimester  Breech presentation, single or unspecified fetus              Augusta Andrea MD  06/10/23 0609

## 2023-06-09 NOTE — CARE UPDATE
Resident to bedside for patient having severe range BP and refusing IV.    Pulse:  [] 91  SpO2:  [98 %-100 %] 100 %  BP: (165-179)/(82-86) 165/82    Patient s/p Procardia 10 with sustained severe range BP. Procardia 20 ordered. Repeat /80 while in room. CBC wnl, P:Cr 0.11. CMP pending.    Procardia 20 not given.    Counseled patient on recommendation for IV given severe range BP. Discussed risks of severe range BP including but not limited to placental abruption, and fetal and maternal mortality. Patient verbalized understanding and is amenable to IV if BP still severe if needing Procardia 20.    Discussed recommendation to stay for BP monitoring given severer range BP regardless of lab values. Discussed diagnosis of PreE with SF with BP. Discussed risks of PreE with SF including eclampsia and fetal and maternal death. Patient verbalized understanding. Discussed if patient declines admission would need to sign AMA paperwork. Patient will think about admission pending serial BP and lab values.    Katherine Boecking MD   Ob/Gyn PGY-3

## 2023-06-10 PROBLEM — Z3A.32 32 WEEKS GESTATION OF PREGNANCY: Status: ACTIVE | Noted: 2023-06-10

## 2023-06-10 PROBLEM — O11.9 CHRONIC HYPERTENSION WITH SUPERIMPOSED PRE-ECLAMPSIA: Status: ACTIVE | Noted: 2023-06-10

## 2023-06-10 PROBLEM — E66.9 OBESITY: Status: ACTIVE | Noted: 2023-06-10

## 2023-06-10 LAB
ESTIMATED AVG GLUCOSE: 88 MG/DL (ref 68–131)
GLUCOSE SERPL-MCNC: 133 MG/DL (ref 70–110)
HBA1C MFR BLD: 4.7 % (ref 4–5.6)
POCT GLUCOSE: 112 MG/DL (ref 70–110)
POCT GLUCOSE: 120 MG/DL (ref 70–110)
POCT GLUCOSE: 194 MG/DL (ref 70–110)

## 2023-06-10 PROCEDURE — 25000003 PHARM REV CODE 250: Performed by: STUDENT IN AN ORGANIZED HEALTH CARE EDUCATION/TRAINING PROGRAM

## 2023-06-10 PROCEDURE — 25000003 PHARM REV CODE 250

## 2023-06-10 PROCEDURE — 59025 FETAL NON-STRESS TEST: CPT | Mod: 26,,, | Performed by: OBSTETRICS & GYNECOLOGY

## 2023-06-10 PROCEDURE — 63600175 PHARM REV CODE 636 W HCPCS: Performed by: OBSTETRICS & GYNECOLOGY

## 2023-06-10 PROCEDURE — 82947 ASSAY GLUCOSE BLOOD QUANT: CPT | Performed by: STUDENT IN AN ORGANIZED HEALTH CARE EDUCATION/TRAINING PROGRAM

## 2023-06-10 PROCEDURE — 99232 PR SUBSEQUENT HOSPITAL CARE,LEVL II: ICD-10-PCS | Mod: 25,,, | Performed by: OBSTETRICS & GYNECOLOGY

## 2023-06-10 PROCEDURE — 11000001 HC ACUTE MED/SURG PRIVATE ROOM

## 2023-06-10 PROCEDURE — 59025 PR FETAL 2N-STRESS TEST: ICD-10-PCS | Mod: 26,,, | Performed by: OBSTETRICS & GYNECOLOGY

## 2023-06-10 PROCEDURE — 99232 SBSQ HOSP IP/OBS MODERATE 35: CPT | Mod: 25,,, | Performed by: OBSTETRICS & GYNECOLOGY

## 2023-06-10 PROCEDURE — 63600175 PHARM REV CODE 636 W HCPCS: Performed by: STUDENT IN AN ORGANIZED HEALTH CARE EDUCATION/TRAINING PROGRAM

## 2023-06-10 PROCEDURE — 87081 CULTURE SCREEN ONLY: CPT | Performed by: STUDENT IN AN ORGANIZED HEALTH CARE EDUCATION/TRAINING PROGRAM

## 2023-06-10 PROCEDURE — 36415 COLL VENOUS BLD VENIPUNCTURE: CPT | Performed by: STUDENT IN AN ORGANIZED HEALTH CARE EDUCATION/TRAINING PROGRAM

## 2023-06-10 RX ORDER — IBUPROFEN 200 MG
16 TABLET ORAL
Status: DISCONTINUED | OUTPATIENT
Start: 2023-06-10 | End: 2023-06-12 | Stop reason: HOSPADM

## 2023-06-10 RX ORDER — INSULIN ASPART 100 [IU]/ML
0-5 INJECTION, SOLUTION INTRAVENOUS; SUBCUTANEOUS 4 TIMES DAILY
Status: DISCONTINUED | OUTPATIENT
Start: 2023-06-10 | End: 2023-06-12 | Stop reason: HOSPADM

## 2023-06-10 RX ORDER — IBUPROFEN 200 MG
24 TABLET ORAL
Status: DISCONTINUED | OUTPATIENT
Start: 2023-06-10 | End: 2023-06-12 | Stop reason: HOSPADM

## 2023-06-10 RX ORDER — DEXTROSE MONOHYDRATE 100 MG/ML
12.5 INJECTION, SOLUTION INTRAVENOUS
Status: DISCONTINUED | OUTPATIENT
Start: 2023-06-10 | End: 2023-06-12 | Stop reason: HOSPADM

## 2023-06-10 RX ORDER — FAMOTIDINE 20 MG/1
20 TABLET, FILM COATED ORAL 2 TIMES DAILY
Status: DISCONTINUED | OUTPATIENT
Start: 2023-06-10 | End: 2023-06-11

## 2023-06-10 RX ORDER — GLUCAGON 1 MG
1 KIT INJECTION
Status: DISCONTINUED | OUTPATIENT
Start: 2023-06-10 | End: 2023-06-12 | Stop reason: HOSPADM

## 2023-06-10 RX ORDER — INSULIN ASPART 100 [IU]/ML
0-5 INJECTION, SOLUTION INTRAVENOUS; SUBCUTANEOUS
Status: DISCONTINUED | OUTPATIENT
Start: 2023-06-10 | End: 2023-06-10

## 2023-06-10 RX ADMIN — ACETAMINOPHEN 650 MG: 325 TABLET, FILM COATED ORAL at 06:06

## 2023-06-10 RX ADMIN — INSULIN ASPART 2 UNITS: 100 INJECTION, SOLUTION INTRAVENOUS; SUBCUTANEOUS at 01:06

## 2023-06-10 RX ADMIN — BETAMETHASONE SODIUM PHOSPHATE AND BETAMETHASONE ACETATE 12 MG: 3; 3 INJECTION, SUSPENSION INTRA-ARTICULAR; INTRALESIONAL; INTRAMUSCULAR at 09:06

## 2023-06-10 RX ADMIN — INSULIN DETEMIR 10 UNITS: 100 INJECTION, SOLUTION SUBCUTANEOUS at 09:06

## 2023-06-10 RX ADMIN — FAMOTIDINE 20 MG: 20 TABLET, FILM COATED ORAL at 10:06

## 2023-06-10 NOTE — PLAN OF CARE
VSS. Pt reports +FM. Pt denies ctx, LOF, and vaginal bleeding. Pt denies headache, visual changes, and RUQ pain. NST BID.  Problem: Diabetes in Pregnancy  Goal: Blood Glucose Level Within Targeted Range  Outcome: Ongoing, Progressing     Problem:  Fall Injury Risk  Goal: Absence of Fall, Infant Drop and Related Injury  Outcome: Ongoing, Progressing     Problem: Adult Inpatient Plan of Care  Goal: Plan of Care Review  Outcome: Ongoing, Progressing  Goal: Patient-Specific Goal (Individualized)  Outcome: Ongoing, Progressing  Goal: Absence of Hospital-Acquired Illness or Injury  Outcome: Ongoing, Progressing  Goal: Optimal Comfort and Wellbeing  Outcome: Ongoing, Progressing  Goal: Readiness for Transition of Care  Outcome: Ongoing, Progressing     Problem: Bariatric Environmental Safety  Goal: Safety Maintained with Care  Outcome: Ongoing, Progressing     Problem: Hypertensive Disorders in Pregnancy  Goal: Maternal-Fetal Stabilization  Outcome: Ongoing, Progressing     Problem:  Labor  Goal: Delayed  Delivery  Outcome: Ongoing, Progressing    All questions and concerns answered during the shift. Will continue to monitor.

## 2023-06-10 NOTE — ANESTHESIA PREPROCEDURE EVALUATION
[facility]  Anesthesia Pre-Operative Evaluation         Patient Name: Marcie Cheek  YOB: 1990  MRN: [id number]    2023      Marcie Cheek is a 33 y.o. female  @ 32w4d who presents from the [facility] with elevated blood presssure in the setting of chronic hypertension.  Patient has a history of Pre E with previous pregnancy.     OB History    Para Term  AB Living   2 1   1   1   SAB IAB Ectopic Multiple Live Births         0 1      # Outcome Date GA Lbr Harrison/2nd Weight Sex Delivery Anes PTL Lv   2 Current            1  22 36w4d 31:20 / 01:16 2.79 kg (6 lb 2.4 oz) F Vag-Spont EPI Y TOM       Review of patient's allergies indicates:  No Known Allergies    Wt Readings from Last 1 Encounters:   23 1409 133.2 kg (293 lb 10.4 oz)       BP Readings from Last 3 Encounters:   23 (!) 168/85   23 126/86   23 138/86       Patient Active Problem List   Diagnosis    Foot dermatitis    Obesity affecting pregnancy in second trimester    Chronic hypertension affecting pregnancy    History of pre-eclampsia    Gestational diabetes mellitus (GDM) affecting pregnancy       Past Surgical History:   Procedure Laterality Date    HYSTEROSCOPY      WISDOM TOOTH EXTRACTION         Social History     Socioeconomic History    Marital status:    Tobacco Use    Smoking status: Never    Smokeless tobacco: Never   Substance and Sexual Activity    Alcohol use: Not Currently    Drug use: Never    Sexual activity: Yes     Partners: Male         Chemistry        Component Value Date/Time     2023 1658    K 3.7 2023 1658     2023 1658    CO2 20 (L) 2023 1658    BUN 6 2023 1658    CREATININE 0.7 2023 1658     (H) 2023 1658        Component Value Date/Time    CALCIUM 9.1 2023 1658    ALKPHOS 104 2023 1658    AST 28 2023 1658    ALT 24 2023 1658     BILITOT 0.3 06/09/2023 1658    ESTGFRAFRICA >60 05/11/2022 0252    EGFRNONAA >60 05/11/2022 0252            Lab Results   Component Value Date    WBC 9.27 06/09/2023    HGB 12.0 06/09/2023    HCT 35.9 (L) 06/09/2023    MCV 90 06/09/2023     06/09/2023       No results for input(s): PT, INR, PROTIME, APTT in the last 72 hours.    Pre-op Assessment    I have reviewed the Patient Summary Reports.     I have reviewed the Nursing Notes.    I have reviewed the Medications.     Review of Systems  Anesthesia Hx:  No problems with previous Anesthesia    Hematology/Oncology:  Hematology Normal   Oncology Normal     EENT/Dental:EENT/Dental Normal   Cardiovascular:   Hypertension    Pulmonary:  Pulmonary Normal    Renal/:  Renal/ Normal     Hepatic/GI:  Hepatic/GI Normal    Neurological:   Headaches    Endocrine:   Diabetes    Psych:   anxiety depression          Physical Exam  General: Well nourished, Cooperative, Alert and Oriented  Obese  Airway:  Mallampati: III   Mouth Opening: Normal  TM Distance: Normal  Tongue: Normal  Neck ROM: Normal ROM    Dental:  Intact        Anesthesia Plan  Type of Anesthesia, risks & benefits discussed:    Anesthesia Type: Spinal, Epidural, CSE, Gen ETT  Intra-op Monitoring Plan: Standard ASA Monitors  Post Op Pain Control Plan: multimodal analgesia, epidural analgesia and IV/PO Opioids PRN  Induction:  rapid sequence  Airway Plan: Video, Post-Induction  Informed Consent: Informed consent signed with the Patient and all parties understand the risks and agree with anesthesia plan.  All questions answered. Patient consented to blood products? Yes  ASA Score: 3  Day of Surgery Review of History & Physical: H&P Update referred to the surgeon/provider.    Ready For Surgery From Anesthesia Perspective.     .

## 2023-06-10 NOTE — PROGRESS NOTES
Pt arrived to the [facility] from PNT with severe range BP. Pt refused IV. MD and RN at bedside, RN and MD provided education. Pre-E labs ordered. Procardia 10 ordered after two sustained severe range BP and pt agreed. After Procardia 10 pt still sustaining severe range BP. Pt still refusing IV.  Repeat /80. MD at bedside to talk about IV again. Pt agreed to Procardia 20; if BP still severe pt ok to receive IV placement with no complications.

## 2023-06-10 NOTE — NURSING
Pt spoke with Dr Muse about discontinuing fetal monitoring and leaving AMA. Pt agreed to sign refusal of medical care form and to remain in the hospital. Pt will continue to get magnesium infusion without fetal monitoring. Pt aware of risk and agreed to stay. Will continue to monitor pt.

## 2023-06-10 NOTE — SUBJECTIVE & OBJECTIVE
"Obstetric HPI:  Patient reports denies contractions, active fetal movement, No vaginal bleeding , No loss of fluid     Denies headache, SOB, RUQ pain, scotoma    OB History    Para Term  AB Living   2 1 0 1 0 1   SAB IAB Ectopic Multiple Live Births   0 0 0 0 1      # Outcome Date GA Lbr Harrison/2nd Weight Sex Delivery Anes PTL Lv   2 Current            1  22 36w4d 31:20 / 01:16 2.79 kg (6 lb 2.4 oz) F Vag-Spont EPI Y TOM      Name: SANJANA,GIRL LAN      Apgar1: 4  Apgar5: 6     Past Medical History:   Diagnosis Date    Anxiety     Depression     Diabetes mellitus affecting pregnancy in third trimester 3/14/2022    Hypertension      Past Surgical History:   Procedure Laterality Date    HYSTEROSCOPY      WISDOM TOOTH EXTRACTION         PTA Medications   Medication Sig    insulin (LANTUS SOLOSTAR U-100 INSULIN) glargine 100 units/mL SubQ pen Inject 10 Units into the skin every evening.    pen needle, diabetic 31 gauge x 5/16" Ndle 1 Units by Misc.(Non-Drug; Combo Route) route every evening.    PNV no.1/iron,carb/docus/folic (PREN VIT COMB.1-IRON CB-FA-DSS ORAL) Take by mouth.       Review of patient's allergies indicates:  No Known Allergies     Family History       Problem Relation (Age of Onset)    Thyroid disease Maternal Grandmother          Tobacco Use    Smoking status: Never    Smokeless tobacco: Never   Substance and Sexual Activity    Alcohol use: Not Currently    Drug use: Never    Sexual activity: Yes     Partners: Male     Review of Systems   Constitutional:  Negative for fever.   Respiratory:  Negative for shortness of breath.    Cardiovascular:  Negative for chest pain.   Gastrointestinal:  Negative for abdominal pain, nausea and vomiting.   Endocrine: Negative for hot flashes.   Genitourinary:  Negative for menstrual problem.   Integumentary:  Negative for breast mass, nipple discharge and breast skin changes.   Neurological:  Negative for headaches.   Hematological:  " Does not bruise/bleed easily.   Psychiatric/Behavioral:  Negative for depression.    Breast: Negative for mass, mastodynia, nipple discharge and skin changes   Objective:     Vital Signs (Most Recent):  Pulse: 94 (06/10/23 0400)  BP: 131/72 (06/10/23 0400)  SpO2: 100 % (06/10/23 0259) Vital Signs (24h Range):  Pulse:  [] 94  SpO2:  [96 %-100 %] 100 %  BP: (114-179)/(60-90) 131/72        There is no height or weight on file to calculate BMI.    FHT: 125 bpm, mod BTBV, + accels, - decels Cat 1 (reassuring)  TOCO:  No contractions     Physical Exam:   Constitutional: She is oriented to person, place, and time. She appears well-developed and well-nourished.    HENT:   Head: Normocephalic and atraumatic.    Eyes: EOM are normal.     Cardiovascular:  Normal rate.             Pulmonary/Chest: Effort normal. No respiratory distress.        Abdominal: There is no abdominal tenderness. There is no guarding.   Gravid                 Neurological: She is alert and oriented to person, place, and time.    Skin: Skin is warm and dry.    Psychiatric: She has a normal mood and affect. Her behavior is normal. Thought content normal.      Cervical exam deferred     Significant Labs:  Recent Labs   Lab 06/09/23  1658   WBC 9.27   HGB 12.0   HCT 35.9*   MCV 90          Recent Labs   Lab 06/09/23  1658      K 3.7      CO2 20*   BUN 6   CREATININE 0.7   *   PROT 7.0   BILITOT 0.3   ALKPHOS 104   ALT 24   AST 28        PC ratio: 0.11

## 2023-06-10 NOTE — ASSESSMENT & PLAN NOTE
- Pre-Pregnancy BMI 42   - SCDs encouraged   - Will need postpartum lovenox BID dosing for VTE prophylaxis

## 2023-06-10 NOTE — CARE UPDATE
Resident to bedside to discuss patient care. Patient states she is frustrated with having to stay in the hospital now that her blood pressures are mild range and not severe. Discussed with patient that her blood pressures are lower due to the medication that was given. Patient received Procardia 10/20 and labetalol 20/40 previously. She would like to discontinue magnesium. Patient states she is also uncomfortable in the hospital bed and would like to go home for self monitoring. She reports being admitted for similar reasons (severe range BP) and was discharged after receiving magnesium in a previous pregnancy. I reviewed the reasons for admittance then subsequent discharge in previous pregnancy. Then further discussed with patient that the circumstances are different  this admission and the severe range blood pressures are still concerning given her history.  Discussed with patient fetal and maternal risk of having severe range blood pressures. Patient states she understands the risks but would like to be discharged and follow up with MFM in the morning. Strongly discouraged patient from leaving and the need to sign AMA forms if she is not agreeable with continuing current management recommendations. Discussed with patient that we would not discharge her and she would have to sign out AMA. Patient agrees to staying tonight but declines fetal monitoring. Again discussed fetal risks.       Valeri Obrien MD  OBGYN PGY-1

## 2023-06-10 NOTE — PROGRESS NOTES
RN to bedside after severe range BP. Pt noted to be eating granola bar and peanut butter. RN reiterated importance of adhering to clear liquid diet and discussed severe range BP. Reiterated strong recommendation for CEFM as pt is in high fowlers and declining repositioning of monitor. Pt states she would like to walk around the room. Discussed fall risk on magnesium and danger of ambulating with high BP. Pt desires to walk anyway. Dr. Torres notified. Will retake BP 15 mins after first severe BP at 0900.

## 2023-06-10 NOTE — HOSPITAL COURSE
06/9/2023 Pt presented to [facility] with severe range pressures. Pt asymptomatic. Nifedipine 10 given in [facility] and pt admitted for cHTN with DIANE with SF and started on Magnesium, BMZ course. Pre-E labs, 3T labs, GBS collected

## 2023-06-10 NOTE — H&P
"[facility] - [facility] Delivery  Obstetrics  History & Physical    Patient Name: Marcie Allan  MRN: [id number]  Admission Date: 2023  Primary Care Provider: Primary Doctor No    Subjective:     Principal Problem:Chronic hypertension with superimposed pre-eclampsia    History of Present Illness:  Marcie Allan is a 33 y.o. [id number] at 32w4d who presented to the [facility] due to severe range blood pressures in PNT. Patient denies headache, changes in vision, RUQ pain, CP, or SOB.     This IUP is complicated by cHTN w/ DIANE w/ SF, GDM, h/o of preE, obesity (pre preg BMI 42), and anxiety/depression. Patient denies contractions, denies vaginal bleeding, denies LOF.   Fetal Movement: normal.        Obstetric HPI:  Patient reports denies contractions, active fetal movement, No vaginal bleeding , No loss of fluid     Denies headache, SOB, RUQ pain, scotoma    OB History    Para Term  AB Living   2 1 0 1 0 1   SAB IAB Ectopic Multiple Live Births   0 0 0 0 1      # Outcome Date GA Lbr Harrison/2nd Weight Sex Delivery Anes PTL Lv   2 Current            1  22 36w4d 31:20 / 01:16 2.79 kg (6 lb 2.4 oz) F Vag-Spont EPI Y TOM      Name: CEASAR ALLAN      Apgar1: 4  Apgar5: 6     Past Medical History:   Diagnosis Date    Anxiety     Depression     Diabetes mellitus affecting pregnancy in third trimester 3/14/2022    Hypertension      Past Surgical History:   Procedure Laterality Date    HYSTEROSCOPY      WISDOM TOOTH EXTRACTION         PTA Medications   Medication Sig    insulin (LANTUS SOLOSTAR U-100 INSULIN) glargine 100 units/mL SubQ pen Inject 10 Units into the skin every evening.    pen needle, diabetic 31 gauge x 5/16" Ndle 1 Units by Misc.(Non-Drug; Combo Route) route every evening.    PNV no.1/iron,carb/docus/folic (PREN VIT COMB.1-IRON CB-FA-DSS ORAL) Take by mouth.       Review of patient's allergies indicates:  No Known Allergies     Family History       Problem Relation " (Age of Onset)    Thyroid disease Maternal Grandmother          Tobacco Use    Smoking status: Never    Smokeless tobacco: Never   Substance and Sexual Activity    Alcohol use: Not Currently    Drug use: Never    Sexual activity: Yes     Partners: Male     Review of Systems   Constitutional:  Negative for fever.   Respiratory:  Negative for shortness of breath.    Cardiovascular:  Negative for chest pain.   Gastrointestinal:  Negative for abdominal pain, nausea and vomiting.   Endocrine: Negative for hot flashes.   Genitourinary:  Negative for menstrual problem.   Integumentary:  Negative for breast mass, nipple discharge and breast skin changes.   Neurological:  Negative for headaches.   Hematological:  Does not bruise/bleed easily.   Psychiatric/Behavioral:  Negative for depression.    Breast: Negative for mass, mastodynia, nipple discharge and skin changes   Objective:     Vital Signs (Most Recent):  Pulse: 94 (06/10/23 0400)  BP: 131/72 (06/10/23 0400)  SpO2: 100 % (06/10/23 0259) Vital Signs (24h Range):  Pulse:  [] 94  SpO2:  [96 %-100 %] 100 %  BP: (114-179)/(60-90) 131/72        There is no height or weight on file to calculate BMI.    FHT: 125 bpm, mod BTBV, + accels, - decels Cat 1 (reassuring)  TOCO:  No contractions     Physical Exam:   Constitutional: She is oriented to person, place, and time. She appears well-developed and well-nourished.    HENT:   Head: Normocephalic and atraumatic.    Eyes: EOM are normal.     Cardiovascular:  Normal rate.             Pulmonary/Chest: Effort normal. No respiratory distress.        Abdominal: There is no abdominal tenderness. There is no guarding.   Gravid                 Neurological: She is alert and oriented to person, place, and time.    Skin: Skin is warm and dry.    Psychiatric: She has a normal mood and affect. Her behavior is normal. Thought content normal.      Cervical exam deferred     Significant Labs:  Recent Labs   Lab 06/09/23  1658   WBC  9.27   HGB 12.0   HCT 35.9*   MCV 90          Recent Labs   Lab 23  1658      K 3.7      CO2 20*   BUN 6   CREATININE 0.7   *   PROT 7.0   BILITOT 0.3   ALKPHOS 104   ALT 24   AST 28        PC ratio: 0.11    Assessment/Plan:     33 y.o. female  at 32w5d for:    * Chronic hypertension with superimposed pre-eclampsia  - Patient had severe range blood pressures in the [facility]. She received Procardia 10/20, and labetalol .   Will treat as cHTN w/ DIANE w/ SF due to severely elevated BP at gestational age of 32w5d  - Asymptomatic   - BP: (114-179)/(60-90) 144/81  - Pre E labs:   Cr: 0.7 AST/ALT   - Magnesium prophylaxis initiated   - Patient received BMZ (), GBS and 3T labs collected.   - Continue to monitor for signs/symptoms of PreE       32 weeks gestation of pregnancy  - Primary OB: [place]  - Delivery and blood consents signed   - Continuous monitoring   - Diet:NPO  - 3T labs and GBS collected  - Received Tdap  - Contraception: POP    - Continue PNV            Obesity  - Pre-Pregnancy BMI 42   - SCDs encouraged   - Will need postpartum lovenox BID dosing for VTE prophylaxis          Gestational diabetes mellitus (GDM) affecting pregnancy  Prior questionable DM dx vs pre-diabetes (A1c 5.8% on metformin prior to P1), managed with insulin  -Continue Lantus 10 u PM  - SSI, obtain POCT glucose 2hr pp and fasting        Hanane Muse MD  Obstetrics  [facility] - [facility]

## 2023-06-10 NOTE — HPI
Marcie Cheek is a 33 y.o. [id number] at 32w4d who presented to the [facility] due to severe range blood pressures in PNT. Patient denies headache, changes in vision, RUQ pain, CP, or SOB.     This IUP is complicated by cHTN w/ DIANE w/ SF, GDM, h/o of preE, obesity (pre preg BMI 42), and anxiety/depression. Patient denies contractions, denies vaginal bleeding, denies LOF.   Fetal Movement: normal.

## 2023-06-10 NOTE — ASSESSMENT & PLAN NOTE
Prior questionable DM dx vs pre-diabetes (A1c 5.8% on metformin prior to P1), managed with insulin  -Continue Lantus 10 u PM  - SSI, obtain POCT glucose 2hr pp and fasting

## 2023-06-10 NOTE — ASSESSMENT & PLAN NOTE
- Primary OB: [place]  - Delivery and blood consents signed   - Continuous monitoring   - Diet:NPO  - 3T labs and GBS collected  - Received Tdap  - Contraception: POP    - Continue PNV

## 2023-06-10 NOTE — SUBJECTIVE & OBJECTIVE
"Obstetric HPI:  Patient reports denies contractions, active fetal movement, No vaginal bleeding , No loss of fluid     Denies headache, SOB, RUQ pain, scotoma    OB History    Para Term  AB Living   2 1 0 1 0 1   SAB IAB Ectopic Multiple Live Births   0 0 0 0 1      # Outcome Date GA Lbr Harrison/2nd Weight Sex Delivery Anes PTL Lv   2 Current            1  22 36w4d 31:20 / 01:16 2.79 kg (6 lb 2.4 oz) F Vag-Spont EPI Y TOM      Name: SANJANA,GIRL LAN      Apgar1: 4  Apgar5: 6     Past Medical History:   Diagnosis Date    Anxiety     Depression     Diabetes mellitus affecting pregnancy in third trimester 3/14/2022    Hypertension      Past Surgical History:   Procedure Laterality Date    HYSTEROSCOPY      WISDOM TOOTH EXTRACTION         PTA Medications   Medication Sig    insulin (LANTUS SOLOSTAR U-100 INSULIN) glargine 100 units/mL SubQ pen Inject 10 Units into the skin every evening.    pen needle, diabetic 31 gauge x 5/16" Ndle 1 Units by Misc.(Non-Drug; Combo Route) route every evening.    PNV no.1/iron,carb/docus/folic (PREN VIT COMB.1-IRON CB-FA-DSS ORAL) Take by mouth.       Review of patient's allergies indicates:  No Known Allergies     Family History       Problem Relation (Age of Onset)    Thyroid disease Maternal Grandmother          Tobacco Use    Smoking status: Never    Smokeless tobacco: Never   Substance and Sexual Activity    Alcohol use: Not Currently    Drug use: Never    Sexual activity: Yes     Partners: Male     Review of Systems   Constitutional:  Negative for fever.   Respiratory:  Negative for shortness of breath.    Cardiovascular:  Negative for chest pain.   Gastrointestinal:  Negative for abdominal pain, nausea and vomiting.   Endocrine: Negative for hot flashes.   Genitourinary:  Negative for menstrual problem.   Integumentary:  Negative for breast mass, nipple discharge and breast skin changes.   Neurological:  Negative for headaches.   Hematological:  " Does not bruise/bleed easily.   Psychiatric/Behavioral:  Negative for depression.    Breast: Negative for mass, mastodynia, nipple discharge and skin changes   Objective:     Vital Signs (Most Recent):  Pulse: 100 (06/09/23 2359)  BP: (!) 144/81 (06/09/23 2359)  SpO2: 99 % (06/09/23 2058) Vital Signs (24h Range):  Pulse:  [] 100  SpO2:  [98 %-100 %] 99 %  BP: (114-179)/(60-90) 144/81        There is no height or weight on file to calculate BMI.    FHT: 145 bpm Cat 1 (reassuring)  TOCO:  No contractions     Physical Exam:   Constitutional: She is oriented to person, place, and time. She appears well-developed and well-nourished.    HENT:   Head: Normocephalic and atraumatic.    Eyes: EOM are normal.     Cardiovascular:  Normal rate.             Pulmonary/Chest: Effort normal. No respiratory distress.        Abdominal: There is no abdominal tenderness. There is no guarding.   Gravid                 Neurological: She is alert and oriented to person, place, and time.    Skin: Skin is warm and dry.    Psychiatric: She has a normal mood and affect. Her behavior is normal. Thought content normal.      Cervical exam deferred     Significant Labs:  Recent Labs   Lab 06/09/23  1658   WBC 9.27   HGB 12.0   HCT 35.9*   MCV 90        Recent Labs   Lab 06/09/23  1658      K 3.7      CO2 20*   BUN 6   CREATININE 0.7   *   PROT 7.0   BILITOT 0.3   ALKPHOS 104   ALT 24   AST 28      PC ratio: 0.11

## 2023-06-10 NOTE — NURSING
0225 rn and dr olea at bedside talking to pt. Pt states she no longer wants to wear the EFM and toco monitors and wants to go home. Per dr olea ok to remove monitors and will have an upper level talk to pt and inform her about leaving ama.

## 2023-06-10 NOTE — PROGRESS NOTES
[facility] - [facility]  Obstetrics  Antepartum Progress Note    Patient Name: Lan Cheek  MRN: [id number]  Admission Date: 2023  Hospital Length of Stay: 1 days  Attending Physician: Holley Hernandez MD  Primary Care Provider: Primary Doctor No    Subjective:     Principal Problem:Chronic hypertension with superimposed pre-eclampsia    HPI:  Lan Cheek is a 33 y.o. [id number] at 32w4d who presented to the [facility] due to severe range blood pressures in PNT. Patient denies headache, changes in vision, RUQ pain, CP, or SOB.     This IUP is complicated by cHTN w/ DIANE w/ SF, GDM, h/o of preE, obesity (pre preg BMI 42), and anxiety/depression. Patient denies contractions, denies vaginal bleeding, denies LOF.   Fetal Movement: normal.        Hospital Course:  2023: HD#1 Patient presented to [facility] with severe range pressures. Patient was asymptomatic. She received Procardia 10/20, labetalol 20/40 and was started on Magnesium. BMZ (1/2) given.  Pre-E labs, 3T labs, and GBS was collected.   06/10/2023: HD#2. BP mind range blood pressures following admission. Patient declined fetal monitoring despite counseling by MD. Continue magnesium sulfate for seizure ppx, consider de-escalation this AM.       Obstetric HPI:  Patient reports denies contractions, active fetal movement, No vaginal bleeding , No loss of fluid     Denies headache, SOB, RUQ pain, scotoma    OB History    Para Term  AB Living   2 1 0 1 0 1   SAB IAB Ectopic Multiple Live Births   0 0 0 0 1      # Outcome Date GA Lbr Harrison/2nd Weight Sex Delivery Anes PTL Lv   2 Current            1  22 36w4d 31:20 / 01:16 2.79 kg (6 lb 2.4 oz) F Vag-Spont EPI Y TOM      Name: SANJANAGIRL LAN      Apgar1: 4  Apgar5: 6     Past Medical History:   Diagnosis Date    Anxiety     Depression     Diabetes mellitus affecting pregnancy in third trimester 3/14/2022    Hypertension      Past Surgical History:   Procedure Laterality  "Date    HYSTEROSCOPY  2021    WISDOM TOOTH EXTRACTION         PTA Medications   Medication Sig    insulin (LANTUS SOLOSTAR U-100 INSULIN) glargine 100 units/mL SubQ pen Inject 10 Units into the skin every evening.    pen needle, diabetic 31 gauge x 5/16" Ndle 1 Units by Misc.(Non-Drug; Combo Route) route every evening.    PNV no.1/iron,carb/docus/folic (PREN VIT COMB.1-IRON CB-FA-DSS ORAL) Take by mouth.       Review of patient's allergies indicates:  No Known Allergies     Family History       Problem Relation (Age of Onset)    Thyroid disease Maternal Grandmother          Tobacco Use    Smoking status: Never    Smokeless tobacco: Never   Substance and Sexual Activity    Alcohol use: Not Currently    Drug use: Never    Sexual activity: Yes     Partners: Male     Review of Systems   Constitutional:  Negative for fever.   Respiratory:  Negative for shortness of breath.    Cardiovascular:  Negative for chest pain.   Gastrointestinal:  Negative for abdominal pain, nausea and vomiting.   Endocrine: Negative for hot flashes.   Genitourinary:  Negative for menstrual problem.   Integumentary:  Negative for breast mass, nipple discharge and breast skin changes.   Neurological:  Negative for headaches.   Hematological:  Does not bruise/bleed easily.   Psychiatric/Behavioral:  Negative for depression.    Breast: Negative for mass, mastodynia, nipple discharge and skin changes   Objective:     Vital Signs (Most Recent):  Pulse: 100 (06/09/23 2359)  BP: (!) 144/81 (06/09/23 2359)  SpO2: 99 % (06/09/23 2058) Vital Signs (24h Range):  Pulse:  [] 100  SpO2:  [98 %-100 %] 99 %  BP: (114-179)/(60-90) 144/81        There is no height or weight on file to calculate BMI.    FHT: 125 bpm, mod BTBV, + accels, - decels Cat 1 (reassuring)  TOCO:  No contractions     Physical Exam:   Constitutional: She is oriented to person, place, and time. She appears well-developed and well-nourished.    HENT:   Head: Normocephalic and atraumatic. "    Eyes: EOM are normal.     Cardiovascular:  Normal rate.             Pulmonary/Chest: Effort normal. No respiratory distress.        Abdominal: There is no abdominal tenderness. There is no guarding.   Gravid                 Neurological: She is alert and oriented to person, place, and time.    Skin: Skin is warm and dry.    Psychiatric: She has a normal mood and affect. Her behavior is normal. Thought content normal.      Cervical exam deferred     Significant Labs:  Recent Labs   Lab 23  1658   WBC 9.27   HGB 12.0   HCT 35.9*   MCV 90        Recent Labs   Lab 23  1658      K 3.7      CO2 20*   BUN 6   CREATININE 0.7   *   PROT 7.0   BILITOT 0.3   ALKPHOS 104   ALT 24   AST 28      PC ratio: 0.11    Assessment/Plan:     33 y.o. female  at 32w5d for:    * Chronic hypertension with superimposed pre-eclampsia  - Patient had severe range blood pressures in the [facility]. She received Procardia 10/20, and labetalol .   Will treat as cHTN w/ DIANE w/ SF due to severely elevated BP at gestational age of 32w5d  - Asymptomatic   - BP: (114-179)/(60-90) 144/81  - Pre E labs:   Cr: 0.7 AST/ALT   - Magnesium prophylaxis initiated   - Patient received BMZ (), GBS and 3T labs collected.   - Continue to monitor for signs/symptoms of PreE   - Recommended continuous fetal monitoring. Patient declined continuous monitoring, discussed fetal risks involved with refusing moinitoring. Previous FHT reassuring.       32 weeks gestation of pregnancy  - Primary OB: [place]  - Delivery and blood consents signed  - Diet:NPO  - 3T labs and GBS collected  - Received Tdap  - Contraception: POP    - Continue PNV   - Growth Scan on : EFW 2511g (EFW 62%, AC >99%) @ 32w2d         Obesity  - Pre-Pregnancy BMI 42   - SCDs encouraged   - Will need postpartum lovenox BID dosing for VTE prophylaxis          Gestational diabetes mellitus (GDM) affecting pregnancy  Prior questionable DM dx vs  pre-diabetes (A1c 5.8% on metformin prior to P1), managed with insulin  -Continue Lantus 10 u PM  - SSI, obtain POCT glucose 2hr pp and fasting          Hanane Muse MD  Obstetrics  [facility] - [facility]

## 2023-06-10 NOTE — ASSESSMENT & PLAN NOTE
- Patient had severe range blood pressures in the [facility]. She received Procardia 10/20, and labetalol 20/40.   Will treat as cHTN w/ DIANE w/ SF due to severely elevated BP at gestational age of 32w5d  - Asymptomatic   - BP: (114-179)/(60-90) 144/81  - Pre E labs:  12/36/215 Cr: 0.7 AST/ALT 24/28  - Magnesium prophylaxis initiated   - Patient received BMZ (1/2), GBS and 3T labs collected.   - Continue to monitor for signs/symptoms of PreE

## 2023-06-10 NOTE — HOSPITAL COURSE
06/09/2023: HD#1 Patient presented to [facility] with severe range pressures. Patient was asymptomatic. She received Procardia 10/20, labetalol 20/40 and was started on Magnesium. BMZ (1/2) given.  Pre-E labs, 3T labs, and GBS was collected.   06/10/2023: HD#2. BP mind range blood pressures following admission. Patient declined fetal monitoring despite counseling by MD. Continue magnesium sulfate for seizure ppx, consider de-escalation this AM.   06/11/2023: HD#3. NAEO. Denies PreE symptoms. BP mostly mild range; all other vitals wnl. PreE labs pending. -194. EFM/TOCO reassuring and appropriate for gestational age. Continue to monitor blood pressures. Continue blood glucose patterning.   06/12/2023: HD#4. NAEO. Denies PreE symptoms. BP stable; all other vitals wnl. PreE labs stable. BG . EFM/TOCO reassuring and appropriate for gestational age. Current regimen: Lantus 12u and SSI. Continue to monitor blood pressures. Continue blood glucose patterning.

## 2023-06-10 NOTE — FIRST PROVIDER EVALUATION
Pt care update    Pt presented to [facility] at 1630 with severe ranging Bps, initially refusing IV access, lab draw, NST. Ultimately accepted care and received Procardia 10mg then 20 mg with no significant change in BP. Pt protested due to improper cuff fitting, new appropriately sized cuff placed on left forearm with evidence of continued severe ranging Bps (168/85 at 1830). After multiple lengthy discussions involving Dr. Boecking and myself- pt agreeable to IV access and labetalol. Patient denies headache, dizziness, visual changes, chest pain, shortness of breath, nausea or vomiting and right upper quadrant pain throughout admission.      Patient Vitals for the past 24 hrs:   BP Pulse SpO2   23 1837 (!) 168/85 90 --   23 1836 -- 97 98 %   23 1752 (!) 173/79 -- --   23 1717 (!) 165/82 91 --   23 1716 -- 88 --   23 1714 -- 89 100 %   23 1712 -- 95 --   23 1709 -- 87 100 %   23 1708 -- 86 --   23 1654 -- 96 100 %   23 1650 (!) 179/85 99 --   23 1649 -- 101 99 %   23 1646 -- 87 --   23 1644 -- 97 99 %   23 1642 -- 89 --   23 1639 -- 95 99 %   23 1638 -- 93 --   23 1634 -- 86 98 %   23 1632 -- 91 --   23 1630 (!) 175/86 95 99 %     USG reveals breech infant with severely anteflexed uterus, GABBY subjectively normal. USG notably difficult due to large overlapping pannus and anteroflexed uterus. Pt reports prior pregnancy complicated by variable position as well      Update: Continued severe ranging BP, pt now agreeable to admission, magnesium sulfate and BMZ. Will give additional labetalol 40mg and discuss with MFM.   Discussed with patient that in the event of delivery, additional confirmation scan will be performed but if fetus remains breech-  section is indicated. Pt inquiring for ECV- discussed risks with prematurity, and limitations due to habitus.     Augusta Andrea MD  2023  8:16 PM

## 2023-06-10 NOTE — SUBJECTIVE & OBJECTIVE
"Obstetric HPI:  Patient reports {Uterine contractions:43587} contractions, {Fetal Movement:} fetal movement, {YES:39927} vaginal bleeding , {YES:92081} loss of fluid     This pregnancy has been complicated by ***    OB History    Para Term  AB Living   2 1 0 1 0 1   SAB IAB Ectopic Multiple Live Births   0 0 0 0 1      # Outcome Date GA Lbr Harrison/2nd Weight Sex Delivery Anes PTL Lv   2 Current            1  22 36w4d 31:20 / 01:16 2.79 kg (6 lb 2.4 oz) F Vag-Spont EPI Y TOM      Name: SANJANA,GIRL LAN      Apgar1: 4  Apgar5: 6     Past Medical History:   Diagnosis Date    Anxiety     Depression     Diabetes mellitus affecting pregnancy in third trimester 3/14/2022    Hypertension      Past Surgical History:   Procedure Laterality Date    HYSTEROSCOPY      WISDOM TOOTH EXTRACTION         PTA Medications   Medication Sig    insulin (LANTUS SOLOSTAR U-100 INSULIN) glargine 100 units/mL SubQ pen Inject 10 Units into the skin every evening.    pen needle, diabetic 31 gauge x 5/16" Ndle 1 Units by Misc.(Non-Drug; Combo Route) route every evening.    PNV no.1/iron,carb/docus/folic (PREN VIT COMB.1-IRON CB-FA-DSS ORAL) Take by mouth.       Review of patient's allergies indicates:  No Known Allergies     Family History       Problem Relation (Age of Onset)    Thyroid disease Maternal Grandmother          Tobacco Use    Smoking status: Never    Smokeless tobacco: Never   Substance and Sexual Activity    Alcohol use: Not Currently    Drug use: Never    Sexual activity: Yes     Partners: Male     Review of Systems   Objective:     Vital Signs (Most Recent):  Pulse: 100 (23)  BP: (!) 144/81 (23)  SpO2: 99 % (23) Vital Signs (24h Range):  Pulse:  [] 100  SpO2:  [98 %-100 %] 99 %  BP: (114-179)/(60-90) 144/81        There is no height or weight on file to calculate BMI.    FHT: ***Cat {NUMBERS; 0-3:} ({OB POC FHT:43496})  TOCO:  Q *** " "minutes     Physical Exam     Cervix:  Dilation:  {Select Dilation:32362}  Effacement:  {Select Effacement:94187}  Station: {Select Station:35429}  Presentation: {Select Presentation:56276}     Significant Labs:  Lab Results   Component Value Date    GROUPTRH O POS 06/09/2023    HEPBSAG Non-reactive 12/22/2022    STREPBCULT (A) 04/27/2022     STREPTOCOCCUS AGALACTIAE (GROUP B)  In case of Penicillin allergy, call lab for further testing.  Beta-hemolytic streptococci are routinely susceptible to   penicillins,cephalosporins and carbapenems.  Susceptibility testing not routinely performed         {Results:58559::"I have personallly reviewed all pertinent lab results from the last 24 hours."}  "

## 2023-06-10 NOTE — PROGRESS NOTES
"Pt sitting up in bed, now declines CEFM due to suggested positioning for continous monitoring. States she was not given "papers" to "back up" reason for CEFM and that she would like to know what a "dangerous heart rate" would be. Dr. Torres notified. Educated pt that fetal HR baseline is low at 100 and highly encouraged CEFM. Pt would like to speak to MD first.  "

## 2023-06-11 LAB
ALBUMIN SERPL BCP-MCNC: 3.2 G/DL (ref 3.5–5.2)
ALP SERPL-CCNC: 93 U/L (ref 55–135)
ALT SERPL W/O P-5'-P-CCNC: 18 U/L (ref 10–44)
ANION GAP SERPL CALC-SCNC: 10 MMOL/L (ref 8–16)
AST SERPL-CCNC: 18 U/L (ref 10–40)
BASOPHILS # BLD AUTO: 0.01 K/UL (ref 0–0.2)
BASOPHILS NFR BLD: 0.1 % (ref 0–1.9)
BILIRUB SERPL-MCNC: 0.3 MG/DL (ref 0.1–1)
BUN SERPL-MCNC: 6 MG/DL (ref 6–20)
CALCIUM SERPL-MCNC: 8.8 MG/DL (ref 8.7–10.5)
CHLORIDE SERPL-SCNC: 109 MMOL/L (ref 95–110)
CO2 SERPL-SCNC: 18 MMOL/L (ref 23–29)
CREAT SERPL-MCNC: 0.7 MG/DL (ref 0.5–1.4)
CREAT UR-MCNC: 25.3 MG/DL (ref 15–325)
DIFFERENTIAL METHOD: ABNORMAL
EOSINOPHIL # BLD AUTO: 0 K/UL (ref 0–0.5)
EOSINOPHIL NFR BLD: 0 % (ref 0–8)
ERYTHROCYTE [DISTWIDTH] IN BLOOD BY AUTOMATED COUNT: 16.1 % (ref 11.5–14.5)
EST. GFR  (NO RACE VARIABLE): >60 ML/MIN/1.73 M^2
GLUCOSE SERPL-MCNC: 133 MG/DL (ref 70–110)
HCT VFR BLD AUTO: 32.1 % (ref 37–48.5)
HGB BLD-MCNC: 10.3 G/DL (ref 12–16)
IMM GRANULOCYTES # BLD AUTO: 0.08 K/UL (ref 0–0.04)
IMM GRANULOCYTES NFR BLD AUTO: 0.9 % (ref 0–0.5)
LYMPHOCYTES # BLD AUTO: 1.6 K/UL (ref 1–4.8)
LYMPHOCYTES NFR BLD: 17.8 % (ref 18–48)
MCH RBC QN AUTO: 29.8 PG (ref 27–31)
MCHC RBC AUTO-ENTMCNC: 32.1 G/DL (ref 32–36)
MCV RBC AUTO: 93 FL (ref 82–98)
MONOCYTES # BLD AUTO: 0.4 K/UL (ref 0.3–1)
MONOCYTES NFR BLD: 4.4 % (ref 4–15)
NEUTROPHILS # BLD AUTO: 6.7 K/UL (ref 1.8–7.7)
NEUTROPHILS NFR BLD: 76.8 % (ref 38–73)
NRBC BLD-RTO: 0 /100 WBC
PLATELET # BLD AUTO: 188 K/UL (ref 150–450)
PMV BLD AUTO: 11.3 FL (ref 9.2–12.9)
POCT GLUCOSE: 111 MG/DL (ref 70–110)
POCT GLUCOSE: 112 MG/DL (ref 70–110)
POCT GLUCOSE: 113 MG/DL (ref 70–110)
POCT GLUCOSE: 121 MG/DL (ref 70–110)
POCT GLUCOSE: 122 MG/DL (ref 70–110)
POCT GLUCOSE: 91 MG/DL (ref 70–110)
POTASSIUM SERPL-SCNC: 4.5 MMOL/L (ref 3.5–5.1)
PROT SERPL-MCNC: 6.5 G/DL (ref 6–8.4)
PROT UR-MCNC: <7 MG/DL (ref 0–15)
PROT/CREAT UR: NORMAL MG/G{CREAT} (ref 0–0.2)
RBC # BLD AUTO: 3.46 M/UL (ref 4–5.4)
SODIUM SERPL-SCNC: 137 MMOL/L (ref 136–145)
WBC # BLD AUTO: 8.77 K/UL (ref 3.9–12.7)

## 2023-06-11 PROCEDURE — 59025 FETAL NON-STRESS TEST: CPT | Mod: 26,,, | Performed by: OBSTETRICS & GYNECOLOGY

## 2023-06-11 PROCEDURE — 36415 COLL VENOUS BLD VENIPUNCTURE: CPT | Performed by: STUDENT IN AN ORGANIZED HEALTH CARE EDUCATION/TRAINING PROGRAM

## 2023-06-11 PROCEDURE — 85025 COMPLETE CBC W/AUTO DIFF WBC: CPT | Performed by: STUDENT IN AN ORGANIZED HEALTH CARE EDUCATION/TRAINING PROGRAM

## 2023-06-11 PROCEDURE — 84156 ASSAY OF PROTEIN URINE: CPT | Performed by: STUDENT IN AN ORGANIZED HEALTH CARE EDUCATION/TRAINING PROGRAM

## 2023-06-11 PROCEDURE — 11000001 HC ACUTE MED/SURG PRIVATE ROOM

## 2023-06-11 PROCEDURE — 99232 PR SUBSEQUENT HOSPITAL CARE,LEVL II: ICD-10-PCS | Mod: 25,,, | Performed by: OBSTETRICS & GYNECOLOGY

## 2023-06-11 PROCEDURE — 59025 PR FETAL 2N-STRESS TEST: ICD-10-PCS | Mod: 26,,, | Performed by: OBSTETRICS & GYNECOLOGY

## 2023-06-11 PROCEDURE — 25000003 PHARM REV CODE 250

## 2023-06-11 PROCEDURE — 99232 SBSQ HOSP IP/OBS MODERATE 35: CPT | Mod: 25,,, | Performed by: OBSTETRICS & GYNECOLOGY

## 2023-06-11 PROCEDURE — 25000003 PHARM REV CODE 250: Performed by: STUDENT IN AN ORGANIZED HEALTH CARE EDUCATION/TRAINING PROGRAM

## 2023-06-11 PROCEDURE — 80053 COMPREHEN METABOLIC PANEL: CPT | Performed by: STUDENT IN AN ORGANIZED HEALTH CARE EDUCATION/TRAINING PROGRAM

## 2023-06-11 RX ORDER — FAMOTIDINE 20 MG/1
20 TABLET, FILM COATED ORAL 2 TIMES DAILY
Status: DISCONTINUED | OUTPATIENT
Start: 2023-06-11 | End: 2023-06-12 | Stop reason: HOSPADM

## 2023-06-11 RX ORDER — LANOLIN ALCOHOL/MO/W.PET/CERES
1 CREAM (GRAM) TOPICAL DAILY
Status: DISCONTINUED | OUTPATIENT
Start: 2023-06-11 | End: 2023-06-12 | Stop reason: HOSPADM

## 2023-06-11 RX ORDER — DOCUSATE SODIUM 100 MG/1
100 CAPSULE, LIQUID FILLED ORAL 2 TIMES DAILY
Status: DISCONTINUED | OUTPATIENT
Start: 2023-06-11 | End: 2023-06-12 | Stop reason: HOSPADM

## 2023-06-11 RX ADMIN — FERROUS SULFATE TAB 325 MG (65 MG ELEMENTAL FE) 1 EACH: 325 (65 FE) TAB at 09:06

## 2023-06-11 RX ADMIN — INSULIN DETEMIR 12 UNITS: 100 INJECTION, SOLUTION SUBCUTANEOUS at 09:06

## 2023-06-11 RX ADMIN — PRENATAL VIT W/ FE FUMARATE-FA TAB 27-0.8 MG 1 TABLET: 27-0.8 TAB at 09:06

## 2023-06-11 RX ADMIN — DOCUSATE SODIUM 100 MG: 100 CAPSULE, LIQUID FILLED ORAL at 09:06

## 2023-06-11 RX ADMIN — FAMOTIDINE 20 MG: 20 TABLET, FILM COATED ORAL at 09:06

## 2023-06-11 NOTE — PLAN OF CARE
Problem: Diabetes in Pregnancy  Goal: Blood Glucose Level Within Targeted Range  Outcome: Ongoing, Progressing     Problem:  Fall Injury Risk  Goal: Absence of Fall, Infant Drop and Related Injury  Outcome: Ongoing, Progressing     Problem: Adult Inpatient Plan of Care  Goal: Plan of Care Review  Outcome: Ongoing, Progressing  Goal: Patient-Specific Goal (Individualized)  Outcome: Ongoing, Progressing  Goal: Absence of Hospital-Acquired Illness or Injury  Outcome: Ongoing, Progressing  Goal: Optimal Comfort and Wellbeing  Outcome: Ongoing, Progressing  Goal: Readiness for Transition of Care  Outcome: Ongoing, Progressing     Problem: Bariatric Environmental Safety  Goal: Safety Maintained with Care  Outcome: Ongoing, Progressing     Problem: Hypertensive Disorders in Pregnancy  Goal: Maternal-Fetal Stabilization  Outcome: Ongoing, Progressing     Problem:  Labor  Goal: Delayed  Delivery  Outcome: Ongoing, Progressing

## 2023-06-11 NOTE — PROGRESS NOTES
[facility] - Antepartum  Obstetrics  Antepartum Progress Note    Patient Name: Marcie Cheek  MRN: [id number]  Admission Date: 2023  Hospital Length of Stay: 2 days  Attending Physician: Montez Kim MD  Primary Care Provider: Primary Doctor No    Subjective:     Principal Problem:Chronic hypertension with superimposed pre-eclampsia    HPI:  Marcie Cheek is a 33 y.o. [id number] at 32w4d who presented to the [facility] due to severe range blood pressures in PNT. Patient denies headache, changes in vision, RUQ pain, CP, or SOB.     This IUP is complicated by cHTN w/ DIANE w/ SF, GDM, h/o of preE, obesity (pre preg BMI 42), and anxiety/depression. Patient denies contractions, denies vaginal bleeding, denies LOF.   Fetal Movement: normal.        Hospital Course:  2023: HD#1 Patient presented to [facility] with severe range pressures. Patient was asymptomatic. She received Procardia 10/20, labetalol 20/40 and was started on Magnesium. BMZ (1/2) given.  Pre-E labs, 3T labs, and GBS was collected.   06/10/2023: HD#2. BP mind range blood pressures following admission. Patient declined fetal monitoring despite counseling by MD. Continue magnesium sulfate for seizure ppx, consider de-escalation this AM.   2023: HD#3. NAEO. Denies PreE symptoms. BP mostly mild range; all other vitals wnl. PreE labs pending. -194. EFM/TOCO reassuring and appropriate for gestational age. Continue to monitor blood pressures. Continue blood glucose patterning.       Obstetric HPI:  Patient reports denies contractions, active fetal movement, No vaginal bleeding , No loss of fluid     Denies headache, SOB, RUQ pain, scotoma    OB History    Para Term  AB Living   2 1 0 1 0 1   SAB IAB Ectopic Multiple Live Births   0 0 0 0 1      # Outcome Date GA Lbr Harrison/2nd Weight Sex Delivery Anes PTL Lv   2 Current            1  22 36w4d 31:20 / 01:16 2.79 kg (6 lb 2.4 oz) F Vag-Spont EPI Y TOM      Name:  "SANJANA,GIRL LAN      Apgar1: 4  Apgar5: 6     Past Medical History:   Diagnosis Date    Anxiety     Depression     Diabetes mellitus affecting pregnancy in third trimester 3/14/2022    Hypertension      Past Surgical History:   Procedure Laterality Date    HYSTEROSCOPY  2021    WISDOM TOOTH EXTRACTION         PTA Medications   Medication Sig    insulin (LANTUS SOLOSTAR U-100 INSULIN) glargine 100 units/mL SubQ pen Inject 10 Units into the skin every evening.    pen needle, diabetic 31 gauge x 5/16" Ndle 1 Units by Misc.(Non-Drug; Combo Route) route every evening.    PNV no.1/iron,carb/docus/folic (PREN VIT COMB.1-IRON CB-FA-DSS ORAL) Take by mouth.       Review of patient's allergies indicates:  No Known Allergies     Family History       Problem Relation (Age of Onset)    Thyroid disease Maternal Grandmother          Tobacco Use    Smoking status: Never    Smokeless tobacco: Never   Substance and Sexual Activity    Alcohol use: Not Currently    Drug use: Never    Sexual activity: Yes     Partners: Male     Review of Systems   Constitutional:  Negative for fever.   Respiratory:  Negative for shortness of breath.    Cardiovascular:  Negative for chest pain.   Gastrointestinal:  Negative for abdominal pain, nausea and vomiting.   Endocrine: Negative for hot flashes.   Genitourinary:  Negative for menstrual problem.   Integumentary:  Negative for breast mass, nipple discharge and breast skin changes.   Neurological:  Negative for headaches.   Hematological:  Does not bruise/bleed easily.   Psychiatric/Behavioral:  Negative for depression.    Breast: Negative for mass, mastodynia, nipple discharge and skin changes   Objective:     Vital Signs (Most Recent):  Temp: 98.2 °F (36.8 °C) (06/10/23 1611)  Pulse: 98 (06/10/23 1814)  Resp: 18 (06/10/23 1611)  BP: 134/62 (MD notified; instructed to take BP in 2 hours) (06/10/23 1814)  SpO2: 100 % (06/10/23 1611) Vital Signs (24h Range):  Temp:  [97.7 °F (36.5 °C)-98.2 °F " (36.8 °C)] 98.2 °F (36.8 °C)  Pulse:  [] 98  Resp:  [18] 18  SpO2:  [96 %-100 %] 100 %  BP: (114-174)/() 134/62        There is no height or weight on file to calculate BMI.    FHT: 125 bpm, mod BTBV, + accels, - decels Cat 1 (reassuring)  TOCO:  No contractions     Physical Exam:   Constitutional: She is oriented to person, place, and time. She appears well-developed and well-nourished.    HENT:   Head: Normocephalic and atraumatic.    Eyes: EOM are normal.     Cardiovascular:  Normal rate.             Pulmonary/Chest: Effort normal. No respiratory distress.        Abdominal: There is no abdominal tenderness. There is no guarding.   Gravid                 Neurological: She is alert and oriented to person, place, and time.    Skin: Skin is warm and dry.    Psychiatric: She has a normal mood and affect. Her behavior is normal. Thought content normal.      Cervical exam deferred     Significant Labs:  Recent Labs   Lab 23  1658   WBC 9.27   HGB 12.0   HCT 35.9*   MCV 90          Recent Labs   Lab 23  1658      K 3.7      CO2 20*   BUN 6   CREATININE 0.7   *   PROT 7.0   BILITOT 0.3   ALKPHOS 104   ALT 24   AST 28        PC ratio: 0.11    Assessment/Plan:     33 y.o. female  at 32w6d for:    * Chronic hypertension with superimposed pre-eclampsia  - Patient had severe range blood pressures in the [facility]. She received Procardia 10/20, and labetalol 20/40. On HD#2, patient with multiple severely elevated BP; however, following cuff change, Bps normalized with no medications. Throughout the PM, patient had one non-sustained severely elevated BP. Otherwise,stable.   - Denies PreE symptoms  - Plts 215; repeat CBC pending   - Cr 0.7, AST/ALT 24/28; repeat CMP pending  - PCR 0.11  - Magnesium sulfate not indicated at this time for seizure prophylaxis; will need at time of delivery and for 24 hours after delivery   - Continue ASA   - Continue blood pressure monitoring q 2  hours   - Will re-evaluate patient and escalate if signs/symptoms of PreE   - At this time, difficulty to discern if patient has cHTN exacerbation vs superimposed PreE w/ SF. If patient has the latter, delivery indicated at 34w0d.     32 weeks gestation of pregnancy  - Primary OB: [place]  - Delivery and blood consents reviewed and signed.   - EFM/TOCO: BID   - Diet: Diabetic   - Growth U/S: 2511 66nd%, AC > 99th% at 32w2d ()  - Prenatal Labs: UTD   - Glucose Screening: See A2GDM.   - Received BMZ -06/10  - GBS: pending; will need PCN if delivery imminent prior to result  - Tdap: Received  - Contraception: POP    - Continue PNV    Obesity  - Pre-Pregnancy BMI 42   - SCDs encouraged   - If patient admitted to the antepartum for prolonged in the setting of PreE with SF, will start VTE prophylaxis   - Will need postpartum lovenox BID dosing for VTE prophylaxis      Gestational diabetes mellitus (GDM) affecting pregnancy  - History of DM vs Pre-Diabetes (A1C 5.8% on Metformin prior to P1)   - 2h GTT: negative in postpartum period   - Positive owen coustan screen during this pregnancy   - Continue lantus 10u PM   - SSI per order   - Continue to pattern blood glucose fasting and 2hr pp in the setting of BMZ course administration    Mel Swann MD  Obstetrics  [facility] - Antepartum        Patient seen and examined. 33 year  at 32w6d admitted for CHTN exacebration vs SIP SF    Patient with no complaints today    Temp:  [97.7 °F (36.5 °C)-98.6 °F (37 °C)] 98.6 °F (37 °C)  Pulse:  [] 83  Resp:  [17-18] 17  SpO2:  [97 %-100 %] 97 %  BP: (121-162)/(58-77) 122/58    NST: 125 bpm/moderate variability/ 2+ accels/ one decel at 8:45- continue to monitor however overall reassuring      Plan:  CHTN vs SIP SF- One severe range Bps yesterday afternoon with mostly mild range Bps. Will continue to monitor. Labs stable  A2GDM- Lantus uptitrated to 12 units qhs due to betamethasone course which has been  completed.  3. Routine care: NST BID. Patient desires TID monitoring. Will order for today    Alfred Knowles MD  PGY 7  Maternal Fetal Medicine  [facility]

## 2023-06-11 NOTE — ASSESSMENT & PLAN NOTE
- History of DM vs Pre-Diabetes (A1C 5.8% on Metformin prior to P1)   - 2h GTT: negative in postpartum period   - Positive lexie sanabria screen during this pregnancy   - Continue lantus 10u PM   - SSI per order   - Continue to pattern blood glucose fasting and 2hr pp in the setting of BMZ course administration

## 2023-06-11 NOTE — ASSESSMENT & PLAN NOTE
- Patient had severe range blood pressures in the [facility]. She received Procardia 10/20, and labetalol 20/40. On HD#2, patient with multiple severely elevated BP; however, following cuff change, Bps normalized with no medications. Throughout the PM, patient had one non-sustained severely elevated BP. Otherwise,stable.   - Denies PreE symptoms  - Plts 215; repeat CBC pending   - Cr 0.7, AST/ALT 24/28; repeat CMP pending  - PCR 0.11  - Magnesium sulfate not indicated at this time for seizure prophylaxis; will need at time of delivery and for 24 hours after delivery   - Continue ASA   - Continue blood pressure monitoring q 2 hours   - Will re-evaluate patient and escalate if signs/symptoms of PreE   - At this time, difficulty to discern if patient has cHTN exacerbation vs superimposed PreE w/ SF. If patient has the latter, delivery indicated at 34w0d.

## 2023-06-11 NOTE — ASSESSMENT & PLAN NOTE
- Primary OB: [place]  - Delivery and blood consents reviewed and signed.   - EFM/TOCO: BID   - Diet: Diabetic   - Growth U/S: 2511 66nd%, AC > 99th% at 32w2d (06/07)  - Prenatal Labs: UTD   - Glucose Screening: See A2GDM.   - Received BMZ 06/09-06/10  - GBS: pending; will need PCN if delivery imminent prior to result  - Tdap: Received  - Contraception: POP    - Continue PNV

## 2023-06-11 NOTE — SUBJECTIVE & OBJECTIVE
"Obstetric HPI:  Patient reports denies contractions, active fetal movement, No vaginal bleeding , No loss of fluid     Denies headache, SOB, RUQ pain, scotoma    OB History    Para Term  AB Living   2 1 0 1 0 1   SAB IAB Ectopic Multiple Live Births   0 0 0 0 1      # Outcome Date GA Lbr Harrison/2nd Weight Sex Delivery Anes PTL Lv   2 Current            1  22 36w4d 31:20 / 01:16 2.79 kg (6 lb 2.4 oz) F Vag-Spont EPI Y TOM      Name: SANJANA,GIRL LAN      Apgar1: 4  Apgar5: 6     Past Medical History:   Diagnosis Date    Anxiety     Depression     Diabetes mellitus affecting pregnancy in third trimester 3/14/2022    Hypertension      Past Surgical History:   Procedure Laterality Date    HYSTEROSCOPY      WISDOM TOOTH EXTRACTION         PTA Medications   Medication Sig    insulin (LANTUS SOLOSTAR U-100 INSULIN) glargine 100 units/mL SubQ pen Inject 10 Units into the skin every evening.    pen needle, diabetic 31 gauge x 5/16" Ndle 1 Units by Misc.(Non-Drug; Combo Route) route every evening.    PNV no.1/iron,carb/docus/folic (PREN VIT COMB.1-IRON CB-FA-DSS ORAL) Take by mouth.       Review of patient's allergies indicates:  No Known Allergies     Family History       Problem Relation (Age of Onset)    Thyroid disease Maternal Grandmother          Tobacco Use    Smoking status: Never    Smokeless tobacco: Never   Substance and Sexual Activity    Alcohol use: Not Currently    Drug use: Never    Sexual activity: Yes     Partners: Male     Review of Systems   Constitutional:  Negative for fever.   Respiratory:  Negative for shortness of breath.    Cardiovascular:  Negative for chest pain.   Gastrointestinal:  Negative for abdominal pain, nausea and vomiting.   Endocrine: Negative for hot flashes.   Genitourinary:  Negative for menstrual problem.   Integumentary:  Negative for breast mass, nipple discharge and breast skin changes.   Neurological:  Negative for headaches.   Hematological:  " Does not bruise/bleed easily.   Psychiatric/Behavioral:  Negative for depression.    Breast: Negative for mass, mastodynia, nipple discharge and skin changes   Objective:     Vital Signs (Most Recent):  Temp: 98.2 °F (36.8 °C) (06/10/23 1611)  Pulse: 98 (06/10/23 1814)  Resp: 18 (06/10/23 1611)  BP: 134/62 (MD notified; instructed to take BP in 2 hours) (06/10/23 1814)  SpO2: 100 % (06/10/23 1611) Vital Signs (24h Range):  Temp:  [97.7 °F (36.5 °C)-98.2 °F (36.8 °C)] 98.2 °F (36.8 °C)  Pulse:  [] 98  Resp:  [18] 18  SpO2:  [96 %-100 %] 100 %  BP: (114-174)/() 134/62        There is no height or weight on file to calculate BMI.    FHT: 125 bpm, mod BTBV, + accels, - decels Cat 1 (reassuring)  TOCO:  No contractions     Physical Exam:   Constitutional: She is oriented to person, place, and time. She appears well-developed and well-nourished.    HENT:   Head: Normocephalic and atraumatic.    Eyes: EOM are normal.     Cardiovascular:  Normal rate.             Pulmonary/Chest: Effort normal. No respiratory distress.        Abdominal: There is no abdominal tenderness. There is no guarding.   Gravid                 Neurological: She is alert and oriented to person, place, and time.    Skin: Skin is warm and dry.    Psychiatric: She has a normal mood and affect. Her behavior is normal. Thought content normal.      Cervical exam deferred     Significant Labs:  Recent Labs   Lab 06/09/23  1658   WBC 9.27   HGB 12.0   HCT 35.9*   MCV 90          Recent Labs   Lab 06/09/23  1658      K 3.7      CO2 20*   BUN 6   CREATININE 0.7   *   PROT 7.0   BILITOT 0.3   ALKPHOS 104   ALT 24   AST 28        PC ratio: 0.11

## 2023-06-11 NOTE — ASSESSMENT & PLAN NOTE
- Pre-Pregnancy BMI 42   - SCDs encouraged   - If patient admitted to the antepartum for prolonged in the setting of PreE with SF, will start VTE prophylaxis   - Will need postpartum lovenox BID dosing for VTE prophylaxis

## 2023-06-11 NOTE — CARE UPDATE
EFM/TOCO:     120 bpm, mod variability, + accels, - decels; overall reassuring and appropriate for gestational age   TOCO no contractions noted     Mel Swann MD/MPH  OB/GYN PGY-2  [facility]

## 2023-06-12 ENCOUNTER — TELEPHONE (OUTPATIENT)
Dept: OBSTETRICS AND GYNECOLOGY | Facility: CLINIC | Age: 33
End: 2023-06-12
Payer: COMMERCIAL

## 2023-06-12 VITALS
RESPIRATION RATE: 14 BRPM | TEMPERATURE: 98 F | DIASTOLIC BLOOD PRESSURE: 76 MMHG | SYSTOLIC BLOOD PRESSURE: 141 MMHG | HEART RATE: 89 BPM | OXYGEN SATURATION: 98 %

## 2023-06-12 DIAGNOSIS — O14.93 PRE-ECLAMPSIA IN THIRD TRIMESTER: Primary | ICD-10-CM

## 2023-06-12 LAB
POCT GLUCOSE: 80 MG/DL (ref 70–110)
POCT GLUCOSE: 93 MG/DL (ref 70–110)
RPR SER QL: NORMAL

## 2023-06-12 PROCEDURE — 25000003 PHARM REV CODE 250

## 2023-06-12 PROCEDURE — 99239 PR HOSPITAL DISCHARGE DAY,>30 MIN: ICD-10-PCS | Mod: 25,,, | Performed by: OBSTETRICS & GYNECOLOGY

## 2023-06-12 PROCEDURE — 25000003 PHARM REV CODE 250: Performed by: STUDENT IN AN ORGANIZED HEALTH CARE EDUCATION/TRAINING PROGRAM

## 2023-06-12 PROCEDURE — 59025 PR FETAL 2N-STRESS TEST: ICD-10-PCS | Mod: 26,,, | Performed by: OBSTETRICS & GYNECOLOGY

## 2023-06-12 PROCEDURE — 99239 HOSP IP/OBS DSCHRG MGMT >30: CPT | Mod: 25,,, | Performed by: OBSTETRICS & GYNECOLOGY

## 2023-06-12 PROCEDURE — 59025 FETAL NON-STRESS TEST: CPT | Mod: 26,,, | Performed by: OBSTETRICS & GYNECOLOGY

## 2023-06-12 RX ORDER — ASPIRIN 81 MG/1
81 TABLET ORAL DAILY
Status: DISCONTINUED | OUTPATIENT
Start: 2023-06-12 | End: 2023-06-12 | Stop reason: HOSPADM

## 2023-06-12 RX ORDER — ENOXAPARIN SODIUM 100 MG/ML
40 INJECTION SUBCUTANEOUS EVERY 12 HOURS
Status: DISCONTINUED | OUTPATIENT
Start: 2023-06-12 | End: 2023-06-12

## 2023-06-12 RX ADMIN — ASPIRIN 81 MG: 81 TABLET, COATED ORAL at 10:06

## 2023-06-12 RX ADMIN — PRENATAL VIT W/ FE FUMARATE-FA TAB 27-0.8 MG 1 TABLET: 27-0.8 TAB at 10:06

## 2023-06-12 RX ADMIN — FERROUS SULFATE TAB 325 MG (65 MG ELEMENTAL FE) 1 EACH: 325 (65 FE) TAB at 10:06

## 2023-06-12 RX ADMIN — FAMOTIDINE 20 MG: 20 TABLET, FILM COATED ORAL at 10:06

## 2023-06-12 RX ADMIN — DOCUSATE SODIUM 100 MG: 100 CAPSULE, LIQUID FILLED ORAL at 10:06

## 2023-06-12 NOTE — PROGRESS NOTES
06/12/23 1022   [facility] Patricia Note - Strip   Strip Reviewed by Patricia Nurse? Yes   [facility] Note - Communication   [place] Nurse Communicated with Bedside Nurse Regarding: Fetal Status   [facility] Note - Notification   Nurse Notified? Yes

## 2023-06-12 NOTE — SUBJECTIVE & OBJECTIVE
"Obstetric HPI:  Patient reports denies contractions, active fetal movement, No vaginal bleeding , No loss of fluid     Denies headache, SOB, RUQ pain, scotoma    OB History    Para Term  AB Living   2 1 0 1 0 1   SAB IAB Ectopic Multiple Live Births   0 0 0 0 1      # Outcome Date GA Lbr Harrison/2nd Weight Sex Delivery Anes PTL Lv   2 Current            1  22 36w4d 31:20 / 01:16 2.79 kg (6 lb 2.4 oz) F Vag-Spont EPI Y TOM      Name: SANJANA,GIRL LAN      Apgar1: 4  Apgar5: 6     Past Medical History:   Diagnosis Date    Anxiety     Depression     Diabetes mellitus affecting pregnancy in third trimester 3/14/2022    Hypertension      Past Surgical History:   Procedure Laterality Date    HYSTEROSCOPY      WISDOM TOOTH EXTRACTION         PTA Medications   Medication Sig    insulin (LANTUS SOLOSTAR U-100 INSULIN) glargine 100 units/mL SubQ pen Inject 10 Units into the skin every evening.    pen needle, diabetic 31 gauge x 5/16" Ndle 1 Units by Misc.(Non-Drug; Combo Route) route every evening.    PNV no.1/iron,carb/docus/folic (PREN VIT COMB.1-IRON CB-FA-DSS ORAL) Take by mouth.       Review of patient's allergies indicates:  No Known Allergies     Family History       Problem Relation (Age of Onset)    Thyroid disease Maternal Grandmother          Tobacco Use    Smoking status: Never    Smokeless tobacco: Never   Substance and Sexual Activity    Alcohol use: Not Currently    Drug use: Never    Sexual activity: Yes     Partners: Male     Review of Systems   Constitutional:  Negative for appetite change, diaphoresis, fever and unexpected weight change.   Eyes:  Negative for visual disturbance.   Respiratory:  Negative for cough and shortness of breath.    Cardiovascular:  Negative for chest pain and leg swelling.   Gastrointestinal:  Negative for abdominal pain, constipation, diarrhea, nausea and vomiting.   Endocrine: Negative for hot flashes.   Genitourinary:  Negative for menstrual " problem and vaginal bleeding.   Musculoskeletal:  Negative for arthralgias and back pain.   Integumentary:  Negative for breast mass, nipple discharge and breast skin changes.   Neurological:  Negative for headaches.   Hematological:  Does not bruise/bleed easily.   Psychiatric/Behavioral:  Negative for depression. The patient is not nervous/anxious.    Breast: Negative for mass, mastodynia, nipple discharge and skin changes   Objective:     Vital Signs (Most Recent):  Temp: 98.3 °F (36.8 °C) (06/12/23 0605)  Pulse: 94 (06/12/23 0605)  Resp: 20 (06/12/23 0605)  BP: 137/63 (06/12/23 0605)  SpO2: 99 % (06/12/23 0603) Vital Signs (24h Range):  Temp:  [98 °F (36.7 °C)-98.6 °F (37 °C)] 98.3 °F (36.8 °C)  Pulse:  [] 94  Resp:  [18-20] 20  SpO2:  [93 %-99 %] 99 %  BP: (131-152)/(63-86) 137/63        There is no height or weight on file to calculate BMI.    FHT: 145 bpm, mod variability, + accels, - decels; overall reassuring and appropriate for gestational age  TOCO:  No contractions     Physical Exam:   Constitutional: She is oriented to person, place, and time. She appears well-developed and well-nourished.    HENT:   Head: Normocephalic and atraumatic.    Eyes: Pupils are equal, round, and reactive to light. EOM are normal.     Cardiovascular:  Normal rate.             Pulmonary/Chest: Effort normal. No respiratory distress.        Abdominal: Soft. Bowel sounds are normal. There is no abdominal tenderness. There is no rebound and no guarding.   Gravid     Genitourinary:    Vagina normal.   No  no vaginal discharge in the vagina.           Musculoskeletal: Normal range of motion.       Neurological: She is alert and oriented to person, place, and time.    Skin: Skin is warm and dry.    Psychiatric: She has a normal mood and affect. Her behavior is normal. Judgment and thought content normal.      Cervical exam deferred     Significant Labs:  Recent Labs   Lab 06/09/23  1658 06/11/23  0436   WBC 9.27 8.77   HGB 12.0  10.3*   HCT 35.9* 32.1*   MCV 90 93    188       Recent Labs   Lab 06/09/23  1658 06/11/23  0436    137   K 3.7 4.5    109   CO2 20* 18*   BUN 6 6   CREATININE 0.7 0.7   * 133*   PROT 7.0 6.5   BILITOT 0.3 0.3   ALKPHOS 104 93   ALT 24 18   AST 28 18        PCR TLTC

## 2023-06-12 NOTE — PROGRESS NOTES
[facility], [place], operated by [facility] - Antepartum  Obstetrics  Antepartum Progress Note    Patient Name: Marcie Cheek  MRN: [id number]  Admission Date: 2023  Hospital Length of Stay: 3 days  Attending Physician: Montez Kim MD  Primary Care Provider: Primary Doctor No    Subjective:     Principal Problem:Chronic hypertension with superimposed pre-eclampsia    HPI:  Marcie Cheek is a 33 y.o. [id number] at 32w4d who presented to the [facility] due to severe range blood pressures in PNT. Patient denies headache, changes in vision, RUQ pain, CP, or SOB.     This IUP is complicated by cHTN w/ DIANE w/ SF, GDM, h/o of preE, obesity (pre preg BMI 42), and anxiety/depression. Patient denies contractions, denies vaginal bleeding, denies LOF.   Fetal Movement: normal.        Hospital Course:  2023: HD#1 Patient presented to [facility] with severe range pressures. Patient was asymptomatic. She received Procardia 10/20, labetalol 20/40 and was started on Magnesium. BMZ (1/2) given.  Pre-E labs, 3T labs, and GBS was collected.   06/10/2023: HD#2. BP mind range blood pressures following admission. Patient declined fetal monitoring despite counseling by MD. Continue magnesium sulfate for seizure ppx, consider de-escalation this AM.   2023: HD#3. NAEO. Denies PreE symptoms. BP mostly mild range; all other vitals wnl. PreE labs pending. -194. EFM/TOCO reassuring and appropriate for gestational age. Continue to monitor blood pressures. Continue blood glucose patterning.   2023: HD#4. NAEO. Denies PreE symptoms. BP stable; all other vitals wnl. PreE labs stable. BG . EFM/TOCO reassuring and appropriate for gestational age. Current regimen: Lantus 12u and SSI. Continue to monitor blood pressures. Continue blood glucose patterning.          Obstetric HPI:  Patient reports denies contractions, active fetal movement, No vaginal bleeding , No loss of fluid     Denies headache, SOB, RUQ pain, scotoma    OB History    Para Term  " AB Living   2 1 0 1 0 1   SAB IAB Ectopic Multiple Live Births   0 0 0 0 1      # Outcome Date GA Lbr Harrison/2nd Weight Sex Delivery Anes PTL Lv   2 Current            1  22 36w4d 31:20 / 01:16 2.79 kg (6 lb 2.4 oz) F Vag-Spont EPI Y TOM      Name: CEASAR ALLAN      Apgar1: 4  Apgar5: 6     Past Medical History:   Diagnosis Date    Anxiety     Depression     Diabetes mellitus affecting pregnancy in third trimester 3/14/2022    Hypertension      Past Surgical History:   Procedure Laterality Date    HYSTEROSCOPY      WISDOM TOOTH EXTRACTION         PTA Medications   Medication Sig    insulin (LANTUS SOLOSTAR U-100 INSULIN) glargine 100 units/mL SubQ pen Inject 10 Units into the skin every evening.    pen needle, diabetic 31 gauge x 5/16" Ndle 1 Units by Misc.(Non-Drug; Combo Route) route every evening.    PNV no.1/iron,carb/docus/folic (PREN VIT COMB.1-IRON CB-FA-DSS ORAL) Take by mouth.       Review of patient's allergies indicates:  No Known Allergies     Family History       Problem Relation (Age of Onset)    Thyroid disease Maternal Grandmother          Tobacco Use    Smoking status: Never    Smokeless tobacco: Never   Substance and Sexual Activity    Alcohol use: Not Currently    Drug use: Never    Sexual activity: Yes     Partners: Male     Review of Systems   Constitutional:  Negative for appetite change, diaphoresis, fever and unexpected weight change.   Eyes:  Negative for visual disturbance.   Respiratory:  Negative for cough and shortness of breath.    Cardiovascular:  Negative for chest pain and leg swelling.   Gastrointestinal:  Negative for abdominal pain, constipation, diarrhea, nausea and vomiting.   Endocrine: Negative for hot flashes.   Genitourinary:  Negative for menstrual problem and vaginal bleeding.   Musculoskeletal:  Negative for arthralgias and back pain.   Integumentary:  Negative for breast mass, nipple discharge and breast skin changes.   Neurological:  " Negative for headaches.   Hematological:  Does not bruise/bleed easily.   Psychiatric/Behavioral:  Negative for depression. The patient is not nervous/anxious.    Breast: Negative for mass, mastodynia, nipple discharge and skin changes   Objective:     Vital Signs (Most Recent):  Temp: 98.3 °F (36.8 °C) (06/12/23 0605)  Pulse: 94 (06/12/23 0605)  Resp: 20 (06/12/23 0605)  BP: 137/63 (06/12/23 0605)  SpO2: 99 % (06/12/23 0603) Vital Signs (24h Range):  Temp:  [98 °F (36.7 °C)-98.6 °F (37 °C)] 98.3 °F (36.8 °C)  Pulse:  [] 94  Resp:  [18-20] 20  SpO2:  [93 %-99 %] 99 %  BP: (131-152)/(63-86) 137/63        There is no height or weight on file to calculate BMI.    FHT: 145 bpm, mod variability, + accels, - decels; overall reassuring and appropriate for gestational age  TOCO:  No contractions     Physical Exam:   Constitutional: She is oriented to person, place, and time. She appears well-developed and well-nourished.    HENT:   Head: Normocephalic and atraumatic.    Eyes: Pupils are equal, round, and reactive to light. EOM are normal.     Cardiovascular:  Normal rate.             Pulmonary/Chest: Effort normal. No respiratory distress.        Abdominal: Soft. Bowel sounds are normal. There is no abdominal tenderness. There is no rebound and no guarding.   Gravid     Genitourinary:    Vagina normal.   No  no vaginal discharge in the vagina.           Musculoskeletal: Normal range of motion.       Neurological: She is alert and oriented to person, place, and time.    Skin: Skin is warm and dry.    Psychiatric: She has a normal mood and affect. Her behavior is normal. Judgment and thought content normal.      Cervical exam deferred     Significant Labs:  Recent Labs   Lab 06/09/23  1658 06/11/23  0436   WBC 9.27 8.77   HGB 12.0 10.3*   HCT 35.9* 32.1*   MCV 90 93    188       Recent Labs   Lab 06/09/23  1658 06/11/23  0436    137   K 3.7 4.5    109   CO2 20* 18*   BUN 6 6   CREATININE 0.7 0.7   GLU  118* 133*   PROT 7.0 6.5   BILITOT 0.3 0.3   ALKPHOS 104 93   ALT 24 18   AST 28 18        PCR TLTC     Assessment/Plan:     33 y.o. female  at 33w0d for:    * Chronic hypertension with superimposed pre-eclampsia  - Patient had severe range blood pressures in the [facility]. She received Procardia 10/, and labetalol 20/40. On HD#2, patient with multiple severely elevated BP; however, following cuff change, Bps normalized with no medications. Throughout the PM, patient had one non-sustained severely elevated BP. Otherwise,stable.   - Current regimen:    - No meds   - Plts 188; continue to trend PreE labs q 72 hrs ()  - Cr 0.7, AST/ALT 18/18; continue to trend PreE labs q 72 hrs ()   - PCR TLTC; continue to trend PreE labs q 72 hrs ()   - Magnesium sulfate not indicated at this time for seizure prophylaxis; will need at time of delivery and for 24 hours after delivery  - Continue ASA   - Continue blood pressure monitoring q 4 hrs   - Will re-evaluate patient and escalate if signs/symptoms of PreE   - At this time, difficulty to discern if patient has cHTN exacerbation vs superimposed PreE w/ SF. If patient has the latter, delivery indicated at 34w0d.     33 weeks gestation of pregnancy  - Primary OB: [place]  - Delivery and blood consents reviewed and signed.   - EFM/TOCO: BID; patient prefers TID   - Diet: Diabetic   - Growth U/S: 2511 66nd%, AC > 99th% at 32w2d ()  - Prenatal Labs: UTD   - Glucose Screening: See A2GDM.   - Received BMZ -06/10  - GBS: pending; will need PCN if delivery imminent prior to result  - Tdap: Received  - Contraception: POP    - Continue PNV, ASA    Obesity  - Pre-Pregnancy BMI 42   - SCDs encouraged   - Patient declined prophylactic lovenox for VTE prophylaxis while inpatient, will continue to discuss   - Will need postpartum lovenox BID dosing for VTE prophylaxis    Gestational diabetes mellitus (GDM) affecting pregnancy  - History of DM vs Pre-Diabetes (A1C 5.8%  on Metformin prior to P1)   - 2h GTT: negative in postpartum period   - Positive lexie perkinsstan screen during this pregnancy   - Lantus uptitrated to 12u q PM in the setting of BMZ administration   - SSI per order   - Continue to pattern blood glucose fasting and 2hr pp in the setting of BMZ course administration    Mel Swann MD  Obstetrics  [facility] - Antepartum      Fellow attestation. Patient is a 33 y.o.  at 33w0d admitted to [facility] service for evaluation of elevated blood pressures. Patient now with diagnosis of cHTN with superimposed pre-e with severe features.     Although not currently on any antihypertensive medications, patient required 4 doses of medications in triage (Procardia 10/20 and Labetalol ). Due to gestational age, not started on oral agent.     Patient doing well today without complaints. She is very hesitant to continuing inpatient care.     Temp:  [98 °F (36.7 °C)-98.5 °F (36.9 °C)] 98.3 °F (36.8 °C)  Pulse:  [] 86  Resp:  [14-20] 14  SpO2:  [93 %-99 %] 98 %  BP: (131-152)/(63-86) 141/67    AM NST: Baseline 140s; moderate variability; +accels; no significant decels  No contractions    cHTN with superimposed pre-e with severe features  - Diagnosed based on sustained severe range blood pressures requiring 4 doses of antihypertensive medications for resolution   - Labs have remained overall wnl, P/Cr 0.11  - BMZ /-10    A2GDM  - Lantus 12 u qHS (previously 10 units)     Pt desires to leave AMA today. Counseled on risks of worsening blood pressures and adverse maternal/fetal outcomes. Continue PNT twice weekly, recommend delivery at 34 weeks. Of note, patient has been variable lie - will need confirmation of presentation. Appt with [facility] . Will work to move appt with Dr. Hernandez to this week. Induction request placed today.     Zulema Bermeo MD  PGY 5  Maternal Fetal Medicine  [facility]

## 2023-06-12 NOTE — DISCHARGE SUMMARY
[facility] - Antepartum  Obstetrics  Discharge Summary      Patient Name: Marcie Cheek  MRN: [id number]  Admission Date: 2023  Hospital Length of Stay: 3 days  Discharge Date and Time:  2023 1:30 PM  Attending Physician: Montez Kim MD   Discharging Provider: Katherine C Boecking, MD   Primary Care Provider: Primary Doctor No    HPI: Marcie Cheek is a 33 y.o. [id number] at 32w4d who presented to the [facility] due to severe range blood pressures in PNT. Patient denies headache, changes in vision, RUQ pain, CP, or SOB.     This IUP is complicated by cHTN w/ DIANE w/ SF, GDM, h/o of preE, obesity (pre preg BMI 42), and anxiety/depression. Patient denies contractions, denies vaginal bleeding, denies LOF.   Fetal Movement: normal.        FHT: 145 bpm, mod variability, + accels, - decels; overall reassuring and appropriate for gestational age  TOCO:  No contractions    * No surgery found *     Hospital Course:   2023: HD#1 Patient presented to [facility] with severe range pressures. Patient was asymptomatic. She received Procardia 10/20, labetalol 20/40 and was started on Magnesium. BMZ (1/2) given.  Pre-E labs, 3T labs, and GBS was collected.   06/10/2023: HD#2. BP mind range blood pressures following admission. Patient declined fetal monitoring despite counseling by MD. Continue magnesium sulfate for seizure ppx, consider de-escalation this AM.   2023: HD#3. NAEO. Denies PreE symptoms. BP mostly mild range; all other vitals wnl. PreE labs pending. -194. EFM/TOCO reassuring and appropriate for gestational age. Continue to monitor blood pressures. Continue blood glucose patterning.   2023: HD#4. NAEO. Denies PreE symptoms. BP stable; all other vitals wnl. PreE labs stable. BG . EFM/TOCO reassuring and appropriate for gestational age. Current regimen: Lantus 12u and SSI. Continue to monitor blood pressures. Continue blood glucose patterning.           On day of discharge,  recommendation made to continue inpatient management and blood pressure monitoring and glucose patterning. Discussed risks of underlying PreE with SF. Discussed risks of discharge to home including eclamptic seizure, including but not limited to maternal and fetal morbidity including fetal acidemia, hypoxemia and fetal and maternal death. Patient verbalized understanding and signed AMA papers. Follow up was coordinated with primary OB and PNT. Patient to have outpatient labs in 72 hours. Patient has IOL scheduled on 6/18. Strict precautions given.     Final Active Diagnoses:    Diagnosis Date Noted POA    PRINCIPAL PROBLEM:  Chronic hypertension with superimposed pre-eclampsia [O11.9] 06/10/2023 Unknown    Obesity [E66.9] 06/10/2023 Unknown    33 weeks gestation of pregnancy [Z3A.32] 06/10/2023 Not Applicable    Gestational diabetes mellitus (GDM) affecting pregnancy [O24.419] 05/10/2023 Yes      Problems Resolved During this Admission:          Immunizations     None          This patient has no babies on file.  Pending Diagnostic Studies:     Procedure Component Value Units Date/Time    RPR [[id number]] Collected: 06/09/23 1658    Order Status: Sent Lab Status: In process Updated: 06/09/23 1707    Specimen: Blood           Discharged Condition: good    Disposition: Home or Self Care    Follow Up:   Follow-up Information     Holley Hernandez MD Follow up.    Specialty: Obstetrics and Gynecology  Contact information:  [address] [address] [phone]             [facility] - Maternal Fetal Medicine ([place]) Follow up in 4 day(s).    Specialty: Maternal and Fetal Medicine  Why: Prenatal Testing  Contact information:  [address] [facility] [address] [phone]  Additional information:  Maternal Fetal Medicine - [facility] ([facility]) 4th floor   Please park in [place] and use [place] elevators                     Patient Instructions:      Diet Adult Regular  "    No driving until:   Order Comments: Until not taking narcotics     Notify your health care provider if you experience any of the following:  temperature >100.4     Notify your health care provider if you experience any of the following:  persistent nausea and vomiting or diarrhea     Notify your health care provider if you experience any of the following:  severe uncontrolled pain     Notify your health care provider if you experience any of the following:  severe persistent headache     Notify your health care provider if you experience any of the following:   Order Comments: - Heavy vaginal bleeding  - Regular contractions  - Severe headache, chest pain, shortness of breath, blurry vision, abdominal pain  - BP on connected mom >150/100     Activity as tolerated     Medications:  Current Discharge Medication List      CONTINUE these medications which have NOT CHANGED    Details   insulin (LANTUS SOLOSTAR U-100 INSULIN) glargine 100 units/mL SubQ pen Inject 10 Units into the skin every evening.  Qty: 3 mL, Refills: 1    Associated Diagnoses: Gestational diabetes mellitus (GDM) affecting pregnancy      pen needle, diabetic 31 gauge x 5/16" Ndle 1 Units by Misc.(Non-Drug; Combo Route) route every evening.  Qty: 90 each, Refills: 0    Associated Diagnoses: Gestational diabetes mellitus (GDM) affecting pregnancy      PNV no.1/iron,carb/docus/folic (PREN VIT COMB.1-IRON CB-FA-DSS ORAL) Take by mouth.         STOP taking these medications       aspirin (ECOTRIN) 81 MG EC tablet Comments:   Reason for Stopping:               Katherine C Boecking, MD  Obstetrics  [facility] - Antepartum  "

## 2023-06-12 NOTE — ASSESSMENT & PLAN NOTE
- History of DM vs Pre-Diabetes (A1C 5.8% on Metformin prior to P1)   - 2h GTT: negative in postpartum period   - Positive lexie sanabria screen during this pregnancy   - Lantus uptitrated to 12u q PM in the setting of BMZ administration   - SSI per order   - Continue to pattern blood glucose fasting and 2hr pp in the setting of BMZ course administration

## 2023-06-12 NOTE — NURSING
BP taken with hospital BP cuff, BP reading 141/76. Pt BP taken with Connected MOMs BP cuff reading 163/96. Pt home [facility] BP cuff reading 163/95. MD Swann notified via telephone. Per MD, same BP precautions given. Pt educated to return to [facility] for signs or symptoms of preeclampsia such as HA, visual disturbances, epigastric pain, or SOB. Pt also educated to return to [facility] for Bps 160 or greater systolic or 110 or greater diastolic.

## 2023-06-12 NOTE — ASSESSMENT & PLAN NOTE
- Primary OB: [facility]  - Delivery and blood consents reviewed and signed.   - EFM/TOCO: BID; patient prefers TID   - Diet: Diabetic   - Growth U/S: 2511 66nd%, AC > 99th% at 32w2d (06/07)  - Prenatal Labs: UTD   - Glucose Screening: See A2GDM.   - Received BMZ 06/09-06/10  - GBS: pending; will need PCN if delivery imminent prior to result  - Tdap: Received  - Contraception: POP    - Continue PNV, ASA

## 2023-06-12 NOTE — PLAN OF CARE
Problem: Diabetes in Pregnancy  Goal: Blood Glucose Level Within Targeted Range  Outcome: Met     Problem:  Fall Injury Risk  Goal: Absence of Fall, Infant Drop and Related Injury  Outcome: Met     Problem: Adult Inpatient Plan of Care  Goal: Plan of Care Review  Outcome: Met  Goal: Patient-Specific Goal (Individualized)  Outcome: Met  Goal: Absence of Hospital-Acquired Illness or Injury  Outcome: Met  Goal: Optimal Comfort and Wellbeing  Outcome: Met  Goal: Readiness for Transition of Care  Outcome: Met     Problem: Bariatric Environmental Safety  Goal: Safety Maintained with Care  Outcome: Met     Problem: Hypertensive Disorders in Pregnancy  Goal: Maternal-Fetal Stabilization  Outcome: Met     Problem:  Labor  Goal: Delayed  Delivery  Outcome: Met     Problem: Fall Injury Risk  Goal: Absence of Fall and Fall-Related Injury  Outcome: Met     Problem: Maternal-Fetal Wellbeing  Goal: Optimal Maternal-Fetal Wellbeing  Outcome: Met    labor precautions given. Diabetic and hypertensive precautions given to pt. Pt verbalizes understanding at this time of when to notify MD of any new or worsening symptoms. Pt discussed with MD that she would prefer outpatient management, AMA paper signed and witnessed. Pt discharged to home accompanied by significant other.

## 2023-06-12 NOTE — TELEPHONE ENCOUNTER
----- Message from Katherine C Boecking, MD sent at 6/12/2023  3:02 PM CDT -----  Thank you!    CBC, CMP, Protein/Creatine ratio. Sorry for the confusion.    ----- Message -----  From: Patricia Arora MA  Sent: 6/12/2023   2:56 PM CDT  To: Katherine C Boecking, MD    I am getting her scheduled with Dr. Guadarrama in Dr. Hernandez's absence this wk. I have her Friday PNT appt scheduled as she already had one scheduled for Wed. What is Pre E labs?   ----- Message -----  From: Katherine C Boecking, MD  Sent: 6/12/2023   1:08 PM CDT  To: Holley Hernandez MD, Mel Swann MD, #    Hi! This patient was admitted for blood pressure monitoring and is signing out AMA today. We have given her strict return precautions. There are a few things she will need scheduled upon her discharge.     She has an appointment on 6/21 with Dr. Hernandez, but can this be changed to this week? It can be with an alternative provider if Dr. Hernandez is out.     She has PNT scheduled tomorrow, Tuesday 6/13. She will also need PNT scheduled for Friday, 6/16 and continue PNT 2x weekly on Mondays/Fridays per M recommendations (so really just tomorrow and Friday).     Lastly, she will need q72 hour PreE labs upon discharge.     She is scheduled for IOL on 06/18 at [facility].     Thank you!  Katherine Boecking MD   Ob/Gyn PGY-3

## 2023-06-12 NOTE — CARE UPDATE
EFM/TOCO:     130 bpm, mod variability, + accels, - decels; overall reassuring and appropriate for gestational age   TOCO no contractions noted     Temp:  [98 °F (36.7 °C)-98.6 °F (37 °C)] 98.5 °F (36.9 °C)  Pulse:  [] 99  Resp:  [17] 17  SpO2:  [97 %-99 %] 98 %  BP: (122-152)/(58-86) 149/83     Sang Kovacs M.D.  OB/GYN PGY-3

## 2023-06-12 NOTE — ASSESSMENT & PLAN NOTE
- Pre-Pregnancy BMI 42   - SCDs encouraged   - Patient declined prophylactic lovenox for VTE prophylaxis while inpatient, will continue to discuss   - Will need postpartum lovenox BID dosing for VTE prophylaxis

## 2023-06-12 NOTE — ASSESSMENT & PLAN NOTE
- Patient had severe range blood pressures in the [facility]. She received Procardia 10/20, and labetalol 20/40. On HD#2, patient with multiple severely elevated BP; however, following cuff change, Bps normalized with no medications. Throughout the PM, patient had one non-sustained severely elevated BP. Otherwise,stable.   - Current regimen:    - No meds   - Plts 188; continue to trend PreE labs q 72 hrs (06/14)  - Cr 0.7, AST/ALT 18/18; continue to trend PreE labs q 72 hrs (06/14)   - PCR TLTC; continue to trend PreE labs q 72 hrs (06/14)   - Magnesium sulfate not indicated at this time for seizure prophylaxis; will need at time of delivery and for 24 hours after delivery  - Continue ASA   - Continue blood pressure monitoring q 4 hrs   - Will re-evaluate patient and escalate if signs/symptoms of PreE   - At this time, difficulty to discern if patient has cHTN exacerbation vs superimposed PreE w/ SF. If patient has the latter, delivery indicated at 34w0d.

## 2023-06-13 LAB — BACTERIA SPEC AEROBE CULT: NORMAL

## 2023-06-14 ENCOUNTER — HOSPITAL ENCOUNTER (OUTPATIENT)
Dept: PERINATAL CARE | Facility: OTHER | Age: 33
Discharge: HOME | End: 2023-06-14
Attending: STUDENT IN AN ORGANIZED HEALTH CARE EDUCATION/TRAINING PROGRAM
Payer: COMMERCIAL

## 2023-06-14 ENCOUNTER — PATIENT MESSAGE (OUTPATIENT)
Dept: OBSTETRICS AND GYNECOLOGY | Facility: CLINIC | Age: 33
End: 2023-06-14
Payer: COMMERCIAL

## 2023-06-14 ENCOUNTER — ROUTINE PRENATAL (OUTPATIENT)
Dept: OBSTETRICS AND GYNECOLOGY | Facility: CLINIC | Age: 33
End: 2023-06-14
Payer: COMMERCIAL

## 2023-06-14 VITALS
DIASTOLIC BLOOD PRESSURE: 92 MMHG | SYSTOLIC BLOOD PRESSURE: 134 MMHG | WEIGHT: 285.94 LBS | BODY MASS INDEX: 44.78 KG/M2

## 2023-06-14 DIAGNOSIS — O24.419 GESTATIONAL DIABETES MELLITUS (GDM) AFFECTING PREGNANCY: ICD-10-CM

## 2023-06-14 DIAGNOSIS — O14.13 SEVERE PRE-ECLAMPSIA IN THIRD TRIMESTER: Primary | ICD-10-CM

## 2023-06-14 DIAGNOSIS — Z3A.33 33 WEEKS GESTATION OF PREGNANCY: ICD-10-CM

## 2023-06-14 PROCEDURE — 0502F SUBSEQUENT PRENATAL CARE: CPT | Mod: CPTII,S$GLB,, | Performed by: STUDENT IN AN ORGANIZED HEALTH CARE EDUCATION/TRAINING PROGRAM

## 2023-06-14 PROCEDURE — 76818 PRENATAL TESTING - NST/AFI: ICD-10-PCS | Mod: 26,,, | Performed by: OBSTETRICS & GYNECOLOGY

## 2023-06-14 PROCEDURE — 99999 PR PBB SHADOW E&M-EST. PATIENT-LVL III: ICD-10-PCS | Mod: PBBFAC,,, | Performed by: STUDENT IN AN ORGANIZED HEALTH CARE EDUCATION/TRAINING PROGRAM

## 2023-06-14 PROCEDURE — 76818 FETAL BIOPHYS PROFILE W/NST: CPT | Mod: 26,,, | Performed by: OBSTETRICS & GYNECOLOGY

## 2023-06-14 PROCEDURE — 76818 FETAL BIOPHYS PROFILE W/NST: CPT

## 2023-06-14 PROCEDURE — 0502F PR SUBSEQUENT PRENATAL CARE: ICD-10-PCS | Mod: CPTII,S$GLB,, | Performed by: STUDENT IN AN ORGANIZED HEALTH CARE EDUCATION/TRAINING PROGRAM

## 2023-06-14 PROCEDURE — 99999 PR PBB SHADOW E&M-EST. PATIENT-LVL III: CPT | Mod: PBBFAC,,, | Performed by: STUDENT IN AN ORGANIZED HEALTH CARE EDUCATION/TRAINING PROGRAM

## 2023-06-14 NOTE — PROGRESS NOTES
Pt here for OB visit. cHTN with DIANE declining inpatient management. BP today 134/92.  No HA, vision changes, RUQ/epigastric pain, no CP/SOB. No painful ctx/cramping, no vaginal bleeding, no leaking fluid. +FM.   She is checking BP and BGs at home. Reports no issues with BGs at home following steroids last week.      Very strict precautions reviewed  Has PNT today  Has MFM f/u Friday    Message sent to Dr Hernandez's office.

## 2023-06-15 ENCOUNTER — TELEPHONE (OUTPATIENT)
Dept: OBSTETRICS AND GYNECOLOGY | Facility: HOSPITAL | Age: 33
End: 2023-06-15
Payer: COMMERCIAL

## 2023-06-15 ENCOUNTER — PATIENT MESSAGE (OUTPATIENT)
Dept: OBSTETRICS AND GYNECOLOGY | Facility: CLINIC | Age: 33
End: 2023-06-15
Payer: COMMERCIAL

## 2023-06-15 ENCOUNTER — TELEPHONE (OUTPATIENT)
Dept: OBSTETRICS AND GYNECOLOGY | Facility: CLINIC | Age: 33
End: 2023-06-15
Payer: COMMERCIAL

## 2023-06-15 ENCOUNTER — PATIENT MESSAGE (OUTPATIENT)
Dept: MATERNAL FETAL MEDICINE | Facility: CLINIC | Age: 33
End: 2023-06-15
Payer: COMMERCIAL

## 2023-06-15 NOTE — TELEPHONE ENCOUNTER
----- Message from Julia Holbrook MD sent at 6/14/2023  9:52 AM CDT -----  Regarding: touch base w pt  Hi this patient was admitted at [facility] for observation and advised to stay inpatient and deliver at 34 weeks. Pt decliend and is following up outpatient. She was hoping to hear from Dr Hernandez to discuss admission and plan for delivery etc. If  out this week please let her know so that she knows why she hasn't heard from her! Ty

## 2023-06-16 ENCOUNTER — OFFICE VISIT (OUTPATIENT)
Dept: MATERNAL FETAL MEDICINE | Facility: CLINIC | Age: 33
End: 2023-06-16
Payer: COMMERCIAL

## 2023-06-16 ENCOUNTER — HOSPITAL ENCOUNTER (OUTPATIENT)
Dept: PERINATAL CARE | Facility: OTHER | Age: 33
Discharge: HOME | End: 2023-06-16
Attending: STUDENT IN AN ORGANIZED HEALTH CARE EDUCATION/TRAINING PROGRAM
Payer: COMMERCIAL

## 2023-06-16 ENCOUNTER — TELEPHONE (OUTPATIENT)
Dept: OBSTETRICS AND GYNECOLOGY | Facility: CLINIC | Age: 33
End: 2023-06-16
Payer: COMMERCIAL

## 2023-06-16 ENCOUNTER — PROCEDURE VISIT (OUTPATIENT)
Dept: MATERNAL FETAL MEDICINE | Facility: CLINIC | Age: 33
End: 2023-06-16
Attending: STUDENT IN AN ORGANIZED HEALTH CARE EDUCATION/TRAINING PROGRAM
Payer: COMMERCIAL

## 2023-06-16 VITALS — DIASTOLIC BLOOD PRESSURE: 82 MMHG | SYSTOLIC BLOOD PRESSURE: 128 MMHG

## 2023-06-16 VITALS
WEIGHT: 288.81 LBS | BODY MASS INDEX: 45.33 KG/M2 | HEIGHT: 67 IN | DIASTOLIC BLOOD PRESSURE: 82 MMHG | SYSTOLIC BLOOD PRESSURE: 128 MMHG

## 2023-06-16 DIAGNOSIS — O24.419 GESTATIONAL DIABETES MELLITUS (GDM) AFFECTING PREGNANCY: ICD-10-CM

## 2023-06-16 DIAGNOSIS — O11.9 CHRONIC HYPERTENSION WITH SUPERIMPOSED PRE-ECLAMPSIA: ICD-10-CM

## 2023-06-16 PROCEDURE — 99999 PR PBB SHADOW E&M-EST. PATIENT-LVL III: ICD-10-PCS | Mod: PBBFAC,,, | Performed by: OBSTETRICS & GYNECOLOGY

## 2023-06-16 PROCEDURE — 3079F PR MOST RECENT DIASTOLIC BLOOD PRESSURE 80-89 MM HG: ICD-10-PCS | Mod: CPTII,S$GLB,, | Performed by: OBSTETRICS & GYNECOLOGY

## 2023-06-16 PROCEDURE — 3074F SYST BP LT 130 MM HG: CPT | Mod: CPTII,S$GLB,, | Performed by: OBSTETRICS & GYNECOLOGY

## 2023-06-16 PROCEDURE — 3008F BODY MASS INDEX DOCD: CPT | Mod: CPTII,S$GLB,, | Performed by: OBSTETRICS & GYNECOLOGY

## 2023-06-16 PROCEDURE — 59025 FETAL NON-STRESS TEST: CPT | Mod: 26,,, | Performed by: OBSTETRICS & GYNECOLOGY

## 2023-06-16 PROCEDURE — 1159F PR MEDICATION LIST DOCUMENTED IN MEDICAL RECORD: ICD-10-PCS | Mod: CPTII,S$GLB,, | Performed by: OBSTETRICS & GYNECOLOGY

## 2023-06-16 PROCEDURE — 99214 PR OFFICE/OUTPT VISIT, EST, LEVL IV, 30-39 MIN: ICD-10-PCS | Mod: 25,S$GLB,, | Performed by: OBSTETRICS & GYNECOLOGY

## 2023-06-16 PROCEDURE — 1159F MED LIST DOCD IN RCRD: CPT | Mod: CPTII,S$GLB,, | Performed by: OBSTETRICS & GYNECOLOGY

## 2023-06-16 PROCEDURE — 99999 PR PBB SHADOW E&M-EST. PATIENT-LVL III: CPT | Mod: PBBFAC,,, | Performed by: OBSTETRICS & GYNECOLOGY

## 2023-06-16 PROCEDURE — 3044F PR MOST RECENT HEMOGLOBIN A1C LEVEL <7.0%: ICD-10-PCS | Mod: CPTII,S$GLB,, | Performed by: OBSTETRICS & GYNECOLOGY

## 2023-06-16 PROCEDURE — 99214 OFFICE O/P EST MOD 30 MIN: CPT | Mod: 25,S$GLB,, | Performed by: OBSTETRICS & GYNECOLOGY

## 2023-06-16 PROCEDURE — 59025 PRENATAL TESTING - NST/AFI: ICD-10-PCS | Mod: 26,,, | Performed by: OBSTETRICS & GYNECOLOGY

## 2023-06-16 PROCEDURE — 76819 PR US, OB, FETAL BIOPHYSICAL, W/O NST: ICD-10-PCS | Mod: S$GLB,,, | Performed by: OBSTETRICS & GYNECOLOGY

## 2023-06-16 PROCEDURE — 3044F HG A1C LEVEL LT 7.0%: CPT | Mod: CPTII,S$GLB,, | Performed by: OBSTETRICS & GYNECOLOGY

## 2023-06-16 PROCEDURE — 3008F PR BODY MASS INDEX (BMI) DOCUMENTED: ICD-10-PCS | Mod: CPTII,S$GLB,, | Performed by: OBSTETRICS & GYNECOLOGY

## 2023-06-16 PROCEDURE — 76819 FETAL BIOPHYS PROFIL W/O NST: CPT | Mod: S$GLB,,, | Performed by: OBSTETRICS & GYNECOLOGY

## 2023-06-16 PROCEDURE — 1111F PR DISCHARGE MEDS RECONCILED W/ CURRENT OUTPATIENT MED LIST: ICD-10-PCS | Mod: CPTII,S$GLB,, | Performed by: OBSTETRICS & GYNECOLOGY

## 2023-06-16 PROCEDURE — 59025 FETAL NON-STRESS TEST: CPT

## 2023-06-16 PROCEDURE — 1111F DSCHRG MED/CURRENT MED MERGE: CPT | Mod: CPTII,S$GLB,, | Performed by: OBSTETRICS & GYNECOLOGY

## 2023-06-16 PROCEDURE — 3074F PR MOST RECENT SYSTOLIC BLOOD PRESSURE < 130 MM HG: ICD-10-PCS | Mod: CPTII,S$GLB,, | Performed by: OBSTETRICS & GYNECOLOGY

## 2023-06-16 PROCEDURE — 3079F DIAST BP 80-89 MM HG: CPT | Mod: CPTII,S$GLB,, | Performed by: OBSTETRICS & GYNECOLOGY

## 2023-06-16 NOTE — PROGRESS NOTES
"Maternal Fetal Medicine follow up consult    SUBJECTIVE:     Marcie Cheek is a 33 y.o.  female with IUP at 33w4d who is seen in follow up consultation by MFM.  Pregnancy complications include:   Problem   Chronic Hypertension With Superimposed Pre-Eclampsia   Gestational Diabetes Mellitus (Gdm) Affecting Pregnancy       Previous notes reviewed.   No changes to medical, surgical, family, social, or obstetric history.    Interval history since last MFM visit: left the hospital on . Has had no problems since then. Checking BP at home and reports they are normal    Medications:  Current Outpatient Medications   Medication Instructions    insulin (LANTUS SOLOSTAR U-100 INSULIN) 10 Units, Subcutaneous, Nightly    pen needle, diabetic 1 Units, Misc.(Non-Drug; Combo Route), Nightly    PNV no.1/iron,carb/docus/folic (PREN VIT COMB.1-IRON CB-FA-DSS ORAL) Oral       Care team members:  Dr. Hernandez - Primary OB       OBJECTIVE:   /82 (BP Location: Left arm, Patient Position: Sitting, BP Method: Medium (Manual))   Ht 5' 7" (1.702 m)   Wt 131 kg (288 lb 12.8 oz)   LMP 10/19/2022 (Approximate)   BMI 45.23 kg/m²     Ultrasound performed. See viewpoint for full ultrasound report.  BPP         ASSESSMENT/PLAN:     33 y.o.  female with IUP at 33w4d    Gestational diabetes mellitus (GDM) affecting pregnancy  Prior questionable DM dx vs pre-diabetes (A1c 5.8% on metformin prior to P1), managed with insulin  Negative 2hGTT postpartum  Positive [employer] screen this pregnancy    Current regimen:  - Lantus 10 u PM    Blood sugar review  Fastings for the most part in the high 90s to low 100s  Postprandials have ranged from 84 to 140s    Recommendations:  1. New regimen: Lantus 11 units PM    2. Fasting and 2 hour postprandial blood sugars three times daily  3. Continue twice weekly PNT, return in 1 week for BPP and MFM blood sugar check  4.. Delivery timing per [place] guidelines  5. Recommend " postpartum screening for underlying glucose intolerance. This can be done at either 6 weeks postpartum -or- on postpartum day 2 while still inpatient.  6. Diabetes screening with primary care provider every 1-3 years    Chronic hypertension with superimposed pre-eclampsia  PreE with severe features in G1 requiring 36w delivery  Not taking ASA  Pre-pregnancy regimen: No meds  Current regimen:  No meds1  Baseline preE labs: - Cr 0.8 - AST/ALT 23/34    Patient had severe range blood pressures in the J[facility] last week which required Procardia 10/20, and labetalol 20/40. On HD#2, patient with multiple severely elevated BP; however, following cuff change, Bps normalized with no medications. Throughout the PM, patient had one non-sustained severely elevated BP. Otherwise,stable. Labs were all normal on repeat assessment and protein was to low to quantitate. She was given a presumptive diagnosis of CHTN with DIANE with SF but declined continued inpatient care. She left the hospital on  and denies any problems since then.  She denies any preeclampsia symptoms.  She is reporting her blood pressures at home are all in the normal range in less than 140/90.  She is due to have repeat labs today.    /82 today.     Recommendations:  1. Continue twice weekly  testing and labs  2. Continue close observation of blood pressure.  If she develops any severe range blood pressures would move to delivery.  If she develops any symptoms at all would move to delivery.  If there are any worsening in her laboratory test results would move to delivery.  3. At this point, it remains unclear if she truly has superimposed preeclampsia versus worsening chronic hypertension.  If all of her labs and testing remained normal, it would be reasonable to allow the pregnancy to progress to 37 weeks, but would have a very low threshold given her history for moving to delivery sooner.     F/u in 1 weeks for MFM visit  Continue twice weekly   testing and labs.    Spoke with Dr. Hernandez re: plan of care.    Today I have spent 35 minutes in the care of the patient. This includes face to face time and non-face to face time preparing to see the patient (eg, review of tests), obtaining and/or reviewing separately obtained history, documenting clinical information in the electronic or other health record, independently interpreting results and communicating results to the patient/family/caregiver, or care coordination.

## 2023-06-16 NOTE — TELEPHONE ENCOUNTER
Patient came into the office for a blood pressure check.   Ry blood pressure taken on LT arm, with large cuff.   Pts blood pressure updated in Remote bp reading  Chart updated  Pt has no concerns or questions at this time

## 2023-06-16 NOTE — ASSESSMENT & PLAN NOTE
Prior questionable DM dx vs pre-diabetes (A1c 5.8% on metformin prior to P1), managed with insulin  Negative 2hGTT postpartum  Positive [employer] screen this pregnancy    Current regimen:  - Lantus 10 u PM    Blood sugar review  Fastings for the most part in the high 90s to low 100s  Postprandials have ranged from 84 to 140s    Recommendations:  1. New regimen: Lantus 11 units PM    2. Fasting and 2 hour postprandial blood sugars three times daily  3. Continue twice weekly PNT, return in 1 week for BPP and MFM blood sugar check  4.. Delivery timing per [place] guidelines  5. Recommend postpartum screening for underlying glucose intolerance. This can be done at either 6 weeks postpartum -or- on postpartum day 2 while still inpatient.  6. Diabetes screening with primary care provider every 1-3 years

## 2023-06-16 NOTE — ASSESSMENT & PLAN NOTE
PreE with severe features in G1 requiring 36w delivery  Not taking ASA  Pre-pregnancy regimen: No meds  Current regimen:  No meds1  Baseline preE labs: - Cr 0.8 - AST/ALT /    Patient had severe range blood pressures in the JENNI last week which required Procardia 10/20, and labetalol /. On HD#2, patient with multiple severely elevated BP; however, following cuff change, Bps normalized with no medications. Throughout the PM, patient had one non-sustained severely elevated BP. Otherwise,stable. Labs were all normal on repeat assessment and protein was to low to quantitate. She was given a presumptive diagnosis of CHTN with DIANE with SF but declined continued inpatient care. She left the hospital on  and denies any problems since then.  She denies any preeclampsia symptoms.  She is reporting her blood pressures at home are all in the normal range in less than 140/90.  She is due to have repeat labs today.    /82 today.     Recommendations:  1. Continue twice weekly  testing and labs  2. Continue close observation of blood pressure.  If she develops any severe range blood pressures would move to delivery.  If she develops any symptoms at all would move to delivery.  If there are any worsening in her laboratory test results would move to delivery.  3. At this point, it remains unclear if she truly has superimposed preeclampsia versus worsening chronic hypertension.  If all of her labs and testing remained normal, it would be reasonable to allow the pregnancy to progress to 37 weeks, but would have a very low threshold given her history for moving to delivery sooner.

## 2023-06-21 ENCOUNTER — OFFICE VISIT (OUTPATIENT)
Dept: OBSTETRICS AND GYNECOLOGY | Facility: CLINIC | Age: 33
End: 2023-06-21
Payer: COMMERCIAL

## 2023-06-21 DIAGNOSIS — O11.9 CHRONIC HYPERTENSION WITH SUPERIMPOSED PRE-ECLAMPSIA: Primary | ICD-10-CM

## 2023-06-21 PROCEDURE — 0502F PR SUBSEQUENT PRENATAL CARE: ICD-10-PCS | Mod: CPTII,95,, | Performed by: STUDENT IN AN ORGANIZED HEALTH CARE EDUCATION/TRAINING PROGRAM

## 2023-06-21 PROCEDURE — 3044F PR MOST RECENT HEMOGLOBIN A1C LEVEL <7.0%: ICD-10-PCS | Mod: CPTII,95,, | Performed by: STUDENT IN AN ORGANIZED HEALTH CARE EDUCATION/TRAINING PROGRAM

## 2023-06-21 PROCEDURE — 3044F HG A1C LEVEL LT 7.0%: CPT | Mod: CPTII,95,, | Performed by: STUDENT IN AN ORGANIZED HEALTH CARE EDUCATION/TRAINING PROGRAM

## 2023-06-21 PROCEDURE — 0502F SUBSEQUENT PRENATAL CARE: CPT | Mod: CPTII,95,, | Performed by: STUDENT IN AN ORGANIZED HEALTH CARE EDUCATION/TRAINING PROGRAM

## 2023-06-21 NOTE — PROGRESS NOTES
The patient location is:  Patient Home   The chief complaint leading to consultation is: REGAN  Visit type: Virtual visit with synchronous audio and video  Total time spent with patient: 30 min  Each patient to whom he or she provides medical services by telemedicine is:  (1) informed of the relationship between the physician and patient and the respective role of any other health care provider with respect to management of the patient; and (2) notified that he or she may decline to receive medical services by telemedicine and may withdraw from such care at any time.    Pt doing well. She was recently hospitalized and diagnosed with cHTN with SI PreE. She is not on any medication at this point for HTN and is normal to mild range BP at home  She had labs last week that were normal. She has MFM in Friday  We have reviewed the initial recommendation to IOL 35 weeks, but then discussed that with BP and labs normal, it is reasonable to wait til 37 weeks. Pt opts for 35 week as she has history of severe pre e with other pregnancy and is trying to avoid magnesium. Also, baby is suspected to be macrosomia at this point already   Denies vaginal bleeding, LOF, contractions. Reports regular fetal movement. Denies symptoms of pre E.  Connected mom reviewed.  Reviewed consents  Reviewed routines/expectations on L&D/MBU  Labor and pre E precautions reviewed.   All questions answered  RTC: 2 weeks

## 2023-06-21 NOTE — PATIENT INSTRUCTIONS
[facility], Labor and Delivery is on the 6th floor of [facility]: [phone]    [email or web address]

## 2023-06-22 ENCOUNTER — PATIENT MESSAGE (OUTPATIENT)
Dept: OBSTETRICS AND GYNECOLOGY | Facility: CLINIC | Age: 33
End: 2023-06-22
Payer: COMMERCIAL

## 2023-06-23 ENCOUNTER — PROCEDURE VISIT (OUTPATIENT)
Dept: MATERNAL FETAL MEDICINE | Facility: CLINIC | Age: 33
End: 2023-06-23
Attending: STUDENT IN AN ORGANIZED HEALTH CARE EDUCATION/TRAINING PROGRAM
Payer: COMMERCIAL

## 2023-06-23 ENCOUNTER — OFFICE VISIT (OUTPATIENT)
Dept: MATERNAL FETAL MEDICINE | Facility: CLINIC | Age: 33
End: 2023-06-23
Payer: COMMERCIAL

## 2023-06-23 ENCOUNTER — ROUTINE PRENATAL (OUTPATIENT)
Dept: OBSTETRICS AND GYNECOLOGY | Facility: CLINIC | Age: 33
End: 2023-06-23
Payer: COMMERCIAL

## 2023-06-23 VITALS
BODY MASS INDEX: 45.51 KG/M2 | BODY MASS INDEX: 45.51 KG/M2 | DIASTOLIC BLOOD PRESSURE: 84 MMHG | DIASTOLIC BLOOD PRESSURE: 84 MMHG | SYSTOLIC BLOOD PRESSURE: 122 MMHG | WEIGHT: 290.56 LBS | WEIGHT: 290.56 LBS | SYSTOLIC BLOOD PRESSURE: 122 MMHG

## 2023-06-23 VITALS
DIASTOLIC BLOOD PRESSURE: 80 MMHG | BODY MASS INDEX: 45.54 KG/M2 | WEIGHT: 290.81 LBS | SYSTOLIC BLOOD PRESSURE: 132 MMHG

## 2023-06-23 DIAGNOSIS — O24.419 GESTATIONAL DIABETES MELLITUS (GDM) AFFECTING PREGNANCY: ICD-10-CM

## 2023-06-23 DIAGNOSIS — O11.9 CHRONIC HYPERTENSION WITH SUPERIMPOSED PRE-ECLAMPSIA: ICD-10-CM

## 2023-06-23 DIAGNOSIS — O24.414 INSULIN CONTROLLED GESTATIONAL DIABETES MELLITUS (GDM) IN THIRD TRIMESTER: ICD-10-CM

## 2023-06-23 DIAGNOSIS — O09.93 SUPERVISION OF HIGH RISK PREGNANCY IN THIRD TRIMESTER: Primary | ICD-10-CM

## 2023-06-23 DIAGNOSIS — Z3A.34 34 WEEKS GESTATION OF PREGNANCY: ICD-10-CM

## 2023-06-23 PROCEDURE — 99214 PR OFFICE/OUTPT VISIT, EST, LEVL IV, 30-39 MIN: ICD-10-PCS | Mod: 25,S$GLB,, | Performed by: OBSTETRICS & GYNECOLOGY

## 2023-06-23 PROCEDURE — 99999 PR PBB SHADOW E&M-EST. PATIENT-LVL II: ICD-10-PCS | Mod: PBBFAC,,, | Performed by: OBSTETRICS & GYNECOLOGY

## 2023-06-23 PROCEDURE — 99214 OFFICE O/P EST MOD 30 MIN: CPT | Mod: 25,S$GLB,, | Performed by: OBSTETRICS & GYNECOLOGY

## 2023-06-23 PROCEDURE — 3074F PR MOST RECENT SYSTOLIC BLOOD PRESSURE < 130 MM HG: ICD-10-PCS | Mod: CPTII,S$GLB,, | Performed by: OBSTETRICS & GYNECOLOGY

## 2023-06-23 PROCEDURE — 1159F MED LIST DOCD IN RCRD: CPT | Mod: CPTII,S$GLB,, | Performed by: OBSTETRICS & GYNECOLOGY

## 2023-06-23 PROCEDURE — 1111F PR DISCHARGE MEDS RECONCILED W/ CURRENT OUTPATIENT MED LIST: ICD-10-PCS | Mod: CPTII,S$GLB,, | Performed by: OBSTETRICS & GYNECOLOGY

## 2023-06-23 PROCEDURE — 99999 PR PBB SHADOW E&M-EST. PATIENT-LVL III: CPT | Mod: PBBFAC,,, | Performed by: OBSTETRICS & GYNECOLOGY

## 2023-06-23 PROCEDURE — 3079F DIAST BP 80-89 MM HG: CPT | Mod: CPTII,S$GLB,, | Performed by: OBSTETRICS & GYNECOLOGY

## 2023-06-23 PROCEDURE — 1159F PR MEDICATION LIST DOCUMENTED IN MEDICAL RECORD: ICD-10-PCS | Mod: CPTII,S$GLB,, | Performed by: OBSTETRICS & GYNECOLOGY

## 2023-06-23 PROCEDURE — 3074F SYST BP LT 130 MM HG: CPT | Mod: CPTII,S$GLB,, | Performed by: OBSTETRICS & GYNECOLOGY

## 2023-06-23 PROCEDURE — 3079F PR MOST RECENT DIASTOLIC BLOOD PRESSURE 80-89 MM HG: ICD-10-PCS | Mod: CPTII,S$GLB,, | Performed by: OBSTETRICS & GYNECOLOGY

## 2023-06-23 PROCEDURE — 1111F DSCHRG MED/CURRENT MED MERGE: CPT | Mod: CPTII,S$GLB,, | Performed by: OBSTETRICS & GYNECOLOGY

## 2023-06-23 PROCEDURE — 0502F PR SUBSEQUENT PRENATAL CARE: ICD-10-PCS | Mod: CPTII,S$GLB,, | Performed by: OBSTETRICS & GYNECOLOGY

## 2023-06-23 PROCEDURE — 76819 FETAL BIOPHYS PROFIL W/O NST: CPT | Mod: S$GLB,,, | Performed by: OBSTETRICS & GYNECOLOGY

## 2023-06-23 PROCEDURE — 3044F HG A1C LEVEL LT 7.0%: CPT | Mod: CPTII,S$GLB,, | Performed by: OBSTETRICS & GYNECOLOGY

## 2023-06-23 PROCEDURE — 76819 PR US, OB, FETAL BIOPHYSICAL, W/O NST: ICD-10-PCS | Mod: S$GLB,,, | Performed by: OBSTETRICS & GYNECOLOGY

## 2023-06-23 PROCEDURE — 99999 PR PBB SHADOW E&M-EST. PATIENT-LVL III: ICD-10-PCS | Mod: PBBFAC,,, | Performed by: OBSTETRICS & GYNECOLOGY

## 2023-06-23 PROCEDURE — 3044F PR MOST RECENT HEMOGLOBIN A1C LEVEL <7.0%: ICD-10-PCS | Mod: CPTII,S$GLB,, | Performed by: OBSTETRICS & GYNECOLOGY

## 2023-06-23 PROCEDURE — 0502F SUBSEQUENT PRENATAL CARE: CPT | Mod: CPTII,S$GLB,, | Performed by: OBSTETRICS & GYNECOLOGY

## 2023-06-23 PROCEDURE — 3008F PR BODY MASS INDEX (BMI) DOCUMENTED: ICD-10-PCS | Mod: CPTII,S$GLB,, | Performed by: OBSTETRICS & GYNECOLOGY

## 2023-06-23 PROCEDURE — 3008F BODY MASS INDEX DOCD: CPT | Mod: CPTII,S$GLB,, | Performed by: OBSTETRICS & GYNECOLOGY

## 2023-06-23 PROCEDURE — 99999 PR PBB SHADOW E&M-EST. PATIENT-LVL II: CPT | Mod: PBBFAC,,, | Performed by: OBSTETRICS & GYNECOLOGY

## 2023-06-23 NOTE — PROGRESS NOTES
Patient seen and examined for cervical check prior to induction.  S/p MFM US and visit today.  Denies HA,vision changes, CP, SOB,RUQ pain.  + FM. SVE FT/T/H.  IOL scheduled 6/27.  Reviewed OB ED precautions.

## 2023-06-23 NOTE — PROGRESS NOTES
Maternal Fetal Medicine follow up consult    SUBJECTIVE:     Marcie Cheek is a 33 y.o.  female with IUP at 34w4d who is seen in follow up consultation by MFM.  Pregnancy complications include:   Problem   Chronic Hypertension With Superimposed Pre-Eclampsia   Gestational Diabetes Mellitus (Gdm) Affecting Pregnancy       Previous notes reviewed.   No changes to medical, surgical, family, social, or obstetric history.    Interval history since last MFM visit:   Patient has no complaints today. She is overall feeling well.  Patient denies any contractions/cramping, vaginal bleeding or leakage of fluid.  She reports good fetal movement.    Medications:  Current Outpatient Medications   Medication Instructions    insulin (LANTUS SOLOSTAR U-100 INSULIN) 10 Units, Subcutaneous, Nightly    pen needle, diabetic 1 Units, Misc.(Non-Drug; Combo Route), Nightly    PNV no.1/iron,carb/docus/folic (PREN VIT COMB.1-IRON CB-FA-DSS ORAL) Oral     Care team members:  Mobile - Primary OB     OBJECTIVE:   /84 (BP Location: Left arm, Patient Position: Sitting)   Wt 131.8 kg (290 lb 9.1 oz)   LMP 10/19/2022 (Approximate)   BMI 45.51 kg/m²     Physical Exam  Deferred    Ultrasound performed. See viewpoint for full ultrasound report.  [place] live IUP  The BPP score is reassuring at 8/8, and the MVP is normal.   Presentation is cephalic     Significant labs/imagin/16 - CBC/CMP/PCR WNL          ASSESSMENT/PLAN:     33 y.o.  female with IUP at 34w4d    Gestational diabetes mellitus (GDM) affecting pregnancy  Prior questionable DM dx vs pre-diabetes (A1c 5.8% on metformin prior to P1), managed with insulin  Negative 2hGTT postpartum  Positive [employer] screen this pregnancy    Current regimen:  - Lantus 11 u PM    Blood sugar review  Fastings for the most part in the high 90s to low 100s  Postprandials at BF controlled. Lunch and dinner above threshold.    Recommendations:  1. Consider increasing to  Lantus to 13u Qhs. Given proximity to delivery, would not recommend adding a second insulin dosing.  2. Fasting and 2 hour postprandial blood sugars three times daily   If >200, patient to call back to determine optimal treatment.  3. Continue twice weekly PNT  4.. Delivery timing per [place] guidelines  5. Recommend postpartum screening for underlying glucose intolerance. This can be done at either 6 weeks postpartum -or- on postpartum day 2 while still inpatient.  6. Diabetes screening with primary care provider every 1-3 years    Chronic hypertension with superimposed pre-eclampsia  PreE with severe features in G1 requiring 36w delivery  Not taking ASA  Pre-pregnancy regimen: No meds  Current regimen:  No meds1  Baseline preE labs: - Cr 0.8 - AST/ALT     Patient had severe range blood pressures in the JENNI a few weeks prior which required Procardia 10/20, and labetalol . On HD#2, patient with multiple severely elevated BP; however, following cuff change, Bps normalized with no medications. Throughout the PM, patient had one non-sustained severely elevated BP. Otherwise,stable. Labs were all normal on repeat assessment and protein was to low to quantitate (even on most recent labs). She was given a presumptive diagnosis of CHTN with DIANE with SF but declined continued inpatient care. She left the hospital on  and denies any problems since then.  She denies any preeclampsia symptoms.  She is reporting her blood pressures at home are all in the normal range in less than 140/90.      BP WNL today.     Recommendations:  1. Continue twice weekly  testing and labs  2. Continue close observation of blood pressure.    Per patient, plan to delivery on Tuesday given full clinical picture. Patient amenable to plan. IOL already scheduled.      Patient was counseled that prenatal ultrasound studies have limitations. They do not detect all fetal, genetic, placental, and maternal abnormalities. A normal  appearing prenatal ultrasound is reassuring. However, it does not guarantee the absence of an abnormality or predict a normal outcome for the fetus or the mother.       FOLLOW UP: No further ultrasounds or visits were scheduled given proximity to delivery.      The patient was given an opportunity to ask questions about the management of her high risk pregnancy problems. She expressed an understanding of and agreement to the above impression and plan. All questions were answered to her satisfaction.        Montez Kim MD   Maternal-Fetal Medicine      Electronically Signed by Montez Kim June 23, 2023

## 2023-06-25 ENCOUNTER — PATIENT MESSAGE (OUTPATIENT)
Dept: OBSTETRICS AND GYNECOLOGY | Facility: OTHER | Age: 33
End: 2023-06-25
Payer: COMMERCIAL

## 2023-06-26 ENCOUNTER — PATIENT MESSAGE (OUTPATIENT)
Dept: OTHER | Facility: OTHER | Age: 33
End: 2023-06-26
Payer: COMMERCIAL

## 2023-06-26 ENCOUNTER — ANESTHESIA (OUTPATIENT)
Dept: OBSTETRICS AND GYNECOLOGY | Facility: OTHER | Age: 33
End: 2023-06-26
Payer: COMMERCIAL

## 2023-06-26 ENCOUNTER — ANESTHESIA EVENT (OUTPATIENT)
Dept: OBSTETRICS AND GYNECOLOGY | Facility: OTHER | Age: 33
End: 2023-06-26
Payer: COMMERCIAL

## 2023-06-26 ENCOUNTER — HOSPITAL ENCOUNTER (INPATIENT)
Facility: OTHER | Age: 33
LOS: 3 days | Discharge: HOME | End: 2023-06-29
Attending: STUDENT IN AN ORGANIZED HEALTH CARE EDUCATION/TRAINING PROGRAM | Admitting: STUDENT IN AN ORGANIZED HEALTH CARE EDUCATION/TRAINING PROGRAM
Payer: COMMERCIAL

## 2023-06-26 DIAGNOSIS — O11.9 CHRONIC HYPERTENSION WITH SUPERIMPOSED PRE-ECLAMPSIA: ICD-10-CM

## 2023-06-26 DIAGNOSIS — O24.419 GESTATIONAL DIABETES MELLITUS (GDM) AFFECTING PREGNANCY: ICD-10-CM

## 2023-06-26 LAB
ABO + RH BLD: NORMAL
ALBUMIN SERPL BCP-MCNC: 3.2 G/DL (ref 3.5–5.2)
ALP SERPL-CCNC: 121 U/L (ref 55–135)
ALT SERPL W/O P-5'-P-CCNC: 15 U/L (ref 10–44)
ANION GAP SERPL CALC-SCNC: 11 MMOL/L (ref 8–16)
AST SERPL-CCNC: 17 U/L (ref 10–40)
BASOPHILS # BLD AUTO: 0.03 K/UL (ref 0–0.2)
BASOPHILS NFR BLD: 0.3 % (ref 0–1.9)
BILIRUB SERPL-MCNC: 0.3 MG/DL (ref 0.1–1)
BLD GP AB SCN CELLS X3 SERPL QL: NORMAL
BUN SERPL-MCNC: 9 MG/DL (ref 6–20)
CALCIUM SERPL-MCNC: 9.4 MG/DL (ref 8.7–10.5)
CHLORIDE SERPL-SCNC: 107 MMOL/L (ref 95–110)
CO2 SERPL-SCNC: 18 MMOL/L (ref 23–29)
CREAT SERPL-MCNC: 0.7 MG/DL (ref 0.5–1.4)
CREAT UR-MCNC: 34.9 MG/DL (ref 15–325)
DIFFERENTIAL METHOD: ABNORMAL
EOSINOPHIL # BLD AUTO: 0.1 K/UL (ref 0–0.5)
EOSINOPHIL NFR BLD: 0.9 % (ref 0–8)
ERYTHROCYTE [DISTWIDTH] IN BLOOD BY AUTOMATED COUNT: 16.4 % (ref 11.5–14.5)
EST. GFR  (NO RACE VARIABLE): >60 ML/MIN/1.73 M^2
GLUCOSE SERPL-MCNC: 150 MG/DL (ref 70–110)
HCO3 UR-SCNC: 22.5 MMOL/L (ref 24–28)
HCO3 UR-SCNC: 26.2 MMOL/L (ref 24–28)
HCT VFR BLD AUTO: 35 % (ref 37–48.5)
HGB BLD-MCNC: 12 G/DL (ref 12–16)
IMM GRANULOCYTES # BLD AUTO: 0.06 K/UL (ref 0–0.04)
IMM GRANULOCYTES NFR BLD AUTO: 0.6 % (ref 0–0.5)
LYMPHOCYTES # BLD AUTO: 2.8 K/UL (ref 1–4.8)
LYMPHOCYTES NFR BLD: 27.1 % (ref 18–48)
MCH RBC QN AUTO: 30.7 PG (ref 27–31)
MCHC RBC AUTO-ENTMCNC: 34.3 G/DL (ref 32–36)
MCV RBC AUTO: 90 FL (ref 82–98)
MONOCYTES # BLD AUTO: 0.7 K/UL (ref 0.3–1)
MONOCYTES NFR BLD: 6.6 % (ref 4–15)
NEUTROPHILS # BLD AUTO: 6.8 K/UL (ref 1.8–7.7)
NEUTROPHILS NFR BLD: 64.5 % (ref 38–73)
NRBC BLD-RTO: 0 /100 WBC
PCO2 BLDA: 49.9 MMHG (ref 35–45)
PCO2 BLDA: 72.5 MMHG (ref 35–45)
PH SMN: 7.17 [PH] (ref 7.35–7.45)
PH SMN: 7.26 [PH] (ref 7.35–7.45)
PLATELET # BLD AUTO: 240 K/UL (ref 150–450)
PMV BLD AUTO: 11.2 FL (ref 9.2–12.9)
PO2 BLDA: 31 MMHG (ref 80–100)
PO2 BLDA: 8 MMHG (ref 80–100)
POC BE: -2 MMOL/L
POC BE: -5 MMOL/L
POC SATURATED O2: 5 % (ref 95–100)
POC SATURATED O2: 50 % (ref 95–100)
POC TCO2: 24 MMOL/L (ref 23–27)
POC TCO2: 28 MMOL/L (ref 23–27)
POCT GLUCOSE: 100 MG/DL (ref 70–110)
POCT GLUCOSE: 100 MG/DL (ref 70–110)
POCT GLUCOSE: 103 MG/DL (ref 70–110)
POCT GLUCOSE: 135 MG/DL (ref 70–110)
POCT GLUCOSE: 140 MG/DL (ref 70–110)
POCT GLUCOSE: 89 MG/DL (ref 70–110)
POTASSIUM SERPL-SCNC: 4 MMOL/L (ref 3.5–5.1)
PROT SERPL-MCNC: 7.1 G/DL (ref 6–8.4)
PROT UR-MCNC: <7 MG/DL (ref 0–15)
PROT/CREAT UR: NORMAL MG/G{CREAT} (ref 0–0.2)
RBC # BLD AUTO: 3.91 M/UL (ref 4–5.4)
SAMPLE: ABNORMAL
SAMPLE: ABNORMAL
SODIUM SERPL-SCNC: 136 MMOL/L (ref 136–145)
SPECIMEN OUTDATE: NORMAL
WBC # BLD AUTO: 10.45 K/UL (ref 3.9–12.7)

## 2023-06-26 PROCEDURE — 59514 PRA REAN DELIVERY ONLY: ICD-10-PCS | Mod: AA,,, | Performed by: ANESTHESIOLOGY

## 2023-06-26 PROCEDURE — 86900 BLOOD TYPING SEROLOGIC ABO: CPT | Performed by: STUDENT IN AN ORGANIZED HEALTH CARE EDUCATION/TRAINING PROGRAM

## 2023-06-26 PROCEDURE — 72100003 HC LABOR CARE, EA. ADDL. 8 HRS

## 2023-06-26 PROCEDURE — 63600175 PHARM REV CODE 636 W HCPCS: Performed by: STUDENT IN AN ORGANIZED HEALTH CARE EDUCATION/TRAINING PROGRAM

## 2023-06-26 PROCEDURE — 59510 PR FULL ROUT OBSTE CARE,CESAREAN DELIV: ICD-10-PCS | Mod: AT,,, | Performed by: STUDENT IN AN ORGANIZED HEALTH CARE EDUCATION/TRAINING PROGRAM

## 2023-06-26 PROCEDURE — 99900035 HC TECH TIME PER 15 MIN (STAT)

## 2023-06-26 PROCEDURE — 36004724 HC OB OR TIME LEV III - 1ST 15 MIN: Performed by: STUDENT IN AN ORGANIZED HEALTH CARE EDUCATION/TRAINING PROGRAM

## 2023-06-26 PROCEDURE — 37000009 HC ANESTHESIA EA ADD 15 MINS: Performed by: STUDENT IN AN ORGANIZED HEALTH CARE EDUCATION/TRAINING PROGRAM

## 2023-06-26 PROCEDURE — 25000003 PHARM REV CODE 250: Performed by: STUDENT IN AN ORGANIZED HEALTH CARE EDUCATION/TRAINING PROGRAM

## 2023-06-26 PROCEDURE — 37000008 HC ANESTHESIA 1ST 15 MINUTES: Performed by: STUDENT IN AN ORGANIZED HEALTH CARE EDUCATION/TRAINING PROGRAM

## 2023-06-26 PROCEDURE — 25000003 PHARM REV CODE 250

## 2023-06-26 PROCEDURE — 11000001 HC ACUTE MED/SURG PRIVATE ROOM

## 2023-06-26 PROCEDURE — 59200 INSERT CERVICAL DILATOR: CPT

## 2023-06-26 PROCEDURE — 85025 COMPLETE CBC W/AUTO DIFF WBC: CPT | Performed by: STUDENT IN AN ORGANIZED HEALTH CARE EDUCATION/TRAINING PROGRAM

## 2023-06-26 PROCEDURE — 71000033 HC RECOVERY, INTIAL HOUR: Performed by: STUDENT IN AN ORGANIZED HEALTH CARE EDUCATION/TRAINING PROGRAM

## 2023-06-26 PROCEDURE — 63600175 PHARM REV CODE 636 W HCPCS

## 2023-06-26 PROCEDURE — 82803 BLOOD GASES ANY COMBINATION: CPT

## 2023-06-26 PROCEDURE — 84156 ASSAY OF PROTEIN URINE: CPT | Performed by: STUDENT IN AN ORGANIZED HEALTH CARE EDUCATION/TRAINING PROGRAM

## 2023-06-26 PROCEDURE — 71000039 HC RECOVERY, EACH ADD'L HOUR: Performed by: STUDENT IN AN ORGANIZED HEALTH CARE EDUCATION/TRAINING PROGRAM

## 2023-06-26 PROCEDURE — 72100002 HC LABOR CARE, 1ST 8 HOURS

## 2023-06-26 PROCEDURE — 36004725 HC OB OR TIME LEV III - EA ADD 15 MIN: Performed by: STUDENT IN AN ORGANIZED HEALTH CARE EDUCATION/TRAINING PROGRAM

## 2023-06-26 PROCEDURE — 59510 CESAREAN DELIVERY: CPT | Mod: AT,,, | Performed by: STUDENT IN AN ORGANIZED HEALTH CARE EDUCATION/TRAINING PROGRAM

## 2023-06-26 PROCEDURE — 80053 COMPREHEN METABOLIC PANEL: CPT | Performed by: STUDENT IN AN ORGANIZED HEALTH CARE EDUCATION/TRAINING PROGRAM

## 2023-06-26 PROCEDURE — 59514 CESAREAN DELIVERY ONLY: CPT | Mod: AA,,, | Performed by: ANESTHESIOLOGY

## 2023-06-26 RX ORDER — MAGNESIUM SULFATE HEPTAHYDRATE 40 MG/ML
4 INJECTION, SOLUTION INTRAVENOUS ONCE
Status: COMPLETED | OUTPATIENT
Start: 2023-06-26 | End: 2023-06-26

## 2023-06-26 RX ORDER — LIDOCAINE HYDROCHLORIDE 10 MG/ML
10 INJECTION INFILTRATION; PERINEURAL ONCE AS NEEDED
Status: DISCONTINUED | OUTPATIENT
Start: 2023-06-26 | End: 2023-06-29 | Stop reason: HOSPADM

## 2023-06-26 RX ORDER — TRANEXAMIC ACID 10 MG/ML
1000 INJECTION, SOLUTION INTRAVENOUS ONCE AS NEEDED
Status: DISCONTINUED | OUTPATIENT
Start: 2023-06-26 | End: 2023-06-29 | Stop reason: HOSPADM

## 2023-06-26 RX ORDER — MISOPROSTOL 200 UG/1
800 TABLET ORAL ONCE AS NEEDED
Status: DISCONTINUED | OUTPATIENT
Start: 2023-06-26 | End: 2023-06-29 | Stop reason: HOSPADM

## 2023-06-26 RX ORDER — ACETAMINOPHEN 325 MG/1
650 TABLET ORAL EVERY 6 HOURS
Status: DISPENSED | OUTPATIENT
Start: 2023-06-27 | End: 2023-06-28

## 2023-06-26 RX ORDER — OXYCODONE AND ACETAMINOPHEN 10; 325 MG/1; MG/1
1 TABLET ORAL EVERY 4 HOURS PRN
Status: DISCONTINUED | OUTPATIENT
Start: 2023-06-26 | End: 2023-06-27

## 2023-06-26 RX ORDER — INSULIN ASPART 100 [IU]/ML
0-5 INJECTION, SOLUTION INTRAVENOUS; SUBCUTANEOUS EVERY 6 HOURS PRN
Status: DISCONTINUED | OUTPATIENT
Start: 2023-06-26 | End: 2023-06-26

## 2023-06-26 RX ORDER — OXYTOCIN/RINGER'S LACTATE 30/500 ML
95 PLASTIC BAG, INJECTION (ML) INTRAVENOUS ONCE AS NEEDED
Status: DISCONTINUED | OUTPATIENT
Start: 2023-06-26 | End: 2023-06-29 | Stop reason: HOSPADM

## 2023-06-26 RX ORDER — METHYLERGONOVINE MALEATE 0.2 MG/ML
200 INJECTION INTRAVENOUS
Status: DISCONTINUED | OUTPATIENT
Start: 2023-06-26 | End: 2023-06-29 | Stop reason: HOSPADM

## 2023-06-26 RX ORDER — OXYTOCIN 10 [USP'U]/ML
INJECTION, SOLUTION INTRAMUSCULAR; INTRAVENOUS
Status: DISCONTINUED | OUTPATIENT
Start: 2023-06-26 | End: 2023-06-26

## 2023-06-26 RX ORDER — OXYTOCIN 10 [USP'U]/ML
10 INJECTION, SOLUTION INTRAMUSCULAR; INTRAVENOUS ONCE AS NEEDED
Status: DISCONTINUED | OUTPATIENT
Start: 2023-06-26 | End: 2023-06-29 | Stop reason: HOSPADM

## 2023-06-26 RX ORDER — CALCIUM GLUCONATE 98 MG/ML
1 INJECTION, SOLUTION INTRAVENOUS
Status: DISCONTINUED | OUTPATIENT
Start: 2023-06-26 | End: 2023-06-29 | Stop reason: HOSPADM

## 2023-06-26 RX ORDER — SODIUM CHLORIDE, SODIUM LACTATE, POTASSIUM CHLORIDE, CALCIUM CHLORIDE 600; 310; 30; 20 MG/100ML; MG/100ML; MG/100ML; MG/100ML
INJECTION, SOLUTION INTRAVENOUS CONTINUOUS
Status: DISCONTINUED | OUTPATIENT
Start: 2023-06-26 | End: 2023-06-27

## 2023-06-26 RX ORDER — CHLORHEXIDINE GLUCONATE ORAL RINSE 1.2 MG/ML
10 SOLUTION DENTAL 2 TIMES DAILY
Status: DISCONTINUED | OUTPATIENT
Start: 2023-06-26 | End: 2023-06-29 | Stop reason: HOSPADM

## 2023-06-26 RX ORDER — SODIUM CHLORIDE 0.9 % (FLUSH) 0.9 %
10 SYRINGE (ML) INJECTION
Status: DISCONTINUED | OUTPATIENT
Start: 2023-06-26 | End: 2023-06-29 | Stop reason: HOSPADM

## 2023-06-26 RX ORDER — ACETAMINOPHEN 10 MG/ML
INJECTION, SOLUTION INTRAVENOUS
Status: DISCONTINUED | OUTPATIENT
Start: 2023-06-26 | End: 2023-06-26

## 2023-06-26 RX ORDER — OXYTOCIN/RINGER'S LACTATE 30/500 ML
95 PLASTIC BAG, INJECTION (ML) INTRAVENOUS ONCE AS NEEDED
Status: COMPLETED | OUTPATIENT
Start: 2023-06-26 | End: 2023-06-26

## 2023-06-26 RX ORDER — KETOROLAC TROMETHAMINE 30 MG/ML
30 INJECTION, SOLUTION INTRAMUSCULAR; INTRAVENOUS EVERY 6 HOURS
Status: DISCONTINUED | OUTPATIENT
Start: 2023-06-27 | End: 2023-06-27

## 2023-06-26 RX ORDER — GLUCAGON 1 MG
1 KIT INJECTION
Status: DISCONTINUED | OUTPATIENT
Start: 2023-06-26 | End: 2023-06-26

## 2023-06-26 RX ORDER — CARBOPROST TROMETHAMINE 250 UG/ML
250 INJECTION, SOLUTION INTRAMUSCULAR
Status: DISCONTINUED | OUTPATIENT
Start: 2023-06-26 | End: 2023-06-29 | Stop reason: HOSPADM

## 2023-06-26 RX ORDER — KETAMINE HYDROCHLORIDE 50 MG/ML
INJECTION, SOLUTION INTRAMUSCULAR; INTRAVENOUS
Status: DISCONTINUED | OUTPATIENT
Start: 2023-06-26 | End: 2023-06-26

## 2023-06-26 RX ORDER — OXYTOCIN/RINGER'S LACTATE 30/500 ML
334 PLASTIC BAG, INJECTION (ML) INTRAVENOUS ONCE AS NEEDED
Status: DISCONTINUED | OUTPATIENT
Start: 2023-06-26 | End: 2023-06-29 | Stop reason: HOSPADM

## 2023-06-26 RX ORDER — HYDROMORPHONE HYDROCHLORIDE 1 MG/ML
INJECTION, SOLUTION INTRAMUSCULAR; INTRAVENOUS; SUBCUTANEOUS
Status: DISCONTINUED | OUTPATIENT
Start: 2023-06-26 | End: 2023-06-26

## 2023-06-26 RX ORDER — ENOXAPARIN SODIUM 100 MG/ML
40 INJECTION SUBCUTANEOUS EVERY 12 HOURS
Status: DISCONTINUED | OUTPATIENT
Start: 2023-06-27 | End: 2023-06-29 | Stop reason: HOSPADM

## 2023-06-26 RX ORDER — MISOPROSTOL 200 UG/1
800 TABLET ORAL
Status: DISCONTINUED | OUTPATIENT
Start: 2023-06-26 | End: 2023-06-29 | Stop reason: HOSPADM

## 2023-06-26 RX ORDER — PROPOFOL 10 MG/ML
VIAL (ML) INTRAVENOUS
Status: DISCONTINUED | OUTPATIENT
Start: 2023-06-26 | End: 2023-06-26

## 2023-06-26 RX ORDER — HYDROCORTISONE 25 MG/G
CREAM TOPICAL 3 TIMES DAILY PRN
Status: DISCONTINUED | OUTPATIENT
Start: 2023-06-26 | End: 2023-06-29 | Stop reason: HOSPADM

## 2023-06-26 RX ORDER — MIDAZOLAM HYDROCHLORIDE 1 MG/ML
INJECTION INTRAMUSCULAR; INTRAVENOUS
Status: DISCONTINUED | OUTPATIENT
Start: 2023-06-26 | End: 2023-06-26

## 2023-06-26 RX ORDER — OXYTOCIN/RINGER'S LACTATE 30/500 ML
95 PLASTIC BAG, INJECTION (ML) INTRAVENOUS ONCE
Status: DISCONTINUED | OUTPATIENT
Start: 2023-06-26 | End: 2023-06-29 | Stop reason: HOSPADM

## 2023-06-26 RX ORDER — PRENATAL WITH FERROUS FUM AND FOLIC ACID 3080; 920; 120; 400; 22; 1.84; 3; 20; 10; 1; 12; 200; 27; 25; 2 [IU]/1; [IU]/1; MG/1; [IU]/1; MG/1; MG/1; MG/1; MG/1; MG/1; MG/1; UG/1; MG/1; MG/1; MG/1; MG/1
1 TABLET ORAL DAILY
Status: DISCONTINUED | OUTPATIENT
Start: 2023-06-27 | End: 2023-06-29 | Stop reason: HOSPADM

## 2023-06-26 RX ORDER — KETOROLAC TROMETHAMINE 30 MG/ML
INJECTION, SOLUTION INTRAMUSCULAR; INTRAVENOUS
Status: DISCONTINUED | OUTPATIENT
Start: 2023-06-26 | End: 2023-06-26

## 2023-06-26 RX ORDER — DEXAMETHASONE SODIUM PHOSPHATE 4 MG/ML
INJECTION, SOLUTION INTRA-ARTICULAR; INTRALESIONAL; INTRAMUSCULAR; INTRAVENOUS; SOFT TISSUE
Status: DISCONTINUED | OUTPATIENT
Start: 2023-06-26 | End: 2023-06-26

## 2023-06-26 RX ORDER — ONDANSETRON HYDROCHLORIDE 2 MG/ML
INJECTION, SOLUTION INTRAMUSCULAR; INTRAVENOUS
Status: DISCONTINUED | OUTPATIENT
Start: 2023-06-26 | End: 2023-06-26

## 2023-06-26 RX ORDER — FENTANYL CITRATE 50 UG/ML
INJECTION, SOLUTION INTRAMUSCULAR; INTRAVENOUS
Status: DISCONTINUED | OUTPATIENT
Start: 2023-06-26 | End: 2023-06-26

## 2023-06-26 RX ORDER — DIPHENHYDRAMINE HCL 25 MG
25 CAPSULE ORAL EVERY 4 HOURS PRN
Status: DISCONTINUED | OUTPATIENT
Start: 2023-06-26 | End: 2023-06-29 | Stop reason: HOSPADM

## 2023-06-26 RX ORDER — SODIUM CHLORIDE 9 MG/ML
INJECTION, SOLUTION INTRAVENOUS
Status: DISCONTINUED | OUTPATIENT
Start: 2023-06-26 | End: 2023-06-29 | Stop reason: HOSPADM

## 2023-06-26 RX ORDER — DOCUSATE SODIUM 100 MG/1
200 CAPSULE, LIQUID FILLED ORAL 2 TIMES DAILY
Status: DISCONTINUED | OUTPATIENT
Start: 2023-06-26 | End: 2023-06-29 | Stop reason: HOSPADM

## 2023-06-26 RX ORDER — NALOXONE HCL 0.4 MG/ML
0.02 VIAL (ML) INJECTION
Status: DISCONTINUED | OUTPATIENT
Start: 2023-06-26 | End: 2023-06-27

## 2023-06-26 RX ORDER — ONDANSETRON 8 MG/1
8 TABLET, ORALLY DISINTEGRATING ORAL EVERY 8 HOURS PRN
Status: DISCONTINUED | OUTPATIENT
Start: 2023-06-26 | End: 2023-06-26

## 2023-06-26 RX ORDER — PROCHLORPERAZINE EDISYLATE 5 MG/ML
5 INJECTION INTRAMUSCULAR; INTRAVENOUS EVERY 6 HOURS PRN
Status: DISCONTINUED | OUTPATIENT
Start: 2023-06-26 | End: 2023-06-26

## 2023-06-26 RX ORDER — BISACODYL 10 MG
10 SUPPOSITORY, RECTAL RECTAL ONCE AS NEEDED
Status: DISCONTINUED | OUTPATIENT
Start: 2023-06-26 | End: 2023-06-29 | Stop reason: HOSPADM

## 2023-06-26 RX ORDER — ONDANSETRON 2 MG/ML
4 INJECTION INTRAMUSCULAR; INTRAVENOUS EVERY 6 HOURS PRN
Status: ACTIVE | OUTPATIENT
Start: 2023-06-26 | End: 2023-06-27

## 2023-06-26 RX ORDER — OXYTOCIN/RINGER'S LACTATE 30/500 ML
0-30 PLASTIC BAG, INJECTION (ML) INTRAVENOUS CONTINUOUS
Status: DISCONTINUED | OUTPATIENT
Start: 2023-06-26 | End: 2023-06-27

## 2023-06-26 RX ORDER — AMOXICILLIN 250 MG
1 CAPSULE ORAL NIGHTLY PRN
Status: DISCONTINUED | OUTPATIENT
Start: 2023-06-26 | End: 2023-06-29 | Stop reason: HOSPADM

## 2023-06-26 RX ORDER — HYDROMORPHONE HYDROCHLORIDE 1 MG/ML
INJECTION, SOLUTION INTRAMUSCULAR; INTRAVENOUS; SUBCUTANEOUS
Status: DISPENSED
Start: 2023-06-26 | End: 2023-06-27

## 2023-06-26 RX ORDER — SIMETHICONE 80 MG
1 TABLET,CHEWABLE ORAL 4 TIMES DAILY PRN
Status: DISCONTINUED | OUTPATIENT
Start: 2023-06-26 | End: 2023-06-26

## 2023-06-26 RX ORDER — SUCCINYLCHOLINE CHLORIDE 20 MG/ML
INJECTION INTRAMUSCULAR; INTRAVENOUS
Status: DISCONTINUED | OUTPATIENT
Start: 2023-06-26 | End: 2023-06-26

## 2023-06-26 RX ORDER — LABETALOL HYDROCHLORIDE 5 MG/ML
20 INJECTION, SOLUTION INTRAVENOUS ONCE
Status: COMPLETED | OUTPATIENT
Start: 2023-06-26 | End: 2023-06-26

## 2023-06-26 RX ORDER — SIMETHICONE 80 MG
1 TABLET,CHEWABLE ORAL EVERY 6 HOURS PRN
Status: DISCONTINUED | OUTPATIENT
Start: 2023-06-26 | End: 2023-06-29 | Stop reason: HOSPADM

## 2023-06-26 RX ORDER — ADHESIVE BANDAGE
30 BANDAGE TOPICAL 2 TIMES DAILY PRN
Status: DISCONTINUED | OUTPATIENT
Start: 2023-06-27 | End: 2023-06-29 | Stop reason: HOSPADM

## 2023-06-26 RX ORDER — OXYTOCIN/RINGER'S LACTATE 30/500 ML
334 PLASTIC BAG, INJECTION (ML) INTRAVENOUS ONCE
Status: DISCONTINUED | OUTPATIENT
Start: 2023-06-26 | End: 2023-06-29 | Stop reason: HOSPADM

## 2023-06-26 RX ORDER — IBUPROFEN 600 MG/1
600 TABLET ORAL EVERY 6 HOURS
Status: DISCONTINUED | OUTPATIENT
Start: 2023-06-27 | End: 2023-06-29 | Stop reason: HOSPADM

## 2023-06-26 RX ORDER — MAGNESIUM SULFATE HEPTAHYDRATE 40 MG/ML
2 INJECTION, SOLUTION INTRAVENOUS CONTINUOUS
Status: DISCONTINUED | OUTPATIENT
Start: 2023-06-26 | End: 2023-06-27

## 2023-06-26 RX ORDER — PROCHLORPERAZINE EDISYLATE 5 MG/ML
5 INJECTION INTRAMUSCULAR; INTRAVENOUS EVERY 6 HOURS PRN
Status: DISCONTINUED | OUTPATIENT
Start: 2023-06-26 | End: 2023-06-29 | Stop reason: HOSPADM

## 2023-06-26 RX ORDER — DIPHENOXYLATE HYDROCHLORIDE AND ATROPINE SULFATE 2.5; .025 MG/1; MG/1
1 TABLET ORAL 4 TIMES DAILY PRN
Status: DISCONTINUED | OUTPATIENT
Start: 2023-06-26 | End: 2023-06-29 | Stop reason: HOSPADM

## 2023-06-26 RX ORDER — HYDROMORPHONE HCL IN 0.9% NACL 6 MG/30 ML
PATIENT CONTROLLED ANALGESIA SYRINGE INTRAVENOUS CONTINUOUS
Status: DISCONTINUED | OUTPATIENT
Start: 2023-06-26 | End: 2023-06-27

## 2023-06-26 RX ORDER — CALCIUM CARBONATE 200(500)MG
500 TABLET,CHEWABLE ORAL 3 TIMES DAILY PRN
Status: DISCONTINUED | OUTPATIENT
Start: 2023-06-26 | End: 2023-06-29 | Stop reason: HOSPADM

## 2023-06-26 RX ORDER — OXYCODONE AND ACETAMINOPHEN 5; 325 MG/1; MG/1
1 TABLET ORAL EVERY 4 HOURS PRN
Status: DISCONTINUED | OUTPATIENT
Start: 2023-06-26 | End: 2023-06-27

## 2023-06-26 RX ORDER — ONDANSETRON 8 MG/1
8 TABLET, ORALLY DISINTEGRATING ORAL EVERY 8 HOURS PRN
Status: DISCONTINUED | OUTPATIENT
Start: 2023-06-26 | End: 2023-06-29 | Stop reason: HOSPADM

## 2023-06-26 RX ADMIN — MAGNESIUM SULFATE HEPTAHYDRATE 4 G: 40 INJECTION, SOLUTION INTRAVENOUS at 08:06

## 2023-06-26 RX ADMIN — MISOPROSTOL 50 MCG: 100 TABLET ORAL at 05:06

## 2023-06-26 RX ADMIN — LABETALOL HYDROCHLORIDE 20 MG: 5 INJECTION INTRAVENOUS at 07:06

## 2023-06-26 RX ADMIN — OXYTOCIN 5 UNITS: 10 INJECTION, SOLUTION INTRAMUSCULAR; INTRAVENOUS at 06:06

## 2023-06-26 RX ADMIN — INSULIN ASPART 1 UNITS: 100 INJECTION, SOLUTION INTRAVENOUS; SUBCUTANEOUS at 01:06

## 2023-06-26 RX ADMIN — FENTANYL CITRATE 100 MCG: 50 INJECTION INTRAMUSCULAR; INTRAVENOUS at 06:06

## 2023-06-26 RX ADMIN — SIMETHICONE 80 MG: 80 TABLET, CHEWABLE ORAL at 11:06

## 2023-06-26 RX ADMIN — MISOPROSTOL 50 MCG: 100 TABLET ORAL at 01:06

## 2023-06-26 RX ADMIN — INSULIN DETEMIR 5 UNITS: 100 INJECTION, SOLUTION SUBCUTANEOUS at 03:06

## 2023-06-26 RX ADMIN — PROPOFOL 200 MG: 10 INJECTION, EMULSION INTRAVENOUS at 05:06

## 2023-06-26 RX ADMIN — ACETAMINOPHEN 1000 MG: 10 INJECTION INTRAVENOUS at 06:06

## 2023-06-26 RX ADMIN — SODIUM CHLORIDE 3 G: 9 INJECTION, SOLUTION INTRAVENOUS at 05:06

## 2023-06-26 RX ADMIN — Medication 2 MILLI-UNITS/MIN: at 10:06

## 2023-06-26 RX ADMIN — IBUPROFEN 600 MG: 600 TABLET ORAL at 11:06

## 2023-06-26 RX ADMIN — Medication 95 MILLI-UNITS/MIN: at 07:06

## 2023-06-26 RX ADMIN — KETOROLAC TROMETHAMINE 30 MG: 30 INJECTION, SOLUTION INTRAMUSCULAR; INTRAVENOUS at 06:06

## 2023-06-26 RX ADMIN — KETAMINE HYDROCHLORIDE 20 MG: 50 INJECTION INTRAMUSCULAR; INTRAVENOUS at 06:06

## 2023-06-26 RX ADMIN — SODIUM CHLORIDE, POTASSIUM CHLORIDE, SODIUM LACTATE AND CALCIUM CHLORIDE: 600; 310; 30; 20 INJECTION, SOLUTION INTRAVENOUS at 11:06

## 2023-06-26 RX ADMIN — KETAMINE HYDROCHLORIDE 30 MG: 50 INJECTION INTRAMUSCULAR; INTRAVENOUS at 06:06

## 2023-06-26 RX ADMIN — SODIUM CHLORIDE, POTASSIUM CHLORIDE, SODIUM LACTATE AND CALCIUM CHLORIDE: 600; 310; 30; 20 INJECTION, SOLUTION INTRAVENOUS at 10:06

## 2023-06-26 RX ADMIN — Medication: at 08:06

## 2023-06-26 RX ADMIN — MIDAZOLAM HYDROCHLORIDE 2 MG: 1 INJECTION, SOLUTION INTRAMUSCULAR; INTRAVENOUS at 05:06

## 2023-06-26 RX ADMIN — HYDROMORPHONE HYDROCHLORIDE 1 MG: 1 INJECTION, SOLUTION INTRAMUSCULAR; INTRAVENOUS; SUBCUTANEOUS at 06:06

## 2023-06-26 RX ADMIN — SUCCINYLCHOLINE CHLORIDE 200 MG: 20 INJECTION, SOLUTION INTRAMUSCULAR; INTRAVENOUS at 05:06

## 2023-06-26 RX ADMIN — FENTANYL CITRATE 100 MCG: 50 INJECTION INTRAMUSCULAR; INTRAVENOUS at 05:06

## 2023-06-26 RX ADMIN — DEXAMETHASONE SODIUM PHOSPHATE 4 MG: 4 INJECTION, SOLUTION INTRAMUSCULAR; INTRAVENOUS at 05:06

## 2023-06-26 RX ADMIN — ONDANSETRON 4 MG: 2 INJECTION INTRAMUSCULAR; INTRAVENOUS at 05:06

## 2023-06-26 RX ADMIN — CARBOPROST TROMETHAMINE 250 MCG: 250 INJECTION, SOLUTION INTRAMUSCULAR at 06:06

## 2023-06-26 NOTE — PROGRESS NOTES
LABOR NOTE    S:  Complaints: No.  Epidural working:  not applicable  Resident to bedside for routine cervical check     O: BP (!) 142/69   Pulse 95   Temp 98.4 °F (36.9 °C) (Oral)   Resp 16   LMP 10/19/2022 (Approximate)   SpO2 97%   Breastfeeding No     FHT: 120, mod BTBV, + accels, - decels, Cat 1 (reassuring)  CTX: none noted  SVE: 1/40/-4    TIMELINE:  0100: 1/50/-3  0145: cytotec 1 given  0500: 1/50/-3, godfrey balloon placed    PLAN:  Godfrey balloon placed without difficulty, patient agreeable to continuous monitoring during counseling before balloon insertion   Repeat cytotec at 0545  Continue Close Maternal/Fetal Monitoring  Pitocin Augmentation per protocol  Recheck 2-4 hours or PRN    cHTN w/ DIANE:   - BP: (142-178)/(69-92) 142/69   - Severe range BP resolved after repositioning of BP cuff  - Pre E labs wnl on admit  - Continue to monitor BPs    Rupinder Vincent MD  OB/GYN PGY-2

## 2023-06-26 NOTE — ANESTHESIA PREPROCEDURE EVALUATION
Marcie Cheek is a 33 y.o. female  at 35w0d presenting for induction of labor for cHTN SI on Pre-E w/ SF.     Pregnancy is complicated by g-DM, MO-BMI 46, and anxiety.     OB History    Para Term  AB Living   2 1   1   1   SAB IAB Ectopic Multiple Live Births         0 1      # Outcome Date GA Lbr Harrison/2nd Weight Sex Delivery Anes PTL Lv   2 Current            1  22 36w4d 31:20 / 01:16 2.79 kg (6 lb 2.4 oz) F Vag-Spont EPI Y TOM       Wt Readings from Last 1 Encounters:   23 1029 131.9 kg (290 lb 12.6 oz)       BP Readings from Last 3 Encounters:   23 132/80   23 122/84   23 122/84       Patient Active Problem List   Diagnosis    Foot dermatitis    Obesity affecting pregnancy in second trimester    Chronic hypertension affecting pregnancy    History of pre-eclampsia    Gestational diabetes mellitus (GDM) affecting pregnancy    Chronic hypertension with superimposed pre-eclampsia    Obesity    33 weeks gestation of pregnancy       Past Surgical History:   Procedure Laterality Date    HYSTEROSCOPY      WISDOM TOOTH EXTRACTION         Social History     Socioeconomic History    Marital status:    Tobacco Use    Smoking status: Never    Smokeless tobacco: Never   Substance and Sexual Activity    Alcohol use: Not Currently    Drug use: Never    Sexual activity: Yes     Partners: Male         Chemistry        Component Value Date/Time     2023 0028    K 4.0 2023 0028     2023 0028    CO2 18 (L) 2023 0028    BUN 9 2023 0028    CREATININE 0.7 2023 0028     (H) 2023 0028        Component Value Date/Time    CALCIUM 9.4 2023 0028    ALKPHOS 121 2023 0028    AST 17 2023 0028    ALT 15 2023 0028    BILITOT 0.3 2023 0028    ESTGFRAFRICA >60 2022 0252    EGFRNONAA >60 2022 0252            Lab Results   Component Value Date    WBC 10.45  06/26/2023    HGB 12.0 06/26/2023    HCT 35.0 (L) 06/26/2023    MCV 90 06/26/2023     06/26/2023       No results for input(s): PT, INR, PROTIME, APTT in the last 72 hours.                  Pre-op Assessment    I have reviewed the Patient Summary Reports.     I have reviewed the Nursing Notes.    I have reviewed the Medications.     Review of Systems  Anesthesia Hx:  No problems with previous Anesthesia  History of prior surgery of interest to airway management or planning: Denies Family Hx of Anesthesia complications.   Denies Personal Hx of Anesthesia complications.   Social:  Non-Smoker    Hematology/Oncology:     Oncology Normal     Cardiovascular:   Hypertension  Denies Angina. ECG has been reviewed.    Pulmonary:   Denies Shortness of breath.  Denies Recent URI.    Renal/:  Renal/ Normal     Hepatic/GI:   Denies GERD. Denies Liver Disease.    Neurological:   Denies CVA. Denies Seizures.    Endocrine:   Diabetes, gestational  Morbid Obesity / BMI > 40  Psych:   depression          Physical Exam  General: Well nourished, Cooperative and Alert    Airway:  Mallampati: II   Mouth Opening: Normal  TM Distance: Normal  Tongue: Normal  Neck ROM: Normal ROM    Dental:  Intact        Anesthesia Plan  Type of Anesthesia, risks & benefits discussed:    Anesthesia Type: Gen ETT, Epidural, Spinal, CSE  Intra-op Monitoring Plan: Standard ASA Monitors  Post Op Pain Control Plan: multimodal analgesia  Induction:  IV  Airway Plan: Direct, Post-Induction  Informed Consent: Informed consent signed with the Patient and all parties understand the risks and agree with anesthesia plan.  All questions answered.   ASA Score: 3  Day of Surgery Review of History & Physical: H&P Update referred to the surgeon/provider.    Ready For Surgery From Anesthesia Perspective.     .

## 2023-06-26 NOTE — H&P
"   HISTORY AND PHYSICAL                                                OBSTETRICS          Subjective:       Marcie Cheek is a 33 y.o.  female with IUP at 35w0d weeks gestation who presents with IOL.    Patient denies contractions, denies vaginal bleeding, denies LOF.   Fetal Movement: normal.    This IUP is complicated by chronic hypertension with superimposed pre-eclampsia, GDM, and MO (pre pregnancy BMI 42).    Review of Systems   Constitutional:  Negative for chills and fatigue.   Eyes:  Negative for visual disturbance.   Respiratory:  Negative for cough and shortness of breath.    Cardiovascular:  Negative for chest pain.   Gastrointestinal:  Negative for abdominal pain, nausea and vomiting.   Endocrine: Negative for diabetes.   Genitourinary:  Negative for dysuria and vaginal bleeding.   Musculoskeletal:  Negative for back pain.   Neurological:  Negative for headaches.     PMHx:   Past Medical History:   Diagnosis Date    Anxiety     Depression     Diabetes mellitus affecting pregnancy in third trimester 3/14/2022    Hypertension        PSHx:   Past Surgical History:   Procedure Laterality Date    HYSTEROSCOPY      WISDOM TOOTH EXTRACTION         All: Review of patient's allergies indicates:  No Known Allergies    Meds:   Medications Prior to Admission   Medication Sig Dispense Refill Last Dose    insulin (LANTUS SOLOSTAR U-100 INSULIN) glargine 100 units/mL SubQ pen Inject 10 Units into the skin every evening. 3 mL 1     pen needle, diabetic 31 gauge x 5/16" Ndle 1 Units by Misc.(Non-Drug; Combo Route) route every evening. 90 each 0     PNV no.1/iron,carb/docus/folic (PREN VIT COMB.1-IRON CB-FA-DSS ORAL) Take by mouth.          SH:   Social History     Socioeconomic History    Marital status:    Tobacco Use    Smoking status: Never    Smokeless tobacco: Never   Substance and Sexual Activity    Alcohol use: Not Currently    Drug use: Never    Sexual activity: Yes     Partners: Male "       FH:   Family History   Problem Relation Age of Onset    Thyroid disease Maternal Grandmother     Breast cancer Neg Hx     Colon cancer Neg Hx     Ovarian cancer Neg Hx        OBHx:   OB History    Para Term  AB Living   2 1 0 1 0 1   SAB IAB Ectopic Multiple Live Births   0 0 0 0 1      # Outcome Date GA Lbr Harrison/2nd Weight Sex Delivery Anes PTL Lv   2 Current            1  22 36w4d 31:20 / 01:16 2.79 kg (6 lb 2.4 oz) F Vag-Spont EPI Y TOM      Name: SANJANA,GIRL LAN      Apgar1: 4  Apgar5: 6       Objective:       BP (!) 142/69   Pulse 95   Temp 98.4 °F (36.9 °C) (Oral)   Resp 16   LMP 10/19/2022 (Approximate)   SpO2 97%   Breastfeeding No     Vitals:    23 0117 23 0118 23 0122 23 0141   BP:    (!) 142/69   Pulse: 97 91 99 95   Resp:       Temp:       TempSrc:       SpO2: 98%  97%        General: NAD, alert, oriented, cooperative  HEENT: NCAT, EOM grossly intact  Lungs: Normal WOB  Heart: regular rate  Abdomen: gravid, soft, nondistended, nontender, no rebound or guarding  Extremities: mild edema    FHT: 145 bpm, moderate BTBV, +accels, -decels; Cat 1 (reassuring)  [place]: q 0mins  Presentation: cephalic by ultrasound    Cervix: 1/50/-3    EFW by Leopold's: 7lbs    Maternal pelvis proven to 6lbs 2oz    Recent Growth Scan: 2511g (EFW 62%, AC >99%) @33w3d    Lab Review  Blood Type O POS  GBBS: negative  Rubella: Not immune  RPR: nr  HIV: negative  HepB: negative       Assessment:       35w0d weeks gestation with IOL         Plan:     IOL   Risks, benefits, alternatives and possible complications have been discussed in detail with the patient.   - Consents signed and to chart  - Admit to Labor and Delivery unit  - Induction agent: cytotec, consider cervical godfrey placement at next check  - Epidural per Anesthesia  - Draw CBC, T&S  - Notify Staff  - Recheck in 4 hrs or PRN  - Post-Partum Hemorrhage risk - low  - Postpartum lovenox: BID dosing due to  BMI    2. cHTN w/ DIANE  - BP: 142/69  - Pre E labs collected on admit  - Asymptomatic  - Per Dr Evans's recommendations: At this point, it remains unclear if she truly has superimposed preeclampsia versus worsening chronic hypertension.  If all of her labs and testing remained normal, it would be reasonable to allow the pregnancy to progress to 37 weeks, but would have a very low threshold given her history for moving to delivery sooner.   - Due to full clinical picture of questionable super imposed pre-E w/ severe features vs worsening chronic hypertension combined with GDM requiring increasing insulin requirements and evolving fetal macrosomia, plan for IOL today at 35 weeks gestation per [facility] recommendations    3. Gestational diabetes  - Regimen: lantus 11u HS  - Sliding scale insulin while in labor  - POCT BG q4 latent labor, q2 active labor    4. MO  - Pre pregnancy BMI 42  - Lovenox BID for VTE ppx postpartum      Hanane Muse MD PGY-4  Obstetrics and Gynecology        Hanane Muse MD PGY-4  Obstetrics and Gynecology

## 2023-06-26 NOTE — NURSING
1745 - AROM clear fluid  1746 - MD asked to call for additional staff. Charge RN and upper level OB resident called to bedside  1747 - MD stated prolapsed cord. Anesthesia and additional staff called.   1748 - moved to OR for C/S  1749- NICU CN called

## 2023-06-26 NOTE — ASSESSMENT & PLAN NOTE
PreE with severe features in G1 requiring 36w delivery  Not taking ASA  Pre-pregnancy regimen: No meds  Current regimen:  No meds1  Baseline preE labs: - Cr 0.8 - AST/ALT     Patient had severe range blood pressures in the JENNI a few weeks prior which required Procardia 10/20, and labetalol . On HD#2, patient with multiple severely elevated BP; however, following cuff change, Bps normalized with no medications. Throughout the PM, patient had one non-sustained severely elevated BP. Otherwise,stable. Labs were all normal on repeat assessment and protein was to low to quantitate (even on most recent labs). She was given a presumptive diagnosis of CHTN with DIANE with SF but declined continued inpatient care. She left the hospital on  and denies any problems since then.  She denies any preeclampsia symptoms.  She is reporting her blood pressures at home are all in the normal range in less than 140/90.      BP WNL today.     Recommendations:  1. Continue twice weekly  testing and labs  2. Continue close observation of blood pressure.    Per patient, plan to delivery on Tuesday given full clinical picture. Patient amenable to plan. IOL already scheduled.

## 2023-06-26 NOTE — PROGRESS NOTES
LABOR NOTE    S:  Complaints: No.  Epidural working:  not applicable  Resident to bedside for routine cervical check     O: /65   Pulse 94   Temp 98.6 °F (37 °C) (Oral)   Resp 18   LMP 10/19/2022 (Approximate)   SpO2 99%   Breastfeeding No     FHT: 130bpm, mod BTBV, + accels, - decels, Cat 1 (reassuring)  CTX: none noted  SVE: 3/60/-3, godfrey out    TIMELINE:  0100: /-3  0145: cytotec 1 given  0500: /-3, godfrey balloon placed  0545: cytotec #2  1000: 3/60/3, godfrey out, will start pit    PLAN:    Extensive discussion on rounds regarding patient's diagnosis. Reviewed that indication for induction is chronic hypertension with superimposed pre-eclampsia with severe features (BP). Although this diagnosis has been questioned at times, it is the reason for pre-term induction. Since arrival on L&D, BP has been well-controlled, with exception of few severe range BP which were thought to be inaccurate due to cuff sizing. Patient remains asymptomatic. Discussed entire clinical situation with staff and MFM on rounds. As patient has cHTN w/DIANE w/SF as indication for IOL, would recommend magnesium sulfate for seizure prophylaxis.     Reviewed this recommendation with patient. She declines magnesium sulfate at this time. Reviewed risks of not receiving magnesium sulfate, including, but not limited to, seizure, maternal or fetal death, and stroke. AMA paperwork signed.     IOL:  - Continue Close Maternal/Fetal Monitoring  - Pitocin Augmentation per protocol  - Recheck 2-4 hours or PRN    cHTN w/ DIANE w/SF:  - BP: (134-178)/(60-92) 134/65  - Pre E labs wnl on admit  - Continue to monitor Bps  - declines magnesium at this time, AMA paperwork signed    GDMA2:  - B  - BG q4 in latent, q2 in active labor        Trupti Torres MD/MPH  OB/GYN PGY4

## 2023-06-26 NOTE — ASSESSMENT & PLAN NOTE
Prior questionable DM dx vs pre-diabetes (A1c 5.8% on metformin prior to P1), managed with insulin  Negative 2hGTT postpartum  Positive [employer] screen this pregnancy    Current regimen:  - Lantus 11 u PM    Blood sugar review  Fastings for the most part in the high 90s to low 100s  Postprandials at BF controlled. Lunch and dinner above threshold.    Recommendations:  1. Consider increasing to Lantus to 13u Qhs. Given proximity to delivery, would not recommend adding a second insulin dosing.  2. Fasting and 2 hour postprandial blood sugars three times daily   If >200, patient to call back to determine optimal treatment.  3. Continue twice weekly PNT  4.. Delivery timing per CHTN guidelines  5. Recommend postpartum screening for underlying glucose intolerance. This can be done at either 6 weeks postpartum -or- on postpartum day 2 while still inpatient.  6. Diabetes screening with primary care provider every 1-3 years

## 2023-06-26 NOTE — PROGRESS NOTES
LABOR NOTE    S:  Complaints: No.  Epidural working:  not applicable  Resident to bedside for routine cervical check     O: BP (!) 142/76   Pulse 91   Temp 97.9 °F (36.6 °C) (Oral)   Resp 18   LMP 10/19/2022 (Approximate)   SpO2 99%   Breastfeeding No     FHT: 130bpm, mod BTBV, + accels, - decels, Cat 1 (reassuring)  CTX: none noted  SVE: 3    TIMELINE:  0100: 3  0145: cytotec 1 given  0500: 3, godfrey balloon placed  0545: cytotec #2  1000: 3/60/-3, godfrey out, will start pit  1400: 4/60/-3    PLAN:      IOL:  - Continue Close Maternal/Fetal Monitoring  - Pitocin Augmentation per protocol  - Recheck 2-4 hours or PRN  - Fetal station too high to rupture, will attempt on next check. Discussion internal monitoring with IUPC and FSE when able, as patient is difficult to monitor.    cHTN w/ DIANE w/SF:  - BP: (134-178)/(60-92) 142/76  - Pre E labs wnl on admit  - Continue to monitor Bps  - declines magnesium at this time, AMA paperwork signed    GDMA2:  - B  - BG q4 in latent, q2 in active labor        Trupti Torres MD/MPH  OB/GYN PGY4

## 2023-06-26 NOTE — CARE UPDATE
Resident to bedside due to patient refusing fetal monitoring. Patient received cytotec at 0145. Explained hospital policy for continuous fetal monitoring with induction agents including cytotec and pitocin. Patient declines fetal monitoring as it interferes with her ability to sleep. Discussed potential serious risks including fetal death or permanent injury. Patient understands these risks and declines fetal monitoring at this time.     Patient had 2 severe range blood pressures that normalized with repositioning of BP cuff. Discussed that patient is at high risk for superimposed pre-E and if she continues to have severe range BP, would recommend magnesium for seizure prophylaxis. Pre-E labs normal on admit            Hanane Muse MD PGY-4  Obstetrics and Gynecology

## 2023-06-27 LAB
BASOPHILS # BLD AUTO: 0.02 K/UL (ref 0–0.2)
BASOPHILS NFR BLD: 0.1 % (ref 0–1.9)
DIFFERENTIAL METHOD: ABNORMAL
EOSINOPHIL # BLD AUTO: 0 K/UL (ref 0–0.5)
EOSINOPHIL NFR BLD: 0.1 % (ref 0–8)
ERYTHROCYTE [DISTWIDTH] IN BLOOD BY AUTOMATED COUNT: 16.3 % (ref 11.5–14.5)
GLUCOSE SERPL-MCNC: 120 MG/DL (ref 70–110)
HCT VFR BLD AUTO: 31.6 % (ref 37–48.5)
HGB BLD-MCNC: 10.4 G/DL (ref 12–16)
IMM GRANULOCYTES # BLD AUTO: 0.05 K/UL (ref 0–0.04)
IMM GRANULOCYTES NFR BLD AUTO: 0.3 % (ref 0–0.5)
LYMPHOCYTES # BLD AUTO: 2.1 K/UL (ref 1–4.8)
LYMPHOCYTES NFR BLD: 14 % (ref 18–48)
MCH RBC QN AUTO: 30 PG (ref 27–31)
MCHC RBC AUTO-ENTMCNC: 32.9 G/DL (ref 32–36)
MCV RBC AUTO: 91 FL (ref 82–98)
MONOCYTES # BLD AUTO: 1.2 K/UL (ref 0.3–1)
MONOCYTES NFR BLD: 8 % (ref 4–15)
NEUTROPHILS # BLD AUTO: 11.4 K/UL (ref 1.8–7.7)
NEUTROPHILS NFR BLD: 77.5 % (ref 38–73)
NRBC BLD-RTO: 0 /100 WBC
PLATELET # BLD AUTO: 233 K/UL (ref 150–450)
PMV BLD AUTO: 11.3 FL (ref 9.2–12.9)
RBC # BLD AUTO: 3.47 M/UL (ref 4–5.4)
WBC # BLD AUTO: 14.71 K/UL (ref 3.9–12.7)

## 2023-06-27 PROCEDURE — 85025 COMPLETE CBC W/AUTO DIFF WBC: CPT | Performed by: STUDENT IN AN ORGANIZED HEALTH CARE EDUCATION/TRAINING PROGRAM

## 2023-06-27 PROCEDURE — 99900035 HC TECH TIME PER 15 MIN (STAT)

## 2023-06-27 PROCEDURE — 63600175 PHARM REV CODE 636 W HCPCS: Performed by: STUDENT IN AN ORGANIZED HEALTH CARE EDUCATION/TRAINING PROGRAM

## 2023-06-27 PROCEDURE — 11000001 HC ACUTE MED/SURG PRIVATE ROOM

## 2023-06-27 PROCEDURE — 82947 ASSAY GLUCOSE BLOOD QUANT: CPT | Performed by: STUDENT IN AN ORGANIZED HEALTH CARE EDUCATION/TRAINING PROGRAM

## 2023-06-27 PROCEDURE — 25000003 PHARM REV CODE 250: Performed by: STUDENT IN AN ORGANIZED HEALTH CARE EDUCATION/TRAINING PROGRAM

## 2023-06-27 PROCEDURE — 63600175 PHARM REV CODE 636 W HCPCS

## 2023-06-27 PROCEDURE — 25000003 PHARM REV CODE 250

## 2023-06-27 PROCEDURE — 36415 COLL VENOUS BLD VENIPUNCTURE: CPT | Performed by: STUDENT IN AN ORGANIZED HEALTH CARE EDUCATION/TRAINING PROGRAM

## 2023-06-27 RX ORDER — LANOLIN ALCOHOL/MO/W.PET/CERES
1 CREAM (GRAM) TOPICAL DAILY
Status: DISCONTINUED | OUTPATIENT
Start: 2023-06-27 | End: 2023-06-29 | Stop reason: HOSPADM

## 2023-06-27 RX ORDER — OXYCODONE HYDROCHLORIDE 5 MG/1
10 TABLET ORAL EVERY 6 HOURS PRN
Status: DISCONTINUED | OUTPATIENT
Start: 2023-06-27 | End: 2023-06-29 | Stop reason: HOSPADM

## 2023-06-27 RX ORDER — LABETALOL HYDROCHLORIDE 5 MG/ML
20 INJECTION, SOLUTION INTRAVENOUS ONCE
Status: COMPLETED | OUTPATIENT
Start: 2023-06-27 | End: 2023-06-27

## 2023-06-27 RX ORDER — OXYCODONE HYDROCHLORIDE 5 MG/1
5 TABLET ORAL EVERY 6 HOURS PRN
Status: DISCONTINUED | OUTPATIENT
Start: 2023-06-27 | End: 2023-06-29 | Stop reason: HOSPADM

## 2023-06-27 RX ORDER — NIFEDIPINE 30 MG/1
30 TABLET, EXTENDED RELEASE ORAL DAILY
Status: DISCONTINUED | OUTPATIENT
Start: 2023-06-27 | End: 2023-06-29 | Stop reason: HOSPADM

## 2023-06-27 RX ORDER — NIFEDIPINE 30 MG/1
30 TABLET, EXTENDED RELEASE ORAL DAILY
Status: DISCONTINUED | OUTPATIENT
Start: 2023-06-27 | End: 2023-06-27

## 2023-06-27 RX ADMIN — SIMETHICONE 80 MG: 80 TABLET, CHEWABLE ORAL at 04:06

## 2023-06-27 RX ADMIN — ACETAMINOPHEN 650 MG: 325 TABLET, FILM COATED ORAL at 11:06

## 2023-06-27 RX ADMIN — DOCUSATE SODIUM 200 MG: 100 CAPSULE, LIQUID FILLED ORAL at 08:06

## 2023-06-27 RX ADMIN — CHLORHEXIDINE GLUCONATE 0.12% ORAL RINSE 10 ML: 1.2 LIQUID ORAL at 09:06

## 2023-06-27 RX ADMIN — SIMETHICONE 80 MG: 80 TABLET, CHEWABLE ORAL at 08:06

## 2023-06-27 RX ADMIN — OXYCODONE HYDROCHLORIDE AND ACETAMINOPHEN 1 TABLET: 10; 325 TABLET ORAL at 06:06

## 2023-06-27 RX ADMIN — NIFEDIPINE 30 MG: 30 TABLET, FILM COATED, EXTENDED RELEASE ORAL at 09:06

## 2023-06-27 RX ADMIN — PRENATAL VIT W/ FE FUMARATE-FA TAB 27-0.8 MG 1 TABLET: 27-0.8 TAB at 08:06

## 2023-06-27 RX ADMIN — FERROUS SULFATE TAB 325 MG (65 MG ELEMENTAL FE) 1 EACH: 325 (65 FE) TAB at 08:06

## 2023-06-27 RX ADMIN — ENOXAPARIN SODIUM 40 MG: 40 INJECTION SUBCUTANEOUS at 08:06

## 2023-06-27 RX ADMIN — LABETALOL HYDROCHLORIDE 20 MG: 5 INJECTION INTRAVENOUS at 04:06

## 2023-06-27 RX ADMIN — SODIUM CHLORIDE, POTASSIUM CHLORIDE, SODIUM LACTATE AND CALCIUM CHLORIDE: 600; 310; 30; 20 INJECTION, SOLUTION INTRAVENOUS at 12:06

## 2023-06-27 RX ADMIN — KETOROLAC TROMETHAMINE 30 MG: 30 INJECTION, SOLUTION INTRAMUSCULAR; INTRAVENOUS at 06:06

## 2023-06-27 RX ADMIN — CHLORHEXIDINE GLUCONATE 0.12% ORAL RINSE 10 ML: 1.2 LIQUID ORAL at 08:06

## 2023-06-27 RX ADMIN — MAGNESIUM SULFATE IN WATER 2 G/HR: 40 INJECTION, SOLUTION INTRAVENOUS at 12:06

## 2023-06-27 RX ADMIN — ACETAMINOPHEN 650 MG: 325 TABLET, FILM COATED ORAL at 04:06

## 2023-06-27 RX ADMIN — KETOROLAC TROMETHAMINE 30 MG: 30 INJECTION, SOLUTION INTRAMUSCULAR; INTRAVENOUS at 11:06

## 2023-06-27 RX ADMIN — IBUPROFEN 600 MG: 600 TABLET ORAL at 06:06

## 2023-06-27 NOTE — TRANSFER OF CARE
Anesthesia Transfer of Care Note    Patient: Marcie Cheek    Procedure(s) Performed: * No procedures listed *    Anesthesia Type: general    Transport from OR: Transported from OR on 6-10 L/min O2 by face mask with adequate spontaneous ventilation    Post pain: adequate analgesia    Post assessment: no apparent anesthetic complications    Post vital signs: stable    Level of consciousness: awake, alert and oriented    Nausea/Vomiting: no nausea/vomiting    Complications: none    Transfer of care protocol was followed      Last vitals:   Visit Vitals  BP (!) 150/84   Pulse 94   Temp 36.6 °C (97.9 °F) (Oral)   Resp 18   LMP 10/19/2022 (Approximate)   SpO2 99%   Breastfeeding No

## 2023-06-27 NOTE — L&D DELIVERY NOTE
[facility] - Labor & Delivery   Section   Operative Note    SUMMARY    Section Operative Note  Procedure Date: 2023    Procedure: Primary  Section via Pfannenstiel skin incision    Indications:  Emergent delivery due to umbilical cord prolapse    Pre-operative Diagnosis:   IUP at 35 week 0 day pregnancy  cHTN w/ superimposed pre-E w/ SF (BP)  Insulin dependent gestational diabetes  Morbid obesity (pre pregnancy BMI 42)    Post-operative Diagnosis:   S/p pLTCS  Same as above    Surgeon: Holley Hernandez MD     Assistants: Hanane Muse MD PGY4  Rupinder Vincent MD PGY2    Anesthesia: General endotracheal anesthesia    Findings: Normal appearing ovaries, fallopian tubes, uterus, and placenta    Estimated Blood Loss:   800cc           Total IV Fluids: See anesthesia report     UOP: See anesthesia report    Specimens: single viable female infant with APGARS 5/9, Placenta which was sent to pathology                  Complications:  None; patient tolerated the procedure well.           Disposition: PACU - hemodynamically stable.           Condition: stable    Procedure Details   See care update by Dr Hernandez at 1900 on  for details. Cord prolapse identified after amniotomy. Cord was noted to be pulsating. Dr Hernandez elevated the fetal presenting part away from the cord. Anesthesia and charge RN notified. Patient was emergently brought to the OR for emergency  delivery. The risks, benefits, complications, treatment options, and expected outcomes were discussed with the patient.  The patient concurred with the proposed plan, giving informed consent.    The patient was taken to the operating room. Dr Vincent took position elevating the fetal presenting part away from the umbilical cord while Hibiclens wash was performed. General anesthesia was performed due to emergent nature of delivery. A Time Out was held and the above information confirmed. The patient was draped while placed in a dorsal  supine position with a left lateral tilt. Preoperative antibiotics ancef 3g were administered. After endotracheal anesthesia was achieved, a Pfannenstiel incision was made and carried down through the subcutaneous tissue to the fascia. Fascial incision was made and extended transversely.  The peritoneum was identified, found to be free of adherent bowel and entered sharply with two hemostats and Metzenbaum scissors. Peritoneal incision was extended longitudinally. The vesico-uterine peritoneum was identified and bladder blade was inserted. A low transverse uterine incision was made with knife and extended with cephalocaudad finger traction. The amniotic sac was noted to be previously ruptured and the infant was noted to be in breech position. Bilateral lower extremities were brought to the incision and elevated out of the pelvis. Breech maneuvers were performed. The patient delivered a single viable female infant without difficulty.  After the umbilical cord was clamped and cut cord blood and cord gases were obtained for evaluation. The placenta was removed intact and appeared normal and was sent to pathology. The uterus was exteriorized. The uterine outline, tubes and ovaries appeared normal. The uterine incision was closed with running locked sutures of 1-chromic. Hemostasis was observed. The uterus was returned to the abdominal cavity. Incision was reinspected and good hemostasis was noted. The abdominal cavity was suctioned and mopped with a moist lap to remove clots. The muscle was reapproximated with 2-0 vicryl. The fascia was then reapproximated with running sutures of 1-PDS. The subcutaneous fat and skin was reapproximated with 2-plain gut and 4-0 Monocryl respectively.    Instrument, sponge, and needle counts were correct prior the abdominal closure and at the conclusion of the case.     **This patient is a candidate for a Trial Of Labor After  Delivery**    Patient tolerated procedure well.  "    Hanane Muse MD PGY-4  Obstetrics and Gynecology       Delivery Information for Girl Marcie Cheek    Birth information:  YOB: 2024   Time of birth: 5:59 PM   Sex: female   Head Delivery Date/Time: 2023  5:59 PM   Delivery type: , Low Transverse   Gestational Age: 35w0d        Delivery Providers    Delivering clinician: Holley Hernandez MD   Provider Role    MD Geena Shore, RN               Measurements    Weight: 3020 g  Weight (lbs): 6 lb 10.5 oz  Length: 50.8 cm  Length (in): 20"  Head circumference: 34.5 cm  Chest circumference: 33 cm         Apgars    Living status: Living  Apgars:  1 min.:  5 min.:  10 min.:  15 min.:  20 min.:    Skin color:  0  1       Heart rate:  2  2       Reflex irritability:  1  2       Muscle tone:  1  2       Respiratory effort:  1  2       Total:  5  9       Apgars assigned by: NICU         Operative Delivery    Forceps attempted?: No  Vacuum extractor attempted?: No         Shoulder Dystocia    Shoulder dystocia present?: No           Presentation    Presentation: Footling Breech  Position: Middle           Interventions/Resuscitation    Method: NICU Attended       Cord    Vessels: 3 vessels  Complications: None  Delayed Cord Clamping?: No  Cord Clamped Date/Time: 2023  5:59 PM  Cord Blood Disposition: Sent with Baby  Gases Sent?: Yes  Stem Cell Collection (by MD): No       Placenta    Placenta delivery date/time: 2023 1801  Placenta removal: Manual removal  Placenta appearance: Intact  Placenta disposition: Discarded           Labor Events:       labor: Yes     Labor Onset Date/Time:         Dilation Complete Date/Time:         Start Pushing Date/Time:         Start Pushing Date/Time:       Rupture Date/Time:            Rupture type:          Fluid Amount:       Fluid Color:                steroids:       Antibiotics given for GBS: No     Induction: balloon dilation (Knapp);misoprostol   "   Indications for induction:  Severe Preeclampsia     Augmentation: oxytocin     Indications for augmentation:       Labor complications: Cord Prolapse     Additional complications:          Cervical ripening:                     Delivery:      Episiotomy: None     Indication for Episiotomy:       Perineal Lacerations: None Repaired:      Periurethral Laceration:   Repaired:     Labial Laceration:   Repaired:     Sulcus Laceration:   Repaired:     Vaginal Laceration:   Repaired:     Cervical Laceration:   Repaired:     Repair suture:       Repair # of packets:       Last Value - EBL - Nursing (mL):       Sum - EBL - Nursing (mL): 0     Last Value - EBL - Anesthesia (mL):      Calculated QBL (mL): 855      Vaginal Sweep Performed: Yes     Surgicount Correct: Yes     Vaginal Packing: No Quantity:       Other providers:       Anesthesia    Method: General          Details (if applicable):  Trial of Labor Yes    Categorization: Primary    Priority: Urgent   Indications for : Cord Prolapse   Incision Type: low transverse     Additional  information:  Forceps:    Vacuum:    Breech:    Observed anomalies    Other (Comments):       I was present for the entire procedure, and agree with the above resident's assessment of findings and description of procedure. Reviewed course of delivery with the patient's  after pt safely extubated.   Holley Hernandez M.D.

## 2023-06-27 NOTE — LACTATION NOTE
06/27/23 1100   Maternal Infant Feeding   Maternal Emotional State relaxed   Equipment Type   Breast Pump Type double electric, hospital grade   Breast Pump Flange Type hard   Breast Pumping   Breast Pumping Interventions frequent pumping encouraged     LC to room: 1st visit. Introduced self, reviewed pumping schedule. Client verbalized understanding to pump 8-10 times in a 24 hour period on INITIATE setting, clean after each use, and sanitize once every 24 hours. All other information reviewed below utilizing the NICU breastfeeding handout guide. All questions answered, extension on whiteboard, encouraged client to call for next pumping session.    Pumping for the Baby in NICU     Preparation and Hygiene:  Shower daily.  Wear a clean bra each day and wash daily in warm soapy water.  Change wet or moist breast pads frequently.  Moist pads can promote growth of germs.  Actively wash your hands, paying close attention to the area around and under your fingernails, thoroughly with soap and water for 15 seconds before pumping or handling your milk.  Re-wash your hands if you touch anything (scratching your nose, answering the phone, etc) while pumping or handling your milk.   Before pumping your breasts, assemble the pump collection kit and have ready the sterile container and labels.  Place these items on a clean surface next to the breast pump.  Each time after you have finished pumping, take apart all of the parts of the breast pump collection kit and place them in a separate cleaning container (do not place them in the sink).  Be sure to remove the yellow valve from the breast shield and separate the white membrane from the yellow valve.  Rinse all of these parts with cool water.  Then use a new sponge and/or bottle brush and dishwashing detergent to clean the parts.  Rinse off the soapy water with cool water and air dry on a clean towel covered with a clean cloth.  All parts may also be washed after each use in  the top rack of a .  Once each day, sanitize all of the parts of the breast pump collection kit.  This can be done by boiling the kit parts for 10 minutes or by using a Quick Clean Micro-Steam Bag made by [employer]  If condensation appears in the tubing, continue to run the pump with the tubing attached for 1-2 minutes or until the tubing is dry.   Notify your babys nurse or doctor if you become ill or need to take any medication, even over-the-counter medicines.  Collection and Storage of Expressed Breast milk:       1.    Pump your breasts at least 8-10 times every 24 hours.  Double pump (both breasts at the same time) for at least 15-20 minutes using the most suction that is comfortable.    2.    Write the date and time of pumping and the name of any medications you are taking on the babys pre-printed hospital identification label.   3.    Place your babys pre-printed hospital identification label on each container of breast milk.  Additional pre-printed labels can be obtained from your babys nurse.  If your expressed breast milk is not correctly labeled, the nurse cannot feed the milk to the baby.       4.       Do not touch the inside of the storage containers or lids.  5.        Pour the amount of expressed milk needed for 1 of your babys feedings into each storage container.  Use a new container(s) for each pumping.  Additional storage containers can be obtained from your babys nurse.  6.    Tightly screw the lid onto the container and place immediately into the refrigerator for daily transportation to the hospital.   Do not freeze your milk unless asked to do so by your babys nurse.  However, if you are not able to visit your baby each day, place the expressed breast milk in the freezer.  7.        Expressed breast milk should be refrigerated or frozen within 4 hours of pumping.  8.         Do not store expressed breast milk on the door of your refrigerator or freezer where the  temperature is warmer.

## 2023-06-27 NOTE — PLAN OF CARE
Copied from baby's chart    SOCIAL WORK DISCHARGE PLANNING ASSESSMENT     SW completed discharge planning assessment with pt's mother in mother's room 646 .  Pt's mother was easily engaged. Education on the role of  was provided. Emotional support provided throughout assessment.        Legal Name: Eugene Cheek         :  2024  Address: [address] to [facility] [place] [address]  Parent's Phone Numbers: Marcie [phone]   Maximiliano [phone]     Pediatrician: Nia Jefferson    Education: Information given on NICU Education Classes; Physician/NNP daily rounds; and Postpartum Depression signs.   Potential Eligibility for SSI Benefits: No            Patient Active Problem List   Diagnosis    Prematurity    Respiratory distress in     Alteration in nutrition    Healthcare maintenance    Need for observation and evaluation of  for sepsis            Birth Hospital:[facility]           MANPREET: [birth year]     Birth Weight:   3.02 kg (6 lb 10.5 oz)              Birth Length: 52 cm                      Gestational Age: 35w0d           Apgars    Living status: Living  Apgars:  1 min.:  5 min.:  10 min.:  15 min.:  20 min.:    Skin color:  0  1          Heart rate:  2  2          Reflex irritability:  1  2          Muscle tone:  1  2          Respiratory effort:  1  2          Total:  5  9          Apgars assigned by: NICU              23 0953   NICU Assessment   Assessment Type Discharge Planning Assessment   Source of Information family   Verified Demographic and Insurance Information Yes   Insurance Commercial   Commercial BCBS [place]   Pastoral Care/Clergy/ Contact Status none needed   Lives With mother;father;sister   Number people in home 4 including pt   Relationship Status of Parents    Other children (include names and ages) 13 month daughter   Mother Employed Full Time   Mother's Employer [employer]   Father's Involvement Fully  "Involved   Is Father signing the birth certificate Yes   Father Name and  Maximiliano Cheek  84   Father's Employer [employer]   Family Involvement Moderate   Other Contacts Names and Numbers Chas Koo" [place] [place] [phone]   Infant Feeding Plan breastfeeding;formula feeding   Does baby have crib or safe sleep space? Yes   Do you have a car seat? Yes   Resource/Environmental Concerns none   Environment Concerns none   Resources/Education Provided [facility];Preparing for Your Baby's Discharge Home;Support Resources for NICU Families;My NICU Baby Claudia;Post Partum Depression   DCFS No indications (Indicators for Report)   Discharge Plan A Home with family   Do you have any problems affording any of your prescribed medications? No     "

## 2023-06-27 NOTE — PLAN OF CARE
Pt's  admitted to the NICU following delivery. NICU SW team to follow pt and family. MBU SW available should pt have any discharge planning needs.        23 1428   OB SCREEN   Assessment Type Discharge Planning Brief Assessment   Source of Information health record   Received Prenatal Care Yes   Any indications/suspicions for None   Is this a teen pregnancy No   Is the baby in NICU Yes   Indication for adoption/Safe Haven No   Indication for DME/post-acute needs No   HIV (+) No   Any congenital  disorders No   Fetal demise/ death No

## 2023-06-27 NOTE — PROGRESS NOTES
POSTPARTUM PROGRESS NOTE    Subjective:     PPD/POD#: 1   Procedure: Primary LTCS (GETA)   EGA: 35w1d   N/V: No   F/C: No   Abd Pain: Mild, well-controlled with oral pain medication   Lochia: Mild   Voiding: Yes, via godfrey   Ambulating: No   Bowel fnc: No   Contraception: Progesterone only pills     Objective:      Temp:  [97.4 °F (36.3 °C)-98 °F (36.7 °C)] 98 °F (36.7 °C)  Pulse:  [] 84  Resp:  [16-19] 18  SpO2:  [93 %-100 %] 95 %  BP: (118-188)/() 134/72    Abdomen: Soft, appropriately tender   Uterus: Firm, no fundal tenderness   Incision: Bandage in place without shadowing     Lab Review    Recent Labs   Lab 23  0028      K 4.0      CO2 18*   BUN 9   CREATININE 0.7   *   PROT 7.1   BILITOT 0.3   ALKPHOS 121   ALT 15   AST 17       Recent Labs   Lab 23  0028 23  0503   WBC 10.45 14.71*   HGB 12.0 10.4*   HCT 35.0* 31.6*   MCV 90 91    233         I/O    Intake/Output Summary (Last 24 hours) at 2023 0623  Last data filed at 2023 0550  Gross per 24 hour   Intake 1820.14 ml   Output 2255 ml   Net -434.86 ml        Assessment and Plan:   Postpartum care:  - Patient doing well.  - Continue routine management and advances.    cHTN with si PreE with SF  - BP as above  - asymptomatic  - preE labs as above  - UOP: 0.99 cc/kg/hr adequate  - Mag: continue, will discontinue at 1800  - Hypertensive agent : Procardia 30 XL ()    Gestational Diabetes  - Fasting B  - Will repeat tomorrow AM  - Will need 2 hour gtt at postpartum visit    Obesity  - PrePreg BMI 42  - Encourage ambulation, SCDs in bed  - Lovenox: 40 mg BID    Anemia   - H/H as above  - QBL: 800 cc  - asymptomatic  - iron/colace    Rubella nonimmune  - Will need MMR postpartum    Mendez Torres MD PGY-2  Obstetrics and Gynecology        I have seen this patient and agree with above. Reviewed course of delivery with her as well  Holley Hernandez M.D.

## 2023-06-27 NOTE — CARE UPDATE
I went to bedside to assess patient and review course of labor.  Cervical exam unchanged : 4/60/-2, head applied, but cervix posterior  AROM clear fluid  Head felt all around cervix, called for FSE. However, head then shifted to maternal right and hand or foot felt on the left. Pinching the limb caused it to retract and head again replaced entire area over cervix.   Head shifted another time, hand or foot felt again, but this time with cord palpated next to it.   Cord prolapse was called.  RN called charge and OB upper level resident. Charge called anesthesia. As I situated myself on the bed, while holding the cord (which was felt to be pulsing), explained to patient and FOB what was happening and that CS would be necessary, that this would have to be under general anesthesia.   Once in OR, Dr. Vincent took my position holding the cord, and I scrubbed in. A hibaclens scrub was being performed by Dr. Muse as anesthesia worked to get medications through the IV for induction.   Once airway secured, STAT CS was performed without difficulty. 3g Ancef were given.  Infant noted to be in transverse position and was delivered with routine breech maneuvers   Female baby was passed off for NICU Assessment.   Cord gases obtained.  Uterine atony noted after routine pitocin was given. Hemabate given with improvement in tone.   Hysterotomy closed in one layer with good hemostasis  Suctioned performed of abdomen and gutters.   Muscle re approximated in interrupted fashion. PDS from each corner of fascia was tied at the midline. Sub Q was closed in two layers after copious irrigation and suction.   Knapp catheter was placed at the end of the case noting clear urine.  Pt was extubated and brought to recovery in stable condition.   Reviewed this course of events with the FOB. Baby doing well and is in the recovery room with her father.  EBL: 800cc    Holley Hernandez M.D.

## 2023-06-27 NOTE — ANESTHESIA POSTPROCEDURE EVALUATION
Anesthesia Post Evaluation    Patient: Marcie Cheek    Procedure(s) Performed: Procedure(s) (LRB):   SECTION (N/A)    Final Anesthesia Type: general      Patient location during evaluation: labor & delivery  Patient participation: Yes- Able to Participate  Level of consciousness: awake and alert  Post-procedure vital signs: reviewed and stable  Pain management: adequate  Airway patency: patent  MARIBEL mitigation strategies: Multimodal analgesia  PONV status at discharge: No PONV  Anesthetic complications: no      Cardiovascular status: blood pressure returned to baseline  Respiratory status: unassisted and spontaneous ventilation  Follow-up not needed.          Vitals Value Taken Time   /62 23 1001   Temp 36.7 °C (98 °F) 23 0800   Pulse 100 23 1055   Resp 18 23 0800   SpO2 99 % 23 1055   Vitals shown include unvalidated device data.      Event Time   Out of Recovery 21:45:00         Pain/Alla Score: Pain Rating Prior to Med Admin: 3 (2023  6:06 AM)

## 2023-06-27 NOTE — CARE UPDATE
Resident to bedside to discuss severe range BP. Multiple systolic blood pressures 170-180s. Labetalol 20 ordered to treat acute hypertension. Discussed recommendation for magnesium sulfate for seizure ppx in setting of superimposed pre-eclampsia with severe features due to these severe range blood pressures requiring acute antihypertensives.  Patient declines magnesium sulfate.     Will continue to monitor BP.    Hanane Muse MD PGY-4  Obstetrics and Gynecology

## 2023-06-27 NOTE — ANESTHESIA PROCEDURE NOTES
Intubation    Date/Time: 6/26/2023 5:53 PM  Performed by: Kosta Chun DO  Authorized by: Stacy Hernandez MD     Intubation:     Induction:  Rapid sequence induction    Intubated:  Postinduction    Mask Ventilation:  N/a    Attempts:  1    Attempted By:  Resident anesthesiologist    Method of Intubation:  Direct    Blade:  [place] 3    Laryngeal View Grade: Grade I - full view of cords      Difficult Airway Encountered?: No      Airway Device:  Oral endotracheal tube    Airway Device Size:  7.0    Style/Cuff Inflation:  Cuffed (inflated to minimal occlusive pressure)    Tube secured:  21    Secured at:  The lips    Placement Verified By:  Capnometry    Complicating Factors:  None    Findings Post-Intubation:  BS equal bilateral and atraumatic/condition of teeth unchanged

## 2023-06-27 NOTE — CARE UPDATE
To bedside to review course of delivery and indication for magnesium sulfate    Labetalol 20 brought BP down to 159/73.  Reviewed that BP have been a mild range or severe all day(pt refused magnesium earlier in her course), she is still feeling the affects of ketamine and continues to have elevated BP requiring IV pushes  Reviewed risk of hypertension in the postpartum period  Pt amenable to magnesium sulfate at this point  All questions answered about delivery and recovery

## 2023-06-28 LAB — POCT GLUCOSE: 90 MG/DL (ref 70–110)

## 2023-06-28 PROCEDURE — 25000003 PHARM REV CODE 250: Performed by: STUDENT IN AN ORGANIZED HEALTH CARE EDUCATION/TRAINING PROGRAM

## 2023-06-28 PROCEDURE — 11000001 HC ACUTE MED/SURG PRIVATE ROOM

## 2023-06-28 PROCEDURE — 63600175 PHARM REV CODE 636 W HCPCS

## 2023-06-28 PROCEDURE — 99024 POSTOP FOLLOW-UP VISIT: CPT | Mod: ,,, | Performed by: OBSTETRICS & GYNECOLOGY

## 2023-06-28 PROCEDURE — 25000003 PHARM REV CODE 250

## 2023-06-28 PROCEDURE — 99024 PR POST-OP FOLLOW-UP VISIT: ICD-10-PCS | Mod: ,,, | Performed by: OBSTETRICS & GYNECOLOGY

## 2023-06-28 RX ORDER — HYDROCORTISONE 1 %
CREAM (GRAM) TOPICAL 2 TIMES DAILY PRN
Status: DISCONTINUED | OUTPATIENT
Start: 2023-06-29 | End: 2023-06-29 | Stop reason: HOSPADM

## 2023-06-28 RX ORDER — ACETAMINOPHEN 325 MG/1
650 TABLET ORAL EVERY 6 HOURS PRN
Status: DISCONTINUED | OUTPATIENT
Start: 2023-06-28 | End: 2023-06-29 | Stop reason: HOSPADM

## 2023-06-28 RX ADMIN — IBUPROFEN 600 MG: 600 TABLET ORAL at 05:06

## 2023-06-28 RX ADMIN — ACETAMINOPHEN 650 MG: 325 TABLET, FILM COATED ORAL at 04:06

## 2023-06-28 RX ADMIN — ACETAMINOPHEN 650 MG: 325 TABLET, FILM COATED ORAL at 10:06

## 2023-06-28 RX ADMIN — PRENATAL VIT W/ FE FUMARATE-FA TAB 27-0.8 MG 1 TABLET: 27-0.8 TAB at 08:06

## 2023-06-28 RX ADMIN — OXYCODONE HYDROCHLORIDE 5 MG: 5 TABLET ORAL at 09:06

## 2023-06-28 RX ADMIN — DOCUSATE SODIUM 200 MG: 100 CAPSULE, LIQUID FILLED ORAL at 08:06

## 2023-06-28 RX ADMIN — ACETAMINOPHEN 650 MG: 325 TABLET, FILM COATED ORAL at 12:06

## 2023-06-28 RX ADMIN — DOCUSATE SODIUM 200 MG: 100 CAPSULE, LIQUID FILLED ORAL at 10:06

## 2023-06-28 RX ADMIN — SIMETHICONE 80 MG: 80 TABLET, CHEWABLE ORAL at 12:06

## 2023-06-28 RX ADMIN — IBUPROFEN 600 MG: 600 TABLET ORAL at 11:06

## 2023-06-28 RX ADMIN — ENOXAPARIN SODIUM 40 MG: 40 INJECTION SUBCUTANEOUS at 10:06

## 2023-06-28 RX ADMIN — ACETAMINOPHEN 650 MG: 325 TABLET, FILM COATED ORAL at 09:06

## 2023-06-28 RX ADMIN — NIFEDIPINE 30 MG: 30 TABLET, FILM COATED, EXTENDED RELEASE ORAL at 08:06

## 2023-06-28 RX ADMIN — IBUPROFEN 600 MG: 600 TABLET ORAL at 12:06

## 2023-06-28 RX ADMIN — FERROUS SULFATE TAB 325 MG (65 MG ELEMENTAL FE) 1 EACH: 325 (65 FE) TAB at 08:06

## 2023-06-28 RX ADMIN — OXYCODONE HYDROCHLORIDE 10 MG: 5 TABLET ORAL at 12:06

## 2023-06-28 RX ADMIN — SIMETHICONE 80 MG: 80 TABLET, CHEWABLE ORAL at 09:06

## 2023-06-28 RX ADMIN — ENOXAPARIN SODIUM 40 MG: 40 INJECTION SUBCUTANEOUS at 08:06

## 2023-06-28 NOTE — PROGRESS NOTES
POSTPARTUM PROGRESS NOTE    Subjective:     PPD/POD#: 2   Procedure: Primary LTCS (GETA)   EGA: 35w1d   N/V: No   F/C: No   Abd Pain: Mild, well-controlled with oral pain medication   Lochia: Mild   Voiding: Yes   Ambulating: Yes   Bowel fnc: Yes   Contraception: Progesterone only pills     Objective:      Temp:  [97.5 °F (36.4 °C)-98.2 °F (36.8 °C)] 97.5 °F (36.4 °C)  Pulse:  [] 89  Resp:  [17-19] 18  SpO2:  [96 %-100 %] 100 %  BP: (117-138)/(56-78) 132/78    Abdomen: Soft, appropriately tender   Uterus: Firm, no fundal tenderness   Incision: Clean, dry, and intact.  No erythema, induration, or drainage.     Lab Review    Recent Labs   Lab 238      K 4.0      CO2 18*   BUN 9   CREATININE 0.7   *   PROT 7.1   BILITOT 0.3   ALKPHOS 121   ALT 15   AST 17         Recent Labs   Lab 238 23  0503   WBC 10.45 14.71*   HGB 12.0 10.4*   HCT 35.0* 31.6*   MCV 90 91    233           I/O    Intake/Output Summary (Last 24 hours) at 2023 0621  Last data filed at 2023 0015  Gross per 24 hour   Intake --   Output 3630 ml   Net -3630 ml          Assessment and Plan:   Postpartum care:  - Patient doing well.  - Continue routine management and advances.    cHTN with si PreE with SF  - BP as above  - asymptomatic  - preE labs as above  - Mag: completed  - Hypertensive agent : Procardia 30 XL ()    Gestational Diabetes  - Fasting B  - Will need 2 hour gtt at postpartum visit    Obesity  - PrePreg BMI 42  - Encourage ambulation, SCDs in bed  - Lovenox: 40 mg BID    Anemia   - H/H as above  - QBL: 800 cc  - asymptomatic  - iron/colace    Rubella nonimmune  - Will need MMR postpartum    Mendez Torres MD PGY-2  Obstetrics and Gynecology

## 2023-06-28 NOTE — PLAN OF CARE
VSS over night. No severe pressures. Patient denies HA, dizziness, vision changes, and RUQ pain. Patient safety maintained, side rails up, bed low and locked position. Knapp discontinued per protocol; patient ambulating independently and voiding spontaneously. Pain well controlled with scheduled and PRN PO pain medication. Fundus midline and firm with moderate lochia. Incision dressing removed; incision site is clean, dry, and free of signs of infection. Patient able to visit infant in NICU. Significant other at bedside and attentive to patient's needs. No additional needs at this time.

## 2023-06-28 NOTE — PHYSICIAN QUERY
PT Name: Marcie Cheek  MR #: [id number]    DOCUMENTATION CLARIFICATION      CDS/: KRISTEL Roberto,RNC-MNN        Contact information:[email or web address]    This form is a permanent document in the medical record.      Query Date: June 28, 2023    By submitting this query, we are merely seeking further clarification of documentation. Please utilize your independent clinical judgment when addressing the question(s) below.    The Medical Record contains the following:   Indicators  Supporting Clinical Findings Location in Medical Record   X Anemia documented Anemia OB Progress note 6/28@625am   X H&H Recent Labs   Lab 06/26/23  0028 06/27/23  0503   WBC 10.45 14.71*   HGB 12.0 10.4*   HCT 35.0* 31.6*    OB Progress note 6/28@625am    BP                    HR      Bleeding     X Procedure/Surgery Performed/EBL Primary LTCS (GETA)    QBL: 800 cc OB Progress note 6/28@625am    Transfusion(s)     X Acute/Chronic illness Primary LTCS (GETA)  cHTN with si PreE with SF  Gestational Diabetes  Obesity OB Progress note 6/28@625am   X Treatments iron/colace OB Progress note 6/28@625am    Other       Provider, please further specify the diagnosis of anemia.   [ x  ] Acute blood loss anemia    [   ] Acute blood loss anemia expected post-operatively    [   ] Iron deficiency anemia    [   ] Other : _________________   [   ] Clinically Undetermined     Please document in your progress notes daily for the duration of treatment, until resolved, and include in your discharge summary.    Form No. 25258

## 2023-06-29 VITALS
WEIGHT: 290.81 LBS | SYSTOLIC BLOOD PRESSURE: 149 MMHG | TEMPERATURE: 98 F | DIASTOLIC BLOOD PRESSURE: 68 MMHG | HEART RATE: 93 BPM | BODY MASS INDEX: 45.64 KG/M2 | OXYGEN SATURATION: 99 % | RESPIRATION RATE: 18 BRPM | HEIGHT: 67 IN

## 2023-06-29 PROCEDURE — 25000003 PHARM REV CODE 250

## 2023-06-29 PROCEDURE — 99024 PR POST-OP FOLLOW-UP VISIT: ICD-10-PCS | Mod: ,,, | Performed by: OBSTETRICS & GYNECOLOGY

## 2023-06-29 PROCEDURE — 99024 POSTOP FOLLOW-UP VISIT: CPT | Mod: ,,, | Performed by: OBSTETRICS & GYNECOLOGY

## 2023-06-29 PROCEDURE — 25000003 PHARM REV CODE 250: Performed by: STUDENT IN AN ORGANIZED HEALTH CARE EDUCATION/TRAINING PROGRAM

## 2023-06-29 RX ORDER — HYDROCODONE BITARTRATE AND ACETAMINOPHEN 5; 325 MG/1; MG/1
1 TABLET ORAL EVERY 6 HOURS PRN
Qty: 10 TABLET | Refills: 0 | Status: SHIPPED | OUTPATIENT
Start: 2023-06-29 | End: 2023-08-07

## 2023-06-29 RX ORDER — IBUPROFEN 600 MG/1
600 TABLET ORAL EVERY 6 HOURS PRN
Qty: 32 TABLET | Refills: 0 | Status: SHIPPED | OUTPATIENT
Start: 2023-06-29 | End: 2023-08-07

## 2023-06-29 RX ORDER — NIFEDIPINE 30 MG/1
30 TABLET, EXTENDED RELEASE ORAL DAILY
Qty: 30 TABLET | Refills: 2 | Status: SHIPPED | OUTPATIENT
Start: 2023-06-29 | End: 2023-08-07

## 2023-06-29 RX ORDER — FERROUS SULFATE 324(65)MG
325 TABLET, DELAYED RELEASE (ENTERIC COATED) ORAL DAILY
Qty: 60 TABLET | Refills: 0 | Status: SHIPPED | OUTPATIENT
Start: 2023-06-29 | End: 2023-08-07

## 2023-06-29 RX ORDER — ACETAMINOPHEN AND CODEINE PHOSPHATE 120; 12 MG/5ML; MG/5ML
1 SOLUTION ORAL DAILY
Qty: 84 TABLET | Refills: 0 | Status: SHIPPED | OUTPATIENT
Start: 2023-06-29 | End: 2023-08-07

## 2023-06-29 RX ORDER — HYDROCORTISONE 1 %
CREAM (GRAM) TOPICAL 2 TIMES DAILY PRN
Qty: 28.35 G | Refills: 0 | Status: SHIPPED | OUTPATIENT
Start: 2023-06-29 | End: 2023-08-07

## 2023-06-29 RX ORDER — DOCUSATE SODIUM 100 MG/1
200 CAPSULE, LIQUID FILLED ORAL 2 TIMES DAILY
Qty: 60 CAPSULE | Refills: 0 | Status: SHIPPED | OUTPATIENT
Start: 2023-06-29 | End: 2023-08-07

## 2023-06-29 RX ADMIN — FERROUS SULFATE TAB 325 MG (65 MG ELEMENTAL FE) 1 EACH: 325 (65 FE) TAB at 08:06

## 2023-06-29 RX ADMIN — IBUPROFEN 600 MG: 600 TABLET ORAL at 11:06

## 2023-06-29 RX ADMIN — PRENATAL VIT W/ FE FUMARATE-FA TAB 27-0.8 MG 1 TABLET: 27-0.8 TAB at 08:06

## 2023-06-29 RX ADMIN — IBUPROFEN 600 MG: 600 TABLET ORAL at 05:06

## 2023-06-29 RX ADMIN — ACETAMINOPHEN 650 MG: 325 TABLET, FILM COATED ORAL at 05:06

## 2023-06-29 RX ADMIN — NIFEDIPINE 30 MG: 30 TABLET, FILM COATED, EXTENDED RELEASE ORAL at 08:06

## 2023-06-29 RX ADMIN — DOCUSATE SODIUM 200 MG: 100 CAPSULE, LIQUID FILLED ORAL at 08:06

## 2023-06-29 NOTE — NURSING
Pt notified RN of blister formed on LLQ of abdomen where adhesive tap from C/S dressing was previously located. Dr. Hernandez notified. Dr. Vincent at bedside to assess pt. MD instructed RN to cover blister with Vaseline and pad/gauze to prevent further friction. Neosporin ordered in case blister pops and infection becomes a concern. Will continue to monitor.

## 2023-06-29 NOTE — DISCHARGE SUMMARY
Delivery Discharge Summary  Obstetrics      Primary OB Clinician: Holley Hernandez MD      Admission date: 2023  Discharge date: 2023    Disposition: To home, self care    Discharge Diagnosis List:      Patient Active Problem List   Diagnosis    Foot dermatitis    Obesity affecting pregnancy in second trimester    Chronic hypertension affecting pregnancy    History of pre-eclampsia    Gestational diabetes mellitus (GDM) affecting pregnancy    Chronic hypertension with superimposed pre-eclampsia    Obesity    33 weeks gestation of pregnancy    Delivery by emergency  section       Procedure: , due to umbilical cord prolapse    Hospital Course:  Marcie Cheek is a 33 y.o. now , POD #3 who was admitted on 2023 at 35w0d for induction of labor. Patient was subsequently admitted to labor and delivery unit with signed consents.     Labor course was complicated by umbilical cord prolapse, and decision was made to proceed with delivery via . General anesthesia was performed due to emergent nature of delivery and the  section was performed without complications.     Please see delivery note for further details. The patient started having severe range blood pressures and was acutely treated with labetalol and was started on IV magnesium sulfate for seizure prophylaxis. She was started on Procardia 30 XL for BP control. Her postpartum course was uncomplicated. She was started on lovenox for VTE prophylaxis. On discharge day, patient's pain is controlled with oral pain medications. Pt is tolerating ambulation without SOB or CP, and regular diet without N/V. Reports lochia is mild. Denies any HA, vision changes, F/C, LE swelling. Denies any breast pain/soreness.    Pt in stable condition and ready for discharge. She has been instructed to start and/or continue medications and follow up with her obstetrics provider as listed below.    Pertinent studies:  CBC  Recent  Labs   Lab 23  0028 23  0503   WBC 10.45 14.71*   HGB 12.0 10.4*   HCT 35.0* 31.6*   MCV 90 91    233          Immunization History   Administered Date(s) Administered    Tdap 2022, 05/10/2023        Delivery:    Episiotomy: None   Lacerations: None   Repair suture:     Repair # of packets:     Blood loss (ml):       Birth information:  YOB: 2024   Time of birth: 5:59 PM   Sex: female   Delivery type: , Low Transverse   Gestational Age: 35w0d    Delivery Clinician:      Other providers:       Additional  information:  Forceps:    Vacuum:    Breech:    Observed anomalies      Living?:           APGARS  One minute Five minutes Ten minutes   Skin color:         Heart rate:         Grimace:         Muscle tone:         Breathing:         Totals: 5  9        Placenta: Delivered:       appearance    Patient Instructions:   Current Discharge Medication List        START taking these medications    Details   docusate sodium (COLACE) 100 MG capsule Take 2 capsules (200 mg total) by mouth 2 (two) times daily.  Qty: 60 capsule, Refills: 0      ferrous sulfate 325 (65 FE) MG EC tablet Take 1 tablet (325 mg total) by mouth once daily.  Qty: 60 tablet, Refills: 0      HYDROcodone-acetaminophen (NORCO) 5-325 mg per tablet Take 1 tablet by mouth every 6 (six) hours as needed for Pain.  Qty: 10 tablet, Refills: 0    Comments: Quantity prescribed more than 7 day supply? No      hydrocortisone 1 % cream Apply topically 2 (two) times daily as needed (apply to blisters).  Qty: 35 g, Refills: 0      ibuprofen (ADVIL,MOTRIN) 600 MG tablet Take 1 tablet (600 mg total) by mouth every 6 (six) hours as needed for Pain.  Qty: 32 tablet, Refills: 0      neomycin-bacitracin-polymyxin (NEOSPORIN) ointment Apply topically 3 (three) times daily as needed (blisters).  Qty: 28 g, Refills: 0      NIFEdipine (PROCARDIA-XL) 30 MG (OSM) 24 hr tablet Take 1 tablet (30 mg total) by mouth once daily.  Qty: 30  "tablet, Refills: 2    Comments: .      norethindrone (MICRONOR) 0.35 mg tablet Take 1 tablet (0.35 mg total) by mouth once daily.  Qty: 90 tablet, Refills: 0           CONTINUE these medications which have NOT CHANGED    Details   PNV no.1/iron,carb/docus/folic (PREN VIT COMB.1-IRON CB-FA-DSS ORAL) Take by mouth.           STOP taking these medications       insulin (LANTUS SOLOSTAR U-100 INSULIN) glargine 100 units/mL SubQ pen Comments:   Reason for Stopping:         pen needle, diabetic 31 gauge x 5/16" Ndle Comments:   Reason for Stopping:               Discharge Procedure Orders   Diet Adult Regular     Lifting restrictions   Order Comments: Lifting no more than infant's weight for 6 weeks.     No driving until:   Order Comments: No driving until discontinued narcotics and no pain with stepping on brakes.     Pelvic Rest   Order Comments: Nothing in the vagina including intercourse for 6 weeks.     Notify your health care provider if you experience any of the following:  temperature >100.4     Notify your health care provider if you experience any of the following:  persistent nausea and vomiting or diarrhea     Notify your health care provider if you experience any of the following:  severe uncontrolled pain     Notify your health care provider if you experience any of the following:  redness, tenderness, or signs of infection (pain, swelling, redness, odor or green/yellow discharge around incision site)     Notify your health care provider if you experience any of the following:  severe persistent headache     Notify your health care provider if you experience any of the following:  persistent dizziness, light-headedness, or visual disturbances     Notify your health care provider if you experience any of the following:  increased confusion or weakness     Notify your health care provider if you experience any of the following:   Order Comments: Heavy vaginal bleeding saturating more than 2 pads in 1 hour. "     Activity as tolerated        Follow-up Information       Holley Hernandez MD. Schedule an appointment as soon as possible for a visit in 6 week(s).    Specialty: Obstetrics and Gynecology  Why: For post partum visit  Contact information:  [address] [id number] [phone]               Holley Hernandez MD. Schedule an appointment as soon as possible for a visit in 1 week(s).    Specialty: Obstetrics and Gynecology  Why: For BP check  Contact information:  [address] [id number] [phone]                              Mendez Torres MD PGY-2  Obstetrics and Gynecology

## 2023-06-29 NOTE — PLAN OF CARE
VSS. Ambulating and voiding without difficulty. Pain well controlled with PO pain meds. C/s incision intact with no signs of infection. Fundus midline, firm, with light lochia. Significant other at bedside and assisting in patient's care. Baby in NICU; Pt going down to see baby intermittently. Patient safety maintained, side rails up, bed low and locked position.  Will continue to round as needed.

## 2023-06-29 NOTE — LACTATION NOTE
06/29/23 1100   Maternal Assessment   Breast Shape Bilateral:;pendulous   Breast Density Bilateral:;soft   Maternal Infant Feeding   Maternal Emotional State independent;relaxed   Equipment Type   Breast Pump Type double electric, hospital grade   Breast Pump Flange Type hard   Breast Pumping   Breast Pumping Interventions frequent pumping encouraged   Breast Pumping double electric breast pump utilized  (encouraged hands on pumping)   Community Referrals   Community Referrals outpatient lactation program;pediatric care provider;support group     LC to room: client and FOB in room. Client stated she pumped 5 times in the past 24 hours, produced >20ml with last 2 pumping sessions. LC encouraged client to pump 8-10 times in 24 hours to promote milk supply, edu provided on supply and demand and how to switch settings to maintain if next pumping session produces >20ml.     Discharge education provided utilizing NICU Breastfeeding handout. Pumping schedule frequency and duration reviewed, amount expected on current day, cleaning and sterilizing pump parts, milk handling, labeling, storage, and transport reviewed. Pump rental information reviewed, client stated that she did not have an interest in renting. LC reminded client to pack all pump parts for when she comes and pumps in the NICU, client verbalized understanding and stated that she had several pumps at home for use. Pump Rx in computer for if client decides to  a new pump with this pregnancy from [employer]. Edu provided on benefits of using a new pump for each pregnancy. All questions answered, client and FOB verbalized understanding, extension on whiteboard.  provided lanolin and insulated transport bag for client prior to discharge.  Discharge education complete

## 2023-06-29 NOTE — PLAN OF CARE
06/29/23 1223   Final Note   Assessment Type Final Discharge Note   Anticipated Discharge Disposition Home   Post-Acute Status   Discharge Delays None known at this time

## 2023-06-29 NOTE — PROGRESS NOTES
POSTPARTUM PROGRESS NOTE    Subjective:     PPD/POD#: 3   Procedure: Primary LTCS (GETA)   EGA: 35w1d   N/V: No   F/C: No   Abd Pain: Mild, well-controlled with oral pain medication   Lochia: Mild   Voiding: Yes   Ambulating: Yes   Bowel fnc: Yes   Contraception: Progesterone only pills     Objective:      Temp:  [97.9 °F (36.6 °C)-98.5 °F (36.9 °C)] 98.2 °F (36.8 °C)  Pulse:  [] 89  Resp:  [18-20] 18  SpO2:  [96 %-100 %] 96 %  BP: (124-148)/(58-77) 129/73    Abdomen: Soft, appropriately tender. Blister noted 5 cm away from incision.   Uterus: Firm, no fundal tenderness   Incision: Clean, dry, and intact.  No erythema, induration, or drainage.     Lab Review    Recent Labs   Lab 23  0028      K 4.0      CO2 18*   BUN 9   CREATININE 0.7   *   PROT 7.1   BILITOT 0.3   ALKPHOS 121   ALT 15   AST 17         Recent Labs   Lab 23  0028 23  0503   WBC 10.45 14.71*   HGB 12.0 10.4*   HCT 35.0* 31.6*   MCV 90 91    233           I/O  No intake or output data in the 24 hours ending 23 0656       Assessment and Plan:   Postpartum care:  - Patient doing well.  - Continue routine management and advances.  - Blister likely from adhesive. Continue vaseline and neosporin regimen.    cHTN with si PreE with SF  - BP as above  - asymptomatic  - preE labs as above  - Mag: completed  - Hypertensive agent : Procardia 30 XL ()    Gestational Diabetes  - Fasting B  - Will need 2 hour gtt at postpartum visit    Obesity  - PrePreg BMI 42  - Encourage ambulation, SCDs in bed  - Lovenox: 40 mg BID    Anemia   - H/H as above  - QBL: 800 cc  - asymptomatic  - iron/colace    Rubella nonimmune  - Will need MMR postpartum    Mendez Torres MD PGY-2  Obstetrics and Gynecology

## 2023-06-30 ENCOUNTER — TELEPHONE (OUTPATIENT)
Dept: OBSTETRICS AND GYNECOLOGY | Facility: CLINIC | Age: 33
End: 2023-06-30
Payer: COMMERCIAL

## 2023-06-30 NOTE — TELEPHONE ENCOUNTER
----- Message from Wilmar Paz sent at 6/30/2023  3:23 PM CDT -----  Patient called in returning a phone call she received about a blood pressure check. Please give pt a call back.

## 2023-06-30 NOTE — TELEPHONE ENCOUNTER
Spoke with pt and informed her that her readings were high. Recommended that she goes over (in nicu) and have JENNI check her pressure.

## 2023-07-03 ENCOUNTER — PATIENT MESSAGE (OUTPATIENT)
Dept: OBSTETRICS AND GYNECOLOGY | Facility: CLINIC | Age: 33
End: 2023-07-03
Payer: COMMERCIAL

## 2023-07-05 ENCOUNTER — PATIENT MESSAGE (OUTPATIENT)
Dept: OBSTETRICS AND GYNECOLOGY | Facility: CLINIC | Age: 33
End: 2023-07-05
Payer: COMMERCIAL

## 2023-07-12 ENCOUNTER — POSTPARTUM VISIT (OUTPATIENT)
Dept: OBSTETRICS AND GYNECOLOGY | Facility: CLINIC | Age: 33
End: 2023-07-12
Payer: COMMERCIAL

## 2023-07-12 VITALS
DIASTOLIC BLOOD PRESSURE: 85 MMHG | WEIGHT: 271.19 LBS | SYSTOLIC BLOOD PRESSURE: 113 MMHG | BODY MASS INDEX: 42.56 KG/M2 | HEIGHT: 67 IN

## 2023-07-12 DIAGNOSIS — O24.419 GESTATIONAL DIABETES MELLITUS (GDM) AFFECTING PREGNANCY: ICD-10-CM

## 2023-07-12 DIAGNOSIS — O11.9 CHRONIC HYPERTENSION WITH SUPERIMPOSED PRE-ECLAMPSIA: ICD-10-CM

## 2023-07-12 PROCEDURE — 0502F SUBSEQUENT PRENATAL CARE: CPT | Mod: CPTII,S$GLB,, | Performed by: STUDENT IN AN ORGANIZED HEALTH CARE EDUCATION/TRAINING PROGRAM

## 2023-07-12 PROCEDURE — 0502F PR SUBSEQUENT PRENATAL CARE: ICD-10-PCS | Mod: CPTII,S$GLB,, | Performed by: STUDENT IN AN ORGANIZED HEALTH CARE EDUCATION/TRAINING PROGRAM

## 2023-07-12 NOTE — PROGRESS NOTES
Ike presents for mood and BP check   Doing well! No pain meds needed. No bleeding. Normal urination and bowels.   Not taking Procardia, BP normal  Taking micronor, doing well  Breastfeeding and pumping going well  Baby is home from NICU and doing well  Mood is good  Incision c/d/I  Pt will do home glucose monitoring instead of 2 hour GTT and send in results    RTC for routine PP visit

## 2023-07-14 NOTE — ADDENDUM NOTE
Addendum  created 07/14/23 1708 by Kosta Chun, DO    Intraprocedure Event deleted, Intraprocedure Staff edited

## 2023-07-22 ENCOUNTER — PATIENT MESSAGE (OUTPATIENT)
Dept: OBSTETRICS AND GYNECOLOGY | Facility: CLINIC | Age: 33
End: 2023-07-22
Payer: COMMERCIAL

## 2023-08-07 ENCOUNTER — POSTPARTUM VISIT (OUTPATIENT)
Dept: OBSTETRICS AND GYNECOLOGY | Facility: CLINIC | Age: 33
End: 2023-08-07
Payer: COMMERCIAL

## 2023-08-07 ENCOUNTER — PATIENT MESSAGE (OUTPATIENT)
Dept: OBSTETRICS AND GYNECOLOGY | Facility: CLINIC | Age: 33
End: 2023-08-07

## 2023-08-07 VITALS
WEIGHT: 275.56 LBS | SYSTOLIC BLOOD PRESSURE: 118 MMHG | BODY MASS INDEX: 43.25 KG/M2 | DIASTOLIC BLOOD PRESSURE: 91 MMHG | HEIGHT: 67 IN

## 2023-08-07 DIAGNOSIS — O24.419 GESTATIONAL DIABETES MELLITUS (GDM) AFFECTING PREGNANCY: ICD-10-CM

## 2023-08-07 PROCEDURE — 0503F POSTPARTUM CARE VISIT: CPT | Mod: CPTII,S$GLB,, | Performed by: STUDENT IN AN ORGANIZED HEALTH CARE EDUCATION/TRAINING PROGRAM

## 2023-08-07 PROCEDURE — 0503F PR POSTPARTUM CARE VISIT: ICD-10-PCS | Mod: CPTII,S$GLB,, | Performed by: STUDENT IN AN ORGANIZED HEALTH CARE EDUCATION/TRAINING PROGRAM

## 2023-08-07 RX ORDER — ACETAMINOPHEN AND CODEINE PHOSPHATE 120; 12 MG/5ML; MG/5ML
1 SOLUTION ORAL DAILY
Qty: 90 TABLET | Refills: 0 | Status: SHIPPED | OUTPATIENT
Start: 2023-08-07 | End: 2023-11-05

## 2023-08-07 NOTE — LETTER
August 7, 2023    Marcie Cheek  [address]  [place] [address]         [facility]  [address]  [facility] [address]  Phone: [phone]  Fax: [phone] August 7, 2023     Patient: Marcie Cheek       Date of Visit: 8/7/2023       To Whom It May Concern:    It is my medical opinion that Marcie Cheek may return to work on 08/08/2023 .    If you have any questions or concerns, please don't hesitate to call.    Sincerely,        Holley Hernandez MD

## 2023-08-07 NOTE — PROGRESS NOTES
History & Physical  Gynecology      SUBJECTIVE:     Chief Complaint: Postpartum Care       History of Present Illness:    Marcie Cheek is here for her postaprtum visit after emergent 1LTCS delivery on . Delivery was complicated by cord prolapse.  Pregnancy was complicated by obesity, GDM on insulin, cHTN, anxiety. She is feeling well today. She denies bleeding, pain, F/C, N/V. Normal bowel and bladder function. Pumping/Breastfeeding is going well. Has  had sex since the delivery without issue. Has not yet started contraception for fear of breast milk supply. She denies si/sx of PPD.  Baby is doing well. Contraceptive plans OCP then vasectomy. Pap utd      Review of patient's allergies indicates:  No Known Allergies    Past Medical History:   Diagnosis Date    Anxiety     Depression     Diabetes mellitus affecting pregnancy in third trimester 3/14/2022    Hypertension      Past Surgical History:   Procedure Laterality Date     SECTION N/A 2023    Procedure:  SECTION;  Surgeon: Holley Hernandez MD;  Location: [facility], [place], operated by [facility] L&D;  Service: OB/GYN;  Laterality: N/A;    HYSTEROSCOPY      WISDOM TOOTH EXTRACTION       OB History          2    Para   2    Term           2    AB        Living   2         SAB        IAB        Ectopic        Multiple   0    Live Births   2               Family History   Problem Relation Age of Onset    Thyroid disease Maternal Grandmother     Breast cancer Neg Hx     Colon cancer Neg Hx     Ovarian cancer Neg Hx      Social History     Tobacco Use    Smoking status: Never    Smokeless tobacco: Never   Substance Use Topics    Alcohol use: Not Currently    Drug use: Never       No current outpatient medications on file.     No current facility-administered medications for this visit.         Review of Systems:  Review of Systems   Constitutional:  Negative for activity change, appetite change, chills and fever.   Respiratory:  Negative for shortness of  breath.    Cardiovascular:  Negative for chest pain.   Gastrointestinal:  Negative for abdominal pain, blood in stool, constipation, diarrhea, nausea and vomiting.   Endocrine: Negative for diabetes.   Genitourinary:  Negative for dysuria, hematuria, pelvic pain, vaginal bleeding, vaginal discharge and vaginal pain.   Integumentary:  Negative for breast mass.   Neurological:  Negative for headaches.   Psychiatric/Behavioral:  Negative for depression. The patient is not nervous/anxious.    Breast: Negative for mass and mastodynia       OBJECTIVE:     Physical Exam:  Physical Exam  Vitals reviewed.   Constitutional:       General: She is not in acute distress.     Appearance: She is well-developed. She is not diaphoretic.   HENT:      Head: Normocephalic and atraumatic.   Eyes:      Conjunctiva/sclera: Conjunctivae normal.   Cardiovascular:      Rate and Rhythm: Normal rate.   Pulmonary:      Effort: Pulmonary effort is normal.   Abdominal:      Comments: Incision c/d/I  No fluctuance/induration/bleeding/purulence/erythema/tenderness.       Musculoskeletal:         General: Normal range of motion.      Cervical back: Normal range of motion.   Skin:     General: Skin is warm and dry.   Neurological:      Mental Status: She is alert and oriented to person, place, and time.           ASSESSMENT:       ICD-10-CM ICD-9-CM    1. Delivery by emergency  section  O99.892 669.70       2. Gestational diabetes mellitus (GDM) affecting pregnancy  O24.419 648.83              Plan:      Assessment and Plan:  Pt is doing well postpartum. No complaints.  Incision healing well  PPD screen negative  T2DM screen not indicated. POCT home testing is normal.  Pap utd  Contraceptive plans: Patient was counseled today on contraceptivel options--Pills, Patch, Depo-Provera, IUD, Nuva Ring, Nexplanon and Mirena/Sklya/Paragard and the pros and cons of each and advatantages of condoms to prevent STDs.  The patient elected to use the  POPthen cOCP if not vasectomy. Proper use discussed as well as information on the effects of this medication on breastfeeding and on the postpartum patient. We also discussed keeping the package insert for quick access to instructions on how to properly make up for missed doses.   Pt is cleared for all activity    RTC for annual or PRN   Counseling time: 30 minutes    Holley Hernandez

## 2023-08-07 NOTE — PATIENT INSTRUCTIONS
Local support: [employer] and DNP [employer] [email or web address] [phone] (ext [phone])    Local [place] Support:  Snuggles and struggles:  Tuesdays 10:30a T  [email or web address]  Mom to Mom:    Wednesdays 10:30a    [email or web address]  Beyond the Blues:   Mondays 2:00p     SocietyOne  Cafe Au Lait:    Times vary     [email or web address]  Baby [place]:   Mondays & Fridays 9a-12p [email or web address]  Baby Cafe:    Wednesdays 12:00p    [email or web address]    Get education and online support at [email or web address]  Talk @ [phone]   Text @ [phone]

## 2023-08-20 ENCOUNTER — PATIENT MESSAGE (OUTPATIENT)
Dept: OBSTETRICS AND GYNECOLOGY | Facility: CLINIC | Age: 33
End: 2023-08-20
Payer: COMMERCIAL

## 2023-08-20 ENCOUNTER — NURSE TRIAGE (OUTPATIENT)
Dept: ADMINISTRATIVE | Facility: CLINIC | Age: 33
End: 2023-08-20
Payer: COMMERCIAL

## 2023-08-20 NOTE — TELEPHONE ENCOUNTER
Patient states she is 8 weeks postpartum and c/o new scant bleeding at  section incision. Patient states she is wiping small amounts of blood at her incision site and notices a pin hole size opening. Patient also states that after sexual activity on last week she experienced vaginal bleeding and an episode of diarrhea and cramping. Patient states she is asymptomatic at this time.     Care Advice per Postpartum -  Incision Symptoms (Adult) Guideline provided. Patient advised to See OB/Gyn within 24 Hours. Patient also advised to place a clean guaze over the incision to monitor for increased bleeding and to call back for any worsening symptoms. Patient states understanding of care advice and cites no additional concerns at this time.     Reason for Disposition   [1] Clear or blood-tinged fluid draining from wound AND [2] no fever    Additional Information   Negative: [1] Wound gaping open AND [2] visible internal organs   Negative: Sounds like a life-threatening emergency to the triager   Negative: [1] Bleeding from incision AND [2] won't stop after 10 minutes of direct pressure (using correct technique)   Negative: [1] Widespread rash AND [2] bright red, sunburn-like   Negative: Severe pain in the incision   Negative: [1] Incision gaping open AND [2] < 48 hours since wound re-opened   Negative: [1] Incision gaping open AND [2] length of opening > 2 inches (5 cm)   Negative: Patient sounds very sick or weak to the triager   Negative: Incision looks infected (spreading redness, pain)   Negative: [1] Incision looks infected (spreading redness, pain) AND [2] large red area (> 2 in. or 5 cm)   Negative: Pus or bad-smelling fluid draining from incision   Negative: Fever 100.4 F (38.0 C) or higher   Negative: Dressing soaked with blood or body fluid (e.g., drainage)   Negative: [1] Caller has URGENT question AND [2] triager unable to answer question   Negative: Suture or staple removal is overdue    Negative: [1] Small red area or streak AND [2] no fever    Protocols used: Postpartum -  Incision Symptoms-A-AH

## 2024-04-11 NOTE — PHYSICIAN QUERY
PT Name: Marcie Cheek  MR #: [id number]     DOCUMENTATION CLARIFICATION     CDS/: KRISTEL Roberto,RNC-MNN     Contact information:[email or web address]  This form is a permanent document in the medical record.    Query Date: May 9, 2022  By submitting this query, we are merely seeking further clarification of documentation. Please utilize your independent clinical judgment when addressing the question(s) below.      Indicators Supporting Clinical Findings Location in Medical Record   X Documentation of uterine atony with bleeding, post-partum bleeding, or hemorrhage noting uterine atony with bleeding L&D Delivery note 5/9    Documentation of retained placenta     X Delivery type with EBL or QBL Normal spontaneous vaginal delivery of live infant    Calculated QBL (mL): 300 L&D Delivery note 5/9   X H/H Hgb=10.4-->11.6-->10.6  Hct=31.5-->35.1-->32.4 LAB 5/7-5/9    Vital signs     X Medications, Treatment IV pitocin given noting uterine atony with bleeding. Cytotec 800mcg AK administered with improvement in uterine tone and bleeding L&D Delivery note 5/9    Blood transfusion      Other          Provider, please specify the diagnosis or diagnoses associated with the above clinical findings:      [   ] Immediate post-partum hemorrhage   [  x ] Postpartum uterine atony without hemorrhage   [   ] Other hematological diagnosis (please specify): _________________   [  ] Clinically undetermined        Please document in your progress notes daily for the duration of treatment, until resolved, and include in your discharge summary.  (Form 55663)       no

## (undated) DEVICE — PAD ABDOMINAL 8X7.5 STERILE